# Patient Record
Sex: FEMALE | Race: WHITE | URBAN - METROPOLITAN AREA
[De-identification: names, ages, dates, MRNs, and addresses within clinical notes are randomized per-mention and may not be internally consistent; named-entity substitution may affect disease eponyms.]

---

## 2023-03-10 DIAGNOSIS — I10 PRIMARY HYPERTENSION: Primary | ICD-10-CM

## 2023-03-10 RX ORDER — HYDROCHLOROTHIAZIDE 25 MG/1
1 TABLET ORAL DAILY
COMMUNITY
Start: 2013-04-25 | End: 2023-09-07 | Stop reason: SDUPTHER

## 2023-03-10 RX ORDER — AMLODIPINE BESYLATE 10 MG/1
10 TABLET ORAL DAILY
Qty: 90 TABLET | Refills: 3 | Status: SHIPPED | OUTPATIENT
Start: 2023-03-10 | End: 2024-03-18 | Stop reason: SDUPTHER

## 2023-03-10 RX ORDER — DIAZEPAM 2 MG/1
2 TABLET ORAL EVERY 8 HOURS PRN
COMMUNITY
End: 2023-04-27 | Stop reason: SDUPTHER

## 2023-03-10 RX ORDER — ERGOCALCIFEROL 1.25 MG/1
1.25 CAPSULE ORAL
COMMUNITY
Start: 2023-01-27

## 2023-03-10 RX ORDER — AMLODIPINE BESYLATE 10 MG/1
10 TABLET ORAL DAILY
COMMUNITY
End: 2023-03-10 | Stop reason: SDUPTHER

## 2023-04-26 PROBLEM — J32.9 SINUSITIS: Status: RESOLVED | Noted: 2023-04-26 | Resolved: 2023-04-26

## 2023-04-26 PROBLEM — I10 HYPERTENSION: Status: ACTIVE | Noted: 2023-04-26

## 2023-04-26 PROBLEM — H65.90 SEROUS OTITIS MEDIA: Status: RESOLVED | Noted: 2023-04-26 | Resolved: 2023-04-26

## 2023-04-26 PROBLEM — E66.9 OBESITY: Status: ACTIVE | Noted: 2023-04-26

## 2023-04-26 PROBLEM — I51.7 MILD LEFT VENTRICULAR HYPERTROPHY: Status: ACTIVE | Noted: 2023-04-26

## 2023-04-26 PROBLEM — E55.9 VITAMIN D DEFICIENCY: Status: ACTIVE | Noted: 2023-04-26

## 2023-04-26 PROBLEM — F41.9 ANXIETY: Status: ACTIVE | Noted: 2023-04-26

## 2023-04-26 PROBLEM — M17.11 PRIMARY OSTEOARTHRITIS OF RIGHT KNEE: Status: ACTIVE | Noted: 2023-04-26

## 2023-04-26 PROBLEM — R82.90 ABNORMAL FINDING IN URINE: Status: ACTIVE | Noted: 2023-04-26

## 2023-04-26 PROBLEM — R79.9 ABNORMAL BLOOD CHEMISTRY: Status: ACTIVE | Noted: 2023-04-26

## 2023-04-26 PROBLEM — D17.21 LIPOMA OF RIGHT UPPER EXTREMITY: Status: ACTIVE | Noted: 2023-04-26

## 2023-04-26 PROBLEM — I87.2 VENOUS INSUFFICIENCY: Status: ACTIVE | Noted: 2023-04-26

## 2023-04-26 PROBLEM — L30.9 DERMATITIS: Status: RESOLVED | Noted: 2023-04-26 | Resolved: 2023-04-26

## 2023-04-26 PROBLEM — E78.89 ELEVATED HDL: Status: ACTIVE | Noted: 2023-04-26

## 2023-04-26 PROBLEM — R39.9 UTI SYMPTOMS: Status: RESOLVED | Noted: 2023-04-26 | Resolved: 2023-04-26

## 2023-04-26 PROBLEM — E78.5 HYPERLIPIDEMIA: Status: ACTIVE | Noted: 2023-04-26

## 2023-04-26 PROBLEM — J98.01 BRONCHOSPASM: Status: ACTIVE | Noted: 2023-04-26

## 2023-04-26 PROBLEM — J20.9 ACUTE BRONCHITIS: Status: RESOLVED | Noted: 2023-04-26 | Resolved: 2023-04-26

## 2023-04-27 ENCOUNTER — OFFICE VISIT (OUTPATIENT)
Dept: PRIMARY CARE | Facility: CLINIC | Age: 73
End: 2023-04-27
Payer: MEDICARE

## 2023-04-27 VITALS — SYSTOLIC BLOOD PRESSURE: 130 MMHG | DIASTOLIC BLOOD PRESSURE: 84 MMHG | HEART RATE: 78 BPM | RESPIRATION RATE: 16 BRPM

## 2023-04-27 DIAGNOSIS — R35.0 URINARY FREQUENCY: ICD-10-CM

## 2023-04-27 DIAGNOSIS — F41.9 ANXIETY: Primary | ICD-10-CM

## 2023-04-27 PROCEDURE — 99214 OFFICE O/P EST MOD 30 MIN: CPT | Performed by: INTERNAL MEDICINE

## 2023-04-27 PROCEDURE — 1160F RVW MEDS BY RX/DR IN RCRD: CPT | Performed by: INTERNAL MEDICINE

## 2023-04-27 PROCEDURE — 1036F TOBACCO NON-USER: CPT | Performed by: INTERNAL MEDICINE

## 2023-04-27 PROCEDURE — 87086 URINE CULTURE/COLONY COUNT: CPT

## 2023-04-27 PROCEDURE — 1159F MED LIST DOCD IN RCRD: CPT | Performed by: INTERNAL MEDICINE

## 2023-04-27 PROCEDURE — 3079F DIAST BP 80-89 MM HG: CPT | Performed by: INTERNAL MEDICINE

## 2023-04-27 PROCEDURE — 81001 URINALYSIS AUTO W/SCOPE: CPT

## 2023-04-27 PROCEDURE — 3075F SYST BP GE 130 - 139MM HG: CPT | Performed by: INTERNAL MEDICINE

## 2023-04-27 RX ORDER — DIAZEPAM 2 MG/1
2 TABLET ORAL EVERY 8 HOURS PRN
Qty: 90 TABLET | Refills: 1 | Status: SHIPPED | OUTPATIENT
Start: 2023-04-27 | End: 2023-10-10 | Stop reason: SDUPTHER

## 2023-04-27 NOTE — PATIENT INSTRUCTIONS
Vitamin D levels improved, may use over-the-counter 2000 international units daily to maintain  Cholesterol levels are improved with healthy level of HDL cholesterol  Blood pressure satisfactory  Anxiety therapy satisfactory, continue  Pelvic exercises and check urinalysis

## 2023-04-27 NOTE — PROGRESS NOTES
Subjective   Patient ID: Mitra Jeffries is a 73 y.o. female who presents for Blood Pressure Check.    HPI   Refill meds  No cardiovascular pulmonary or neurologic symptoms  Anxiety well controlled on current therapy  Knee replacement - grandson in Aug   Son divorce   Vitamin D   Frequency urination, requests urinalysis    Review of Systems   Constitutional: Negative.  Negative for fatigue and unexpected weight change.   Respiratory: Negative.     Cardiovascular: Negative.    Genitourinary:  Positive for frequency.   Musculoskeletal:  Positive for arthralgias.   Psychiatric/Behavioral:  Negative for agitation and dysphoric mood. The patient is nervous/anxious.        Objective   /84   Pulse 78   Resp 16     Physical Exam  Constitutional:       General: She is not in acute distress.  HENT:      Mouth/Throat:      Pharynx: Oropharynx is clear.   Eyes:      Conjunctiva/sclera: Conjunctivae normal.   Neck:      Vascular: No carotid bruit.   Cardiovascular:      Rate and Rhythm: Normal rate and regular rhythm.      Heart sounds: Normal heart sounds. No murmur heard.  Pulmonary:      Effort: Pulmonary effort is normal.      Breath sounds: Normal breath sounds. No wheezing, rhonchi or rales.   Musculoskeletal:      Right lower leg: No edema.      Left lower leg: No edema.   Neurological:      Mental Status: She is alert. Mental status is at baseline.   Psychiatric:         Mood and Affect: Mood normal.         Behavior: Behavior normal.         Thought Content: Thought content normal.     Data  Vitamin D equals 39 November 2022, increased from 14  CBC, renal panel, thyroid function satisfactory  Urine tox screen appropriate  Assessment/Plan     1 hypertension-doing well  2 chronic anxiety disorder-doing well on chronic therapy, benzodiazepine therapy has been safe effective and well-tolerated  3 degenerative joint disease knees-stable  4 vitamin D deficiency corrected  5 urinary frequency-reviewed differential  diagnosis, agree with urinalysis

## 2023-04-28 LAB
APPEARANCE, URINE: ABNORMAL
BACTERIA, URINE: ABNORMAL /HPF
BILIRUBIN, URINE: NEGATIVE
BLOOD, URINE: NEGATIVE
CALCIUM OXALATE CRYSTALS, URINE: ABNORMAL /HPF
COLOR, URINE: YELLOW
GLUCOSE, URINE: NEGATIVE MG/DL
KETONES, URINE: ABNORMAL MG/DL
LEUKOCYTE ESTERASE, URINE: ABNORMAL
MUCUS, URINE: ABNORMAL /LPF
NITRITE, URINE: NEGATIVE
PH, URINE: 5 (ref 5–8)
PROTEIN, URINE: NEGATIVE MG/DL
RBC, URINE: 2 /HPF (ref 0–5)
SPECIFIC GRAVITY, URINE: 1.02 (ref 1–1.03)
SQUAMOUS EPITHELIAL CELLS, URINE: 6 /HPF
URINE CULTURE: NORMAL
UROBILINOGEN, URINE: <2 MG/DL (ref 0–1.9)
WBC, URINE: 19 /HPF (ref 0–5)

## 2023-07-08 ASSESSMENT — ENCOUNTER SYMPTOMS
CARDIOVASCULAR NEGATIVE: 1
NERVOUS/ANXIOUS: 1
RESPIRATORY NEGATIVE: 1
FATIGUE: 0
FREQUENCY: 1
ARTHRALGIAS: 1
UNEXPECTED WEIGHT CHANGE: 0
DYSPHORIC MOOD: 0
AGITATION: 0
CONSTITUTIONAL NEGATIVE: 1

## 2023-09-07 DIAGNOSIS — I10 HYPERTENSION, UNSPECIFIED TYPE: ICD-10-CM

## 2023-09-07 RX ORDER — HYDROCHLOROTHIAZIDE 25 MG/1
25 TABLET ORAL DAILY
Qty: 90 TABLET | Refills: 3 | Status: SHIPPED | OUTPATIENT
Start: 2023-09-07

## 2023-10-05 ENCOUNTER — APPOINTMENT (OUTPATIENT)
Dept: PRIMARY CARE | Facility: CLINIC | Age: 73
End: 2023-10-05
Payer: MEDICARE

## 2023-10-10 ENCOUNTER — OFFICE VISIT (OUTPATIENT)
Dept: PRIMARY CARE | Facility: CLINIC | Age: 73
End: 2023-10-10
Payer: MEDICARE

## 2023-10-10 VITALS — HEART RATE: 76 BPM | DIASTOLIC BLOOD PRESSURE: 84 MMHG | SYSTOLIC BLOOD PRESSURE: 136 MMHG

## 2023-10-10 DIAGNOSIS — Z51.81 THERAPEUTIC DRUG MONITORING: ICD-10-CM

## 2023-10-10 DIAGNOSIS — F41.9 ANXIETY: Primary | ICD-10-CM

## 2023-10-10 DIAGNOSIS — M17.11 PRIMARY OSTEOARTHRITIS OF RIGHT KNEE: ICD-10-CM

## 2023-10-10 DIAGNOSIS — I10 PRIMARY HYPERTENSION: ICD-10-CM

## 2023-10-10 PROCEDURE — 1160F RVW MEDS BY RX/DR IN RCRD: CPT | Performed by: INTERNAL MEDICINE

## 2023-10-10 PROCEDURE — 1036F TOBACCO NON-USER: CPT | Performed by: INTERNAL MEDICINE

## 2023-10-10 PROCEDURE — 3079F DIAST BP 80-89 MM HG: CPT | Performed by: INTERNAL MEDICINE

## 2023-10-10 PROCEDURE — 1159F MED LIST DOCD IN RCRD: CPT | Performed by: INTERNAL MEDICINE

## 2023-10-10 PROCEDURE — 3075F SYST BP GE 130 - 139MM HG: CPT | Performed by: INTERNAL MEDICINE

## 2023-10-10 PROCEDURE — 99214 OFFICE O/P EST MOD 30 MIN: CPT | Performed by: INTERNAL MEDICINE

## 2023-10-10 PROCEDURE — 80346 BENZODIAZEPINES1-12: CPT

## 2023-10-10 PROCEDURE — 80307 DRUG TEST PRSMV CHEM ANLYZR: CPT

## 2023-10-10 RX ORDER — DIAZEPAM 2 MG/1
2 TABLET ORAL EVERY 8 HOURS PRN
Qty: 90 TABLET | Refills: 1 | Status: SHIPPED | OUTPATIENT
Start: 2023-10-10 | End: 2024-03-18 | Stop reason: SDUPTHER

## 2023-10-10 NOTE — PROGRESS NOTES
Subjective   Patient ID: Mitra Jeffries is a 73 y.o. female who presents for Follow-up.  HPI  Check blood pressure  No cardiovascular or pulmonary or neurologic symptoms  Medication renewal, anxiety therapy meeting her needs, well-tolerated  Stressful events Son divorce/ marriage;  dental emergency  knee gel inj plan    Review of Systems   Constitutional: Negative.    Respiratory:  Negative for cough, chest tightness and shortness of breath.    Cardiovascular: Negative.  Negative for chest pain, palpitations and leg swelling.   Musculoskeletal:  Positive for arthralgias.   Psychiatric/Behavioral:  Negative for dysphoric mood. The patient is nervous/anxious.        Objective   Physical Exam  Constitutional:       General: She is not in acute distress.     Appearance: She is obese.   Cardiovascular:      Rate and Rhythm: Normal rate and regular rhythm.      Heart sounds: Normal heart sounds. No murmur heard.  Pulmonary:      Effort: Pulmonary effort is normal.      Breath sounds: Normal breath sounds.   Musculoskeletal:      Comments: Right knee surgical scar, left knee crepitus   Neurological:      Mental Status: She is alert.      Gait: Gait normal.   Psychiatric:         Mood and Affect: Mood normal.       /84   Pulse 76       Assessment/Plan     Hypertension-satisfactory on current therapy  Chronic anxiety-satisfactory chronic use, safe and effective well-tolerated  Acknowledge stressful life events, attentive listening  Urine tox screen [facility] report and controlled substance patient agreement  Degenerative joint disease knees-managing  Declines influenza vaccine    Problem List Items Addressed This Visit       Anxiety - Primary    Relevant Medications    diazePAM (Valium) 2 mg tablet    Other Relevant Orders    Drug Screen, Urine With Reflex to Confirmation (Completed)    Benzodiazepine Confirmation, Urine (Completed)    OOB Internal Tracking (Completed)    Hypertension    Primary osteoarthritis of  right knee    Therapeutic drug monitoring    Relevant Orders    Drug Screen, Urine With Reflex to Confirmation (Completed)    Benzodiazepine Confirmation, Urine (Completed)    OOB Internal Tracking (Completed)

## 2023-10-11 LAB
AMPHETAMINES UR QL SCN: ABNORMAL
BARBITURATES UR QL SCN: ABNORMAL
BENZODIAZ UR QL SCN: ABNORMAL
BZE UR QL SCN: ABNORMAL
CANNABINOIDS UR QL SCN: ABNORMAL
FENTANYL+NORFENTANYL UR QL SCN: ABNORMAL
OPIATES UR QL SCN: ABNORMAL
OXYCODONE+OXYMORPHONE UR QL SCN: ABNORMAL
PCP UR QL SCN: ABNORMAL

## 2023-10-12 LAB
1OH-MIDAZOLAM UR CFM-MCNC: <25 NG/ML
7AMINOCLONAZEPAM UR CFM-MCNC: <25 NG/ML
A-OH ALPRAZ UR CFM-MCNC: <25 NG/ML
ALPRAZ UR CFM-MCNC: <25 NG/ML
CHLORDIAZEP UR CFM-MCNC: <25 NG/ML
CLONAZEPAM UR CFM-MCNC: <25 NG/ML
DIAZEPAM UR CFM-MCNC: <25 NG/ML
LORAZEPAM UR CFM-MCNC: <25 NG/ML
MIDAZOLAM UR CFM-MCNC: <25 NG/ML
NORDIAZEPAM UR CFM-MCNC: 311 NG/ML
OXAZEPAM UR CFM-MCNC: 356 NG/ML
TEMAZEPAM UR CFM-MCNC: 455 NG/ML

## 2023-10-29 ASSESSMENT — ENCOUNTER SYMPTOMS
PALPITATIONS: 0
COUGH: 0
DYSPHORIC MOOD: 0
CONSTITUTIONAL NEGATIVE: 1
SHORTNESS OF BREATH: 0
CARDIOVASCULAR NEGATIVE: 1
CHEST TIGHTNESS: 0
ARTHRALGIAS: 1
NERVOUS/ANXIOUS: 1

## 2024-03-15 ENCOUNTER — LAB (OUTPATIENT)
Dept: LAB | Facility: LAB | Age: 74
End: 2024-03-15
Payer: MEDICARE

## 2024-03-15 ENCOUNTER — OFFICE VISIT (OUTPATIENT)
Dept: PRIMARY CARE | Facility: CLINIC | Age: 74
End: 2024-03-15
Payer: MEDICARE

## 2024-03-15 VITALS
DIASTOLIC BLOOD PRESSURE: 84 MMHG | BODY MASS INDEX: 33.66 KG/M2 | SYSTOLIC BLOOD PRESSURE: 136 MMHG | RESPIRATION RATE: 16 BRPM | WEIGHT: 190 LBS | HEART RATE: 68 BPM

## 2024-03-15 DIAGNOSIS — I10 PRIMARY HYPERTENSION: Primary | ICD-10-CM

## 2024-03-15 DIAGNOSIS — Z00.00 ROUTINE GENERAL MEDICAL EXAMINATION AT A HEALTH CARE FACILITY: ICD-10-CM

## 2024-03-15 DIAGNOSIS — E78.5 HYPERLIPIDEMIA, UNSPECIFIED HYPERLIPIDEMIA TYPE: ICD-10-CM

## 2024-03-15 DIAGNOSIS — R73.9 HYPERGLYCEMIA, UNSPECIFIED: ICD-10-CM

## 2024-03-15 DIAGNOSIS — F41.9 ANXIETY: ICD-10-CM

## 2024-03-15 DIAGNOSIS — E78.89 ELEVATED HDL: ICD-10-CM

## 2024-03-15 DIAGNOSIS — I51.7 MILD LEFT VENTRICULAR HYPERTROPHY: ICD-10-CM

## 2024-03-15 DIAGNOSIS — Z28.21 IMMUNIZATION DECLINED: ICD-10-CM

## 2024-03-15 DIAGNOSIS — I10 PRIMARY HYPERTENSION: ICD-10-CM

## 2024-03-15 LAB
ALBUMIN SERPL BCP-MCNC: 4.4 G/DL (ref 3.4–5)
ALT SERPL W P-5'-P-CCNC: 17 U/L (ref 7–45)
ANION GAP SERPL CALC-SCNC: 14 MMOL/L (ref 10–20)
AST SERPL W P-5'-P-CCNC: 14 U/L (ref 9–39)
BUN SERPL-MCNC: 18 MG/DL (ref 6–23)
CALCIUM SERPL-MCNC: 9.8 MG/DL (ref 8.6–10.6)
CHLORIDE SERPL-SCNC: 102 MMOL/L (ref 98–107)
CHOLEST SERPL-MCNC: 278 MG/DL (ref 0–199)
CO2 SERPL-SCNC: 28 MMOL/L (ref 21–32)
CREAT SERPL-MCNC: 0.54 MG/DL (ref 0.5–1.05)
EGFRCR SERPLBLD CKD-EPI 2021: >90 ML/MIN/1.73M*2
EST. AVERAGE GLUCOSE BLD GHB EST-MCNC: 111 MG/DL
GLUCOSE SERPL-MCNC: 102 MG/DL (ref 74–99)
HBA1C MFR BLD: 5.5 %
HCV AB SER QL: NONREACTIVE
HDLC SERPL-MCNC: 48.8 MG/DL
PHOSPHATE SERPL-MCNC: 3.6 MG/DL (ref 2.5–4.9)
POTASSIUM SERPL-SCNC: 3.8 MMOL/L (ref 3.5–5.3)
SODIUM SERPL-SCNC: 140 MMOL/L (ref 136–145)
TSH SERPL-ACNC: 1.92 MIU/L (ref 0.44–3.98)

## 2024-03-15 PROCEDURE — 99214 OFFICE O/P EST MOD 30 MIN: CPT | Performed by: INTERNAL MEDICINE

## 2024-03-15 PROCEDURE — 3075F SYST BP GE 130 - 139MM HG: CPT | Performed by: INTERNAL MEDICINE

## 2024-03-15 PROCEDURE — 84460 ALANINE AMINO (ALT) (SGPT): CPT

## 2024-03-15 PROCEDURE — 1036F TOBACCO NON-USER: CPT | Performed by: INTERNAL MEDICINE

## 2024-03-15 PROCEDURE — 84450 TRANSFERASE (AST) (SGOT): CPT

## 2024-03-15 PROCEDURE — 80069 RENAL FUNCTION PANEL: CPT

## 2024-03-15 PROCEDURE — 83036 HEMOGLOBIN GLYCOSYLATED A1C: CPT

## 2024-03-15 PROCEDURE — 82465 ASSAY BLD/SERUM CHOLESTEROL: CPT

## 2024-03-15 PROCEDURE — 3079F DIAST BP 80-89 MM HG: CPT | Performed by: INTERNAL MEDICINE

## 2024-03-15 PROCEDURE — 83718 ASSAY OF LIPOPROTEIN: CPT

## 2024-03-15 PROCEDURE — 84443 ASSAY THYROID STIM HORMONE: CPT

## 2024-03-15 PROCEDURE — 86803 HEPATITIS C AB TEST: CPT

## 2024-03-15 PROCEDURE — 36415 COLL VENOUS BLD VENIPUNCTURE: CPT

## 2024-03-15 NOTE — PROGRESS NOTES
Subjective   Patient ID: Mitra Jeffries is a 73 y.o. female who presents for Follow-up.  HPI  Overall feels well with no acute complaints  Check blood pressure  No cardiovascular pulmonary or neurologic symptoms  Anxiety disorder well-controlled on current medical therapy, well-tolerated  Requests renewal disability parking  Requests lab evaluation, cholesterol  Declines immunizations    Review of Systems   Constitutional: Negative.    Respiratory:  Negative for cough, chest tightness and shortness of breath.    Cardiovascular: Negative.  Negative for chest pain, palpitations and leg swelling.   Musculoskeletal:  Positive for arthralgias.        Knees status post TKA x 1   Psychiatric/Behavioral:  Negative for dysphoric mood. The patient is not nervous/anxious.        Objective   Physical Exam  Constitutional:       General: She is not in acute distress.     Appearance: She is obese.   Cardiovascular:      Rate and Rhythm: Normal rate and regular rhythm.      Heart sounds: Normal heart sounds. No murmur heard.  Pulmonary:      Effort: Pulmonary effort is normal.      Breath sounds: Normal breath sounds.   Musculoskeletal:      Comments: Right knee surgical scar, left knee crepitus   Neurological:      Mental Status: She is alert.      Gait: Gait normal.   Psychiatric:         Attention and Perception: Attention normal.         Mood and Affect: Mood normal.         Speech: Speech normal.         Behavior: Behavior is not agitated or slowed.       /84   Pulse 68   Resp 16   Wt 86.2 kg (190 lb)   BMI 33.66 kg/m²     Data  Orthopedic consult 11/6/2023 reviewed-left knee therapeutic viscosity injection  X-ray left knee 11/6/2023          Lipid panel 11/1/2022  Total cholesterol 303, HDL cholesterol 67, , triglycerides 163  Renal function normal    Assessment/Plan     Problem List Items Addressed This Visit       Anxiety    Hyperlipidemia    Hypertension - Primary    Relevant Orders    Renal Function  Panel (Completed)    Hemoglobin A1C (Completed)    TSH with reflex to Free T4 if abnormal (Completed)    Alanine Aminotransferase (Completed)    Aspartate Aminotransferase (Completed)    Hepatitis C Antibody (Completed)    Cholesterol, total (Completed)    HDL cholesterol (Completed)    Mild left ventricular hypertrophy    Hyperglycemia, unspecified    Relevant Orders    Hemoglobin A1C (Completed)    Cholesterol, total (Completed)    HDL cholesterol (Completed)    Immunization declined    Routine general medical examination at a health care facility    Relevant Orders    Hepatitis C Antibody (Completed)

## 2024-03-18 DIAGNOSIS — I10 PRIMARY HYPERTENSION: ICD-10-CM

## 2024-03-18 DIAGNOSIS — F41.9 ANXIETY: ICD-10-CM

## 2024-03-19 RX ORDER — AMLODIPINE BESYLATE 10 MG/1
10 TABLET ORAL DAILY
Qty: 90 TABLET | Refills: 3 | Status: SHIPPED | OUTPATIENT
Start: 2024-03-19

## 2024-03-19 RX ORDER — DIAZEPAM 2 MG/1
2 TABLET ORAL EVERY 8 HOURS PRN
Qty: 90 TABLET | Refills: 1 | Status: SHIPPED | OUTPATIENT
Start: 2024-03-19 | End: 2024-06-17

## 2024-03-24 PROBLEM — R73.9 HYPERGLYCEMIA, UNSPECIFIED: Status: ACTIVE | Noted: 2024-03-24

## 2024-03-24 PROBLEM — Z28.21 IMMUNIZATION DECLINED: Status: ACTIVE | Noted: 2024-03-24

## 2024-03-24 PROBLEM — Z00.00 ROUTINE GENERAL MEDICAL EXAMINATION AT A HEALTH CARE FACILITY: Status: ACTIVE | Noted: 2024-03-24

## 2024-03-24 ASSESSMENT — ENCOUNTER SYMPTOMS
SHORTNESS OF BREATH: 0
COUGH: 0
CHEST TIGHTNESS: 0
ARTHRALGIAS: 1
PALPITATIONS: 0
NERVOUS/ANXIOUS: 0
CONSTITUTIONAL NEGATIVE: 1
DYSPHORIC MOOD: 0
CARDIOVASCULAR NEGATIVE: 1

## 2024-04-19 ENCOUNTER — TELEPHONE (OUTPATIENT)
Dept: PRIMARY CARE | Facility: CLINIC | Age: 74
End: 2024-04-19
Payer: MEDICARE

## 2024-04-22 DIAGNOSIS — M17.11 PRIMARY OSTEOARTHRITIS OF RIGHT KNEE: Primary | ICD-10-CM

## 2024-07-12 ENCOUNTER — APPOINTMENT (OUTPATIENT)
Dept: PRIMARY CARE | Facility: CLINIC | Age: 74
End: 2024-07-12
Payer: MEDICARE

## 2024-07-12 DIAGNOSIS — M17.11 PRIMARY OSTEOARTHRITIS OF RIGHT KNEE: ICD-10-CM

## 2024-07-12 DIAGNOSIS — I10 PRIMARY HYPERTENSION: Primary | ICD-10-CM

## 2024-07-12 DIAGNOSIS — E78.5 HYPERLIPIDEMIA, UNSPECIFIED HYPERLIPIDEMIA TYPE: ICD-10-CM

## 2024-07-12 DIAGNOSIS — F41.9 ANXIETY: ICD-10-CM

## 2024-07-12 DIAGNOSIS — M17.12 PRIMARY OSTEOARTHRITIS OF LEFT KNEE: ICD-10-CM

## 2024-07-12 PROCEDURE — 3079F DIAST BP 80-89 MM HG: CPT | Performed by: INTERNAL MEDICINE

## 2024-07-12 PROCEDURE — 3075F SYST BP GE 130 - 139MM HG: CPT | Performed by: INTERNAL MEDICINE

## 2024-07-12 PROCEDURE — 99213 OFFICE O/P EST LOW 20 MIN: CPT | Performed by: INTERNAL MEDICINE

## 2024-07-12 RX ORDER — DIAZEPAM 2 MG/1
2 TABLET ORAL EVERY 8 HOURS PRN
Qty: 90 TABLET | Refills: 2 | Status: SHIPPED | OUTPATIENT
Start: 2024-07-12 | End: 2024-10-10

## 2024-07-12 NOTE — PROGRESS NOTES
Subjective   Patient ID: Mitra Jeffries is a 74 y.o. female who presents for Follow-up.  HPI  Feels well   Knee injection   Weight   6/17 tooth infection treated ATB   Meds diazepam managing anxiety well  Lab reviewed     Review of Systems   Constitutional: Negative.    Respiratory:  Negative for cough, chest tightness and shortness of breath.    Cardiovascular: Negative.  Negative for chest pain, palpitations and leg swelling.   Musculoskeletal:  Positive for arthralgias.        Knees status post TKA    Psychiatric/Behavioral:  Negative for dysphoric mood. The patient is not nervous/anxious.        Objective   Physical Exam  Constitutional:       General: She is not in acute distress.     Appearance: She is obese.   HENT:      Mouth/Throat:      Pharynx: Oropharynx is clear.   Cardiovascular:      Rate and Rhythm: Normal rate and regular rhythm.      Heart sounds: Normal heart sounds. No murmur heard.  Pulmonary:      Effort: Pulmonary effort is normal.      Breath sounds: Normal breath sounds.   Musculoskeletal:      Comments: Right knee surgical scar, left knee crepitus   Neurological:      Mental Status: She is alert.      Gait: Gait normal.   Psychiatric:         Attention and Perception: Attention normal.         Mood and Affect: Mood normal.         Speech: Speech normal.         Behavior: Behavior is not agitated or slowed.       /82   Pulse 72   Resp 16     Data  Lab 3/15/2024 reviewed, satisfactory other than high total cholesterol 278  CSA and drug screen October 2023    Assessment/Plan     Chronic problems stable and doing well on current therapy  Declines intervention cholesterol  Problem List Items Addressed This Visit       Anxiety    Relevant Medications    diazePAM (Valium) 2 mg tablet    Hyperlipidemia    Hypertension - Primary    Primary osteoarthritis of right knee    Primary osteoarthritis of left knee

## 2024-07-27 VITALS — RESPIRATION RATE: 16 BRPM | HEART RATE: 72 BPM | DIASTOLIC BLOOD PRESSURE: 82 MMHG | SYSTOLIC BLOOD PRESSURE: 136 MMHG

## 2024-07-27 PROBLEM — M17.12 PRIMARY OSTEOARTHRITIS OF LEFT KNEE: Status: ACTIVE | Noted: 2024-07-27

## 2024-07-27 ASSESSMENT — ENCOUNTER SYMPTOMS
CHEST TIGHTNESS: 0
NERVOUS/ANXIOUS: 0
CARDIOVASCULAR NEGATIVE: 1
PALPITATIONS: 0
DYSPHORIC MOOD: 0
CONSTITUTIONAL NEGATIVE: 1
SHORTNESS OF BREATH: 0
ARTHRALGIAS: 1
COUGH: 0

## 2024-11-19 ENCOUNTER — HOSPITAL ENCOUNTER (EMERGENCY)
Facility: HOSPITAL | Age: 74
Discharge: HOME | End: 2024-11-19
Payer: MEDICARE

## 2024-11-19 VITALS
TEMPERATURE: 98 F | WEIGHT: 188 LBS | DIASTOLIC BLOOD PRESSURE: 94 MMHG | SYSTOLIC BLOOD PRESSURE: 177 MMHG | OXYGEN SATURATION: 95 % | HEART RATE: 92 BPM | HEIGHT: 62 IN | BODY MASS INDEX: 34.6 KG/M2 | RESPIRATION RATE: 18 BRPM

## 2024-11-19 DIAGNOSIS — H66.002 LEFT ACUTE SUPPURATIVE OTITIS MEDIA: ICD-10-CM

## 2024-11-19 DIAGNOSIS — R42 VERTIGO: Primary | ICD-10-CM

## 2024-11-19 PROCEDURE — 2500000001 HC RX 250 WO HCPCS SELF ADMINISTERED DRUGS (ALT 637 FOR MEDICARE OP): Performed by: PHYSICIAN ASSISTANT

## 2024-11-19 PROCEDURE — 2500000002 HC RX 250 W HCPCS SELF ADMINISTERED DRUGS (ALT 637 FOR MEDICARE OP, ALT 636 FOR OP/ED): Performed by: PHYSICIAN ASSISTANT

## 2024-11-19 PROCEDURE — 99283 EMERGENCY DEPT VISIT LOW MDM: CPT | Performed by: PHYSICIAN ASSISTANT

## 2024-11-19 RX ORDER — MECLIZINE HYDROCHLORIDE 25 MG/1
25 TABLET ORAL ONCE
Status: COMPLETED | OUTPATIENT
Start: 2024-11-19 | End: 2024-11-19

## 2024-11-19 RX ORDER — MECLIZINE HYDROCHLORIDE 25 MG/1
25 TABLET ORAL EVERY 8 HOURS PRN
Qty: 21 TABLET | Refills: 0 | Status: SHIPPED | OUTPATIENT
Start: 2024-11-19

## 2024-11-19 RX ORDER — DOXYCYCLINE HYCLATE 100 MG
100 TABLET ORAL ONCE
Status: COMPLETED | OUTPATIENT
Start: 2024-11-19 | End: 2024-11-19

## 2024-11-19 RX ORDER — DOXYCYCLINE 100 MG/1
100 CAPSULE ORAL 2 TIMES DAILY
Qty: 14 CAPSULE | Refills: 0 | Status: SHIPPED | OUTPATIENT
Start: 2024-11-19 | End: 2024-11-26

## 2024-11-19 ASSESSMENT — PAIN - FUNCTIONAL ASSESSMENT: PAIN_FUNCTIONAL_ASSESSMENT: 0-10

## 2024-11-19 ASSESSMENT — COLUMBIA-SUICIDE SEVERITY RATING SCALE - C-SSRS
6. HAVE YOU EVER DONE ANYTHING, STARTED TO DO ANYTHING, OR PREPARED TO DO ANYTHING TO END YOUR LIFE?: NO
2. HAVE YOU ACTUALLY HAD ANY THOUGHTS OF KILLING YOURSELF?: NO
1. IN THE PAST MONTH, HAVE YOU WISHED YOU WERE DEAD OR WISHED YOU COULD GO TO SLEEP AND NOT WAKE UP?: NO

## 2024-11-19 ASSESSMENT — PAIN DESCRIPTION - ONSET: ONSET: AWAKENED FROM SLEEP

## 2024-11-19 ASSESSMENT — PAIN SCALES - GENERAL
PAINLEVEL_OUTOF10: 0 - NO PAIN
PAINLEVEL_OUTOF10: 0 - NO PAIN

## 2024-11-19 ASSESSMENT — PAIN DESCRIPTION - FREQUENCY: FREQUENCY: CONSTANT/CONTINUOUS

## 2024-11-19 ASSESSMENT — PAIN DESCRIPTION - PROGRESSION: CLINICAL_PROGRESSION: NOT CHANGED

## 2024-11-19 NOTE — ED PROVIDER NOTES
HPI   Chief Complaint   Patient presents with    Dizziness       History of present illness: 74-year-old female complains of dizziness for over 3 days.  Patient states that she has a sensation that the room is spinning With positional change.  Patient states that she noticed that when she woke up and got out of the bed it was a little worse.  Patient was nauseous and gagging when it occurred.  Patient while laying in bed right now has no dizziness.  Patient reports congestion and a fullness in her left ear.  Patient also felt a bump in her postauricular area with some discomfort.  Denies diplopia, blurred vision, headache, neck pain, neck stiffness, fevers, chills, sweats, paresthesias, incontinence, weakness, inability to ambulate.  Patient has severe left knee osteoarthritis with plans of possible surgery and has baseline difficulty ambulating due to the pain.  Patient does not report difficulty ambulating secondary to the dizziness.  Patient has not attempted any interventions.    Review of systems: Constitutional, eye, ENT, cardiovascular, respiratory, gastrointestinal, genitourinary, neurologic, musculoskeletal, dermatologic, hematologic, endocrine systems were evaluated and were negative unless otherwise specified in history of present illness.    Medications: Reviewed and per nursing note.    Family history: Denies relevant medical conditions.    Social history: Denies tobacco, alcohol, drug use.      Physical exam:    Appearance: Well-developed, well-nourished, nontoxic-appearing, alert and oriented x3. Talking in complete sentences.    HEENT:  Head normocephalic atraumatic, extraocular movements intact, pupils equal round reactive to light, mucous membranes are moist and pink.  Normal fields of vision.  Normal test of skew.  Effusion in the left middle ear with bulging.  Left postauricular lymphadenopathy with no mastoid tenderness or edema.  Left beating nystagmus that is torsional and exhaustive.    NECK:   Nml Inspection, no meningismus, no thyromegaly, no lymphadenopathy, no JVD, trachea is midline.    Respiratory: Clear to auscultation bilaterally with normal bilateral excursion. No wheezes, rhonchi, crackles.    Cardiovascular: Regular rate and rhythm, no murmurs, rubs or gallops. Pulses 2+ symmetrically in the dorsalis pedis and radial pulses.    Abdomen/GI:  Soft, nontender, nondistended, normal bowel sounds x4. No masses or organomegaly.    :  No CVA tenderness    Neuro:  Oriented x 3, Speech Clear, cranial nerves grossly intact. Normal sensation to light touch in all 4 extremities.  Normal finger-nose, normal Romberg.  Antalgic gait secondary to pain without clear ataxia or imbalance.  Normal heel-to-shin.    Musculoskeletal: Patient spontaneously moves all 4 extremities.    Skin:  No cyanosis, clubbing, edema, open wounds, or rashes.            Patient History   Past Medical History:   Diagnosis Date    Dermatitis 2023    Diverticulosis of intestine, part unspecified, without perforation or abscess without bleeding 03/10/2014    Diverticulosis    Personal history of (healed) traumatic fracture 03/10/2014    History of fracture of toe    Personal history of other diseases of the nervous system and sense organs 03/10/2014    History of migraine    Personal history of other venous thrombosis and embolism 2019    History of deep vein thrombosis (DVT) of lower extremity    Personal history of pulmonary embolism 2020    History of pulmonary embolism    Personal history of urinary (tract) infections 03/10/2014    History of cystitis    Serous otitis media 2023    Sinusitis 2023     Past Surgical History:   Procedure Laterality Date    APPENDECTOMY  03/10/2014    Appendectomy     SECTION, CLASSIC  2013     Section    CHOLECYSTECTOMY  2013    Cholecystectomy    OTHER SURGICAL HISTORY  2016    Closed Treatment Of Fractured Toe    OTHER SURGICAL HISTORY   09/07/2016    Arthrocentesis Injection Of Knee Joint    TONSILLECTOMY  03/10/2014    Tonsillectomy With Adenoidectomy    TOTAL KNEE ARTHROPLASTY  01/13/2018    Total Knee Replacement Right     No family history on file.  Social History     Tobacco Use    Smoking status: Former     Types: Cigarettes     Passive exposure: Never    Smokeless tobacco: Never   Substance Use Topics    Alcohol use: Not on file    Drug use: Never       Physical Exam   ED Triage Vitals [11/19/24 0748]   Temperature Heart Rate Respirations BP   36.7 °C (98 °F) 92 18 (!) 177/94      Pulse Ox Temp Source Heart Rate Source Patient Position   95 % Temporal Monitor Sitting      BP Location FiO2 (%)     Left arm --       Physical Exam      ED Course & MDM   ED Course as of 11/19/24 0824 Tue Nov 19, 2024   0814 EKG: Sinus rhythm at rate of 89 beats per minutes with left bundle branch block and no convex ST elevation.  DE interval 174 with a QTc 530.  EKG has some changes since last EKG available in 1993.  This is interpreted by myself. [SK]      ED Course User Index  [SK] Moshe Finley PA-C         Diagnoses as of 11/19/24 0824   Vertigo   Left acute suppurative otitis media                 No data recorded     Talmo Coma Scale Score: 15 (11/19/24 0750 : Mdaalyn Saucedo RN)                           Medical Decision Making  Patient complains of dizziness.  Differential diagnosis of vertigo, lightheadedness, benign paroxysmal positional vertigo, labyrinthitis, Ménière disease, stroke, COVID-19, influenza, pneumonia, otitis media, tonsillitis, meningitis, sinusitis.  Examination shows lungs clear to auscultation, no meningeal signs, no sinus tenderness making pneumonia, meningitis, sinusitis unlikely.  Patient has congestion pain in the left ear and postauricular region with bulging of the tympanic membrane and left beating nystagmus that is slight and exhaustive.  Patient has positional vertigo with no lightheadedness sensation.  Patient's  presentation is most likely acute otitis media serous versus suppurative with local postauricular lymphadenopathy and a vertigo.  Vertigo seems to be more positional vertigo than a labyrinthitis.  Patient is afebrile nontoxic-appearing.  Patient has NIH score is 0 with normal test of skew.  Unlikely stroke.  Patient was offered CT scan for full evaluation, utilizing shared decision making she declined at this time.  Patient was given Antivert and first dose of antibiotic in the hospital.  This provides some relief.    Patient will be discharged to home with prescription for Antivert for vertigo and doxycycline for otitis media.  Patient is educated in signs and symptoms of worsening symptoms and reasons to come back to the emergency department.  Will need to follow up with primary care provider.  Patient does not report social determinants of health impacting ability to obtain care that is needed.  Patient agrees with plan.    This is a transcription.  Text was reviewed for errors, but some transcription errors may remain.  Please call for any questions.          Procedure  Procedures     Moshe Finley PA-C  11/19/24 0874

## 2024-11-19 NOTE — ED TRIAGE NOTES
Patient presents to ED from home for dizziness that started 5 days ago. Patient states that when she turns a certain way or bends over the dizziness worsens. She also states there is pressure behind her left. Patient denies any chest pain or SOB. She states it feels like the room is spinning.

## 2024-11-20 DIAGNOSIS — I10 HYPERTENSION, UNSPECIFIED TYPE: ICD-10-CM

## 2024-11-20 RX ORDER — HYDROCHLOROTHIAZIDE 25 MG/1
25 TABLET ORAL DAILY
Qty: 90 TABLET | Refills: 3 | Status: SHIPPED | OUTPATIENT
Start: 2024-11-20

## 2024-11-20 NOTE — TELEPHONE ENCOUNTER
Pt called and stated that pt need a refill on hydrochlorothiazide. Pt has an appt. On 12/19/2024.

## 2024-12-13 ENCOUNTER — APPOINTMENT (OUTPATIENT)
Dept: ORTHOPEDIC SURGERY | Facility: CLINIC | Age: 74
End: 2024-12-13
Payer: MEDICARE

## 2024-12-13 DIAGNOSIS — G89.29 CHRONIC PAIN OF LEFT KNEE: Primary | ICD-10-CM

## 2024-12-13 DIAGNOSIS — M25.562 CHRONIC PAIN OF LEFT KNEE: Primary | ICD-10-CM

## 2024-12-19 ENCOUNTER — APPOINTMENT (OUTPATIENT)
Dept: PRIMARY CARE | Facility: CLINIC | Age: 74
End: 2024-12-19
Payer: MEDICARE

## 2024-12-19 ENCOUNTER — LAB (OUTPATIENT)
Dept: LAB | Facility: LAB | Age: 74
End: 2024-12-19
Payer: MEDICARE

## 2024-12-19 VITALS
BODY MASS INDEX: 34.39 KG/M2 | SYSTOLIC BLOOD PRESSURE: 166 MMHG | RESPIRATION RATE: 20 BRPM | WEIGHT: 188 LBS | TEMPERATURE: 97.5 F | OXYGEN SATURATION: 96 % | HEART RATE: 77 BPM | DIASTOLIC BLOOD PRESSURE: 82 MMHG

## 2024-12-19 DIAGNOSIS — M17.12 PRIMARY OSTEOARTHRITIS OF LEFT KNEE: ICD-10-CM

## 2024-12-19 DIAGNOSIS — F41.9 ANXIETY: Primary | ICD-10-CM

## 2024-12-19 DIAGNOSIS — Z51.81 ENCOUNTER FOR THERAPEUTIC DRUG LEVEL MONITORING: ICD-10-CM

## 2024-12-19 DIAGNOSIS — F41.9 ANXIETY: ICD-10-CM

## 2024-12-19 LAB
AMPHETAMINES UR QL SCN: ABNORMAL
BARBITURATES UR QL SCN: ABNORMAL
BENZODIAZ UR QL SCN: ABNORMAL
BZE UR QL SCN: ABNORMAL
CANNABINOIDS UR QL SCN: ABNORMAL
FENTANYL+NORFENTANYL UR QL SCN: ABNORMAL
METHADONE UR QL SCN: ABNORMAL
OPIATES UR QL SCN: ABNORMAL
OXYCODONE+OXYMORPHONE UR QL SCN: ABNORMAL
PCP UR QL SCN: ABNORMAL

## 2024-12-19 PROCEDURE — 3077F SYST BP >= 140 MM HG: CPT | Performed by: INTERNAL MEDICINE

## 2024-12-19 PROCEDURE — 3079F DIAST BP 80-89 MM HG: CPT | Performed by: INTERNAL MEDICINE

## 2024-12-19 PROCEDURE — 80307 DRUG TEST PRSMV CHEM ANLYZR: CPT

## 2024-12-19 PROCEDURE — 80346 BENZODIAZEPINES1-12: CPT

## 2024-12-19 PROCEDURE — 99213 OFFICE O/P EST LOW 20 MIN: CPT | Performed by: INTERNAL MEDICINE

## 2024-12-19 PROCEDURE — 1159F MED LIST DOCD IN RCRD: CPT | Performed by: INTERNAL MEDICINE

## 2024-12-19 RX ORDER — DIAZEPAM 2 MG/1
2 TABLET ORAL EVERY 8 HOURS PRN
Qty: 90 TABLET | Refills: 2 | Status: SHIPPED | OUTPATIENT
Start: 2024-12-19 | End: 2025-03-19

## 2024-12-19 ASSESSMENT — PATIENT HEALTH QUESTIONNAIRE - PHQ9
1. LITTLE INTEREST OR PLEASURE IN DOING THINGS: NOT AT ALL
SUM OF ALL RESPONSES TO PHQ9 QUESTIONS 1 AND 2: 0
2. FEELING DOWN, DEPRESSED OR HOPELESS: NOT AT ALL

## 2024-12-20 ENCOUNTER — LAB (OUTPATIENT)
Dept: LAB | Facility: LAB | Age: 74
End: 2024-12-20
Payer: MEDICARE

## 2024-12-20 ENCOUNTER — OFFICE VISIT (OUTPATIENT)
Dept: ORTHOPEDIC SURGERY | Facility: CLINIC | Age: 74
End: 2024-12-20
Payer: MEDICARE

## 2024-12-20 ENCOUNTER — HOSPITAL ENCOUNTER (OUTPATIENT)
Dept: RADIOLOGY | Facility: CLINIC | Age: 74
Discharge: HOME | End: 2024-12-20
Payer: MEDICARE

## 2024-12-20 VITALS — WEIGHT: 191 LBS | BODY MASS INDEX: 35.15 KG/M2 | HEIGHT: 62 IN

## 2024-12-20 DIAGNOSIS — G89.29 CHRONIC PAIN OF LEFT KNEE: Primary | ICD-10-CM

## 2024-12-20 DIAGNOSIS — Z96.651 HISTORY OF TOTAL KNEE ARTHROPLASTY, RIGHT: ICD-10-CM

## 2024-12-20 DIAGNOSIS — M25.562 CHRONIC PAIN OF LEFT KNEE: ICD-10-CM

## 2024-12-20 DIAGNOSIS — R73.9 HYPERGLYCEMIA: ICD-10-CM

## 2024-12-20 DIAGNOSIS — G89.29 CHRONIC PAIN OF LEFT KNEE: ICD-10-CM

## 2024-12-20 DIAGNOSIS — M25.562 CHRONIC PAIN OF LEFT KNEE: Primary | ICD-10-CM

## 2024-12-20 LAB
ALBUMIN SERPL BCP-MCNC: 4.3 G/DL (ref 3.4–5)
EST. AVERAGE GLUCOSE BLD GHB EST-MCNC: 103 MG/DL
HBA1C MFR BLD: 5.2 %
HCT VFR BLD AUTO: 45 % (ref 36–46)
HGB BLD-MCNC: 15.1 G/DL (ref 12–16)

## 2024-12-20 PROCEDURE — G2211 COMPLEX E/M VISIT ADD ON: HCPCS | Performed by: STUDENT IN AN ORGANIZED HEALTH CARE EDUCATION/TRAINING PROGRAM

## 2024-12-20 PROCEDURE — 85014 HEMATOCRIT: CPT

## 2024-12-20 PROCEDURE — 73562 X-RAY EXAM OF KNEE 3: CPT | Mod: RT

## 2024-12-20 PROCEDURE — 85018 HEMOGLOBIN: CPT

## 2024-12-20 PROCEDURE — 82040 ASSAY OF SERUM ALBUMIN: CPT

## 2024-12-20 PROCEDURE — 36415 COLL VENOUS BLD VENIPUNCTURE: CPT

## 2024-12-20 PROCEDURE — 99215 OFFICE O/P EST HI 40 MIN: CPT | Performed by: STUDENT IN AN ORGANIZED HEALTH CARE EDUCATION/TRAINING PROGRAM

## 2024-12-20 PROCEDURE — 83036 HEMOGLOBIN GLYCOSYLATED A1C: CPT

## 2024-12-20 PROCEDURE — 73564 X-RAY EXAM KNEE 4 OR MORE: CPT | Mod: LT

## 2024-12-20 PROCEDURE — 3008F BODY MASS INDEX DOCD: CPT | Performed by: STUDENT IN AN ORGANIZED HEALTH CARE EDUCATION/TRAINING PROGRAM

## 2024-12-20 PROCEDURE — 1159F MED LIST DOCD IN RCRD: CPT | Performed by: STUDENT IN AN ORGANIZED HEALTH CARE EDUCATION/TRAINING PROGRAM

## 2024-12-20 PROCEDURE — 99205 OFFICE O/P NEW HI 60 MIN: CPT | Performed by: STUDENT IN AN ORGANIZED HEALTH CARE EDUCATION/TRAINING PROGRAM

## 2024-12-20 ASSESSMENT — PAIN - FUNCTIONAL ASSESSMENT: PAIN_FUNCTIONAL_ASSESSMENT: 0-10

## 2024-12-20 ASSESSMENT — PAIN SCALES - GENERAL: PAINLEVEL_OUTOF10: 5 - MODERATE PAIN

## 2024-12-20 ASSESSMENT — PAIN DESCRIPTION - DESCRIPTORS: DESCRIPTORS: ACHING

## 2024-12-20 NOTE — PROGRESS NOTES
DEVEN/TKA Related Summary           L hip: N  L knee: N  R hip: N  R knee  1/7/2017: Primary TKA (Dr. Dr. Rosales at [facility]).  Doing well  Lumbar surgery or significant pathology: N            CC/SUBJECTIVE/HPI            Referring provider: No ref. provider found  Mitra Jeffries is a 74 y.o. female presenting  for L knee pain.  She has had this pain for many years, and it has gotten significantly worse over time. At this point, the pain is limiting activities of daily living and hobbies.    L knee  Symptoms  Pain: 7/10  Onset: chronic/gradual  Duration: >2yrs  Location: front knee and all over  Quality: dull/ache and catch/lock  Limitations: morning stiffness, pain after activity, limp, feeling unstable, night pain, difficulty with stairs, difficulty in/out of chair/car, and difficulty with socks/shoes/clothes  Ambulation limit due to pain: <100ft  Other symptoms: none  Treatment  Tried: activity modification, bracing, PT, home exercises, OTCs, corticosteroid injection(s), and HA injection(s)  Most recent injection <3mo ago? N  Assistive device: none  Treatment attempted for >2yrs and is no longer effective            HISTORIES (System Generated and Pt Reported on Form Today)       Dental  Pt reported: No active issues     PMH  Pt reported: Denies heart/lung/kidney/liver issues, DM, stroke, seizure, bariatric surgery, anticoag, MRSA, cancer, personal/familial coagulopathies except: none in addition to below  System generated (PMH, problem list both included since EMR change caused discrepancies):   Past Medical History:   Diagnosis Date    Dermatitis 04/26/2023    Diverticulosis of intestine, part unspecified, without perforation or abscess without bleeding 03/10/2014    Diverticulosis    Personal history of (healed) traumatic fracture 03/10/2014    History of fracture of toe    Personal history of other diseases of the nervous system and sense organs 03/10/2014    History of migraine    Personal  history of other venous thrombosis and embolism 2019    History of deep vein thrombosis (DVT) of lower extremity    Personal history of pulmonary embolism 2020    History of pulmonary embolism    Personal history of urinary (tract) infections 03/10/2014    History of cystitis    Serous otitis media 2023    Sinusitis 2023      Patient Active Problem List   Diagnosis    Abnormal blood chemistry    Abnormal finding in urine    Anxiety    Bronchospasm    Elevated HDL    Hyperlipidemia    Hypertension    Lipoma of right upper extremity    Mild left ventricular hypertrophy    Obesity    Primary osteoarthritis of right knee    Venous insufficiency    Vitamin D deficiency    Therapeutic drug monitoring    Hyperglycemia, unspecified    Immunization declined    Routine general medical examination at a health care facility    Primary osteoarthritis of left knee     PSH  Pt reported: Per above.   System generated:   Past Surgical History:   Procedure Laterality Date    APPENDECTOMY  03/10/2014    Appendectomy     SECTION, CLASSIC  2013     Section    CHOLECYSTECTOMY  2013    Cholecystectomy    OTHER SURGICAL HISTORY  2016    Closed Treatment Of Fractured Toe    OTHER SURGICAL HISTORY  2016    Arthrocentesis Injection Of Knee Joint    TONSILLECTOMY  03/10/2014    Tonsillectomy With Adenoidectomy    TOTAL KNEE ARTHROPLASTY  2018    Total Knee Replacement Right     Family Hx  Pt reported clot/coagulopathies: none    Social Hx  Pt reported substance use: per below  System generated:   Social History     Tobacco Use    Smoking status: Former     Types: Cigarettes     Passive exposure: Never    Smokeless tobacco: Never   Substance Use Topics    Drug use: Never     Allergies  Pt reported (meds, metals): per below  System generated:   Allergies   Allergen Reactions    Codeine Unknown    Penicillins Unknown    Shellfish Containing Products Unknown    Sulfa  "(Sulfonamide Antibiotics) Unknown     Current Meds  System generated:   Current Outpatient Medications:     amLODIPine (Norvasc) 10 mg tablet, Take 1 tablet (10 mg) by mouth once daily., Disp: 90 tablet, Rfl: 3    diazePAM (Valium) 2 mg tablet, Take 1 tablet (2 mg) by mouth every 8 hours if needed for anxiety., Disp: 90 tablet, Rfl: 2    ergocalciferol (Vitamin D-2) 1.25 MG (23552 UT) capsule, Take 1 capsule (1,250 mcg) by mouth every 7 days., Disp: , Rfl:     hydroCHLOROthiazide (HYDRODiuril) 25 mg tablet, Take 1 tablet (25 mg) by mouth once daily., Disp: 90 tablet, Rfl: 3    meclizine (Antivert) 25 mg tablet, Take 1 tablet (25 mg) by mouth every 8 hours if needed for dizziness (may cause sleepiness) for up to 21 doses., Disp: 21 tablet, Rfl: 0    ROS: Neg except HPI            OBJECTIVE            Physical exam  Estimated body mass index is 34.39 kg/m² as calculated from the following:    Height as of 11/19/24: 1.575 m (5' 2\").    Weight as of 11/19/24: 85.3 kg (188 lb).  Gen/psych: NAD, conversational, appropriate    Ambulation  Gait: antalgic  Assistive device: none  Spine  Lumbar tenderness: neg  Limited ROM: neg, with no radiation or pain with flex/ex, lateral bending  SLR test: neg    Focused MSK exam: L  Knee  Thrust: neg  Skin/incision: normal in area of planned/possible incision (medial parapatellar approach)  Effusion: mild  Alignment: varus  Alignment correctable: partially  Knee tenderness: diffuse  Pes or Gerdy's tenderness: neg  Crepitation: moderate  Extension: passive flexion contracture 10° and active extension lag 0°  Flexion: 105°  Anterior drawer: stable  Posterior drawer: stable  Varus/Valgus in extension: stable  Varus/Valgus in flexion: stable  Referred pain to knee with hip 90° flexion and 45° ER/IR: neg  Neurovascular    Strength: 5/5 hip/knee/ankle flexion and extension  Sensory (L2-S1): SILT throughout lower extremity  Edema/stasis: no pitting edema  Pulse: DP 1+, PT 1+    Focused MSK " exam: R  TKA  Thrust: neg  Skin/Incision: well healed.   Effusion: trace  Alignment: neutral  Alignment correctable: na  Knee tenderness: none  Extension: passive flexion contracture 0-5° and active extension lag 0°  Flexion: 115º  Flexion stability: stable  Varus/Valgus stability: stable  Referred pain to knee with hip 90° flexion and 45° ER/IR: neg  Neurovascular    Strength: 5/5 hip/knee/ankle flexion and extension  Sensory (L2-S1): SILT throughout lower extremity  Edema/stasis: no pitting edema  Pulse: DP 1+, PT 1+    Imaging  12/20/2024 L knee radiographs ([employer], WB AP/lateral, WB AP flexion) on my read: Joint space narrowing (medial tibiofemoral severe, lateral tibiofemoral moderate, patellofemoral moderate) with osteophytes, sclerosis, and subchondral cysts. Limb alignment varus.  12/20/2024 R knee radiographs ([employer], WB AP/lateral) on my read: TKA components in acceptable position with no sign of gross implant/hardware failure.             ASSESSMENT & PLAN           Patient verbalized understanding of below A&P. All questions answered.  L knee DJD  Differential includes radicular pain from spine or referred pain from hip. H&P most consistent with knee origin.  General information  Evaluation, imaging, diagnosis, and treatment options (conservative and surgical as well as risks, benefits, recovery, and outcomes) discussed. Conservative options should be maximized and include activity modification, bracing, weight loss, PT, home exercise, local pain control (ice, heat, topicals), OTCs, and assistive devices Once exhausted, injections may provide relief. If these fail to improve pain, function, and quality of life, elective arthroplasty may be an option.  Shared decision making   Considering L primary TKA. Given extensive conservative treatments tried, continued pain, and effect on quality of life, I agree this would be a reasonable decision. Would be a good arthroplasty candidate..  Follow-up: As  "needed  R Primary TKA (Dr. Rosales, 2017)  Doing well  Follow-up: Interval XR in 5yrs (2029)  PMH, PSH, other considerations discussed  BMI<40 but on spectrum of increased risk of surgical complications.  Hx DVT/PE (\"very small\" clot in calf following previous surgery)  PCN allergy (hives)  Hoahaoism  SDD candidate  Daughter of my existing pt, Madalyn Underwood.  Friends of my existing pts Benita Gordon  Occupation: Homemaker  Hobbies: Gardening, bird watching  PCP: Tico Negro MD  "

## 2024-12-23 LAB
1OH-MIDAZOLAM UR CFM-MCNC: <25 NG/ML
7AMINOCLONAZEPAM UR CFM-MCNC: <25 NG/ML
A-OH ALPRAZ UR CFM-MCNC: <25 NG/ML
ALPRAZ UR CFM-MCNC: <25 NG/ML
CHLORDIAZEP UR CFM-MCNC: <25 NG/ML
CLONAZEPAM UR CFM-MCNC: <25 NG/ML
DIAZEPAM UR CFM-MCNC: <25 NG/ML
LORAZEPAM UR CFM-MCNC: <25 NG/ML
MIDAZOLAM UR CFM-MCNC: <25 NG/ML
NORDIAZEPAM UR CFM-MCNC: 136 NG/ML
OXAZEPAM UR CFM-MCNC: 328 NG/ML
TEMAZEPAM UR CFM-MCNC: 240 NG/ML

## 2024-12-30 ENCOUNTER — TELEPHONE (OUTPATIENT)
Dept: PRIMARY CARE | Facility: CLINIC | Age: 74
End: 2024-12-30

## 2025-01-07 PROBLEM — M17.12 ARTHRITIS OF LEFT KNEE: Status: ACTIVE | Noted: 2025-03-12

## 2025-01-26 PROBLEM — Z51.81 ENCOUNTER FOR THERAPEUTIC DRUG LEVEL MONITORING: Status: ACTIVE | Noted: 2025-01-26

## 2025-01-26 ASSESSMENT — ENCOUNTER SYMPTOMS
CONSTITUTIONAL NEGATIVE: 1
CARDIOVASCULAR NEGATIVE: 1
RESPIRATORY NEGATIVE: 1
PSYCHIATRIC NEGATIVE: 1
NEUROLOGICAL NEGATIVE: 1

## 2025-01-26 NOTE — PROGRESS NOTES
Subjective   Patient ID: Mitra Jeffries is a 74 y.o. female who presents for Follow-up and Med Refill.  Med Refill      Controlled substance renewal for diazepam for chronic anxiety disorder  Therapy has been safe effective well-tolerated  No acute complaints  EW 11/19 for ear infection  For orthopedic consultation to consider left knee TKA    Review of Systems   Constitutional: Negative.    Respiratory: Negative.     Cardiovascular: Negative.    Neurological: Negative.    Psychiatric/Behavioral: Negative.         Objective   Physical Exam  Constitutional:       General: She is not in acute distress.  Cardiovascular:      Rate and Rhythm: Normal rate and regular rhythm.      Pulses: Normal pulses.      Heart sounds: Normal heart sounds.   Pulmonary:      Effort: Pulmonary effort is normal.      Breath sounds: Normal breath sounds.   Neurological:      General: No focal deficit present.      Mental Status: She is alert.   Psychiatric:         Mood and Affect: Mood normal.         Behavior: Behavior normal.         Thought Content: Thought content normal.       /82 (BP Location: Right arm, Patient Position: Sitting, BP Cuff Size: Adult)   Pulse 77   Temp 36.4 °C (97.5 °F) (Oral)   Resp 20   Wt 85.3 kg (188 lb)   SpO2 96%   BMI 34.39 kg/m²     Data  Controlled substance patient agreement and urine tox screen 10/10/2023, [place] report  EW 11/19 ear infection and vertigo    Assessment/Plan     Anxiety disorder, chronic, well-controlled with diazepam therapy, safe and effective  Controlled substance patient agreement, urine tox screen, [place] report  DJD the knee for orthopedic consultation  Recent otitis media acknowledged  Problem List Items Addressed This Visit       Anxiety - Primary    Relevant Medications    diazePAM (Valium) 2 mg tablet    Other Relevant Orders    Drug Screen, Urine With Reflex to Confirmation (Completed)    Primary osteoarthritis of left knee    Encounter for therapeutic drug level  monitoring    Relevant Orders    Drug Screen, Urine With Reflex to Confirmation (Completed)

## 2025-02-03 ENCOUNTER — APPOINTMENT (OUTPATIENT)
Dept: RADIOLOGY | Facility: HOSPITAL | Age: 75
End: 2025-02-03
Payer: MEDICARE

## 2025-02-19 ENCOUNTER — LAB (OUTPATIENT)
Dept: LAB | Facility: HOSPITAL | Age: 75
End: 2025-02-19
Payer: MEDICARE

## 2025-02-19 ENCOUNTER — PRE-ADMISSION TESTING (OUTPATIENT)
Dept: PREADMISSION TESTING | Facility: HOSPITAL | Age: 75
End: 2025-02-19
Payer: MEDICARE

## 2025-02-19 ENCOUNTER — HOSPITAL ENCOUNTER (OUTPATIENT)
Dept: RADIOLOGY | Facility: HOSPITAL | Age: 75
Discharge: HOME | End: 2025-02-19
Payer: MEDICARE

## 2025-02-19 ENCOUNTER — EDUCATION (OUTPATIENT)
Dept: ORTHOPEDIC SURGERY | Facility: HOSPITAL | Age: 75
End: 2025-02-19
Payer: MEDICARE

## 2025-02-19 VITALS
RESPIRATION RATE: 16 BRPM | BODY MASS INDEX: 31.71 KG/M2 | TEMPERATURE: 97.9 F | DIASTOLIC BLOOD PRESSURE: 88 MMHG | WEIGHT: 179 LBS | HEIGHT: 63 IN | SYSTOLIC BLOOD PRESSURE: 169 MMHG | OXYGEN SATURATION: 96 % | HEART RATE: 77 BPM

## 2025-02-19 DIAGNOSIS — M25.562 CHRONIC PAIN OF LEFT KNEE: ICD-10-CM

## 2025-02-19 DIAGNOSIS — G89.29 CHRONIC PAIN OF LEFT KNEE: ICD-10-CM

## 2025-02-19 DIAGNOSIS — Z01.818 PREOP TESTING: Primary | ICD-10-CM

## 2025-02-19 DIAGNOSIS — M17.12 ARTHRITIS OF LEFT KNEE: ICD-10-CM

## 2025-02-19 DIAGNOSIS — Z01.818 ENCOUNTER FOR OTHER PREPROCEDURAL EXAMINATION: Primary | ICD-10-CM

## 2025-02-19 LAB
ALBUMIN SERPL BCP-MCNC: 4.6 G/DL (ref 3.4–5)
ALP SERPL-CCNC: 58 U/L (ref 33–136)
ALT SERPL W P-5'-P-CCNC: 13 U/L (ref 7–45)
ANION GAP SERPL CALC-SCNC: 17 MMOL/L (ref 10–20)
AST SERPL W P-5'-P-CCNC: 16 U/L (ref 9–39)
BASOPHILS # BLD AUTO: 0.04 X10*3/UL (ref 0–0.1)
BASOPHILS NFR BLD AUTO: 0.6 %
BILIRUB SERPL-MCNC: 0.5 MG/DL (ref 0–1.2)
BUN SERPL-MCNC: 17 MG/DL (ref 6–23)
CALCIUM SERPL-MCNC: 10.1 MG/DL (ref 8.6–10.3)
CHLORIDE SERPL-SCNC: 102 MMOL/L (ref 98–107)
CO2 SERPL-SCNC: 29 MMOL/L (ref 21–32)
CREAT SERPL-MCNC: 0.52 MG/DL (ref 0.5–1.05)
EGFRCR SERPLBLD CKD-EPI 2021: >90 ML/MIN/1.73M*2
EOSINOPHIL # BLD AUTO: 0.11 X10*3/UL (ref 0–0.4)
EOSINOPHIL NFR BLD AUTO: 1.6 %
ERYTHROCYTE [DISTWIDTH] IN BLOOD BY AUTOMATED COUNT: 12.7 % (ref 11.5–14.5)
GLUCOSE SERPL-MCNC: 93 MG/DL (ref 74–99)
HCT VFR BLD AUTO: 43.9 % (ref 36–46)
HGB BLD-MCNC: 14.8 G/DL (ref 12–16)
IMM GRANULOCYTES # BLD AUTO: 0.01 X10*3/UL (ref 0–0.5)
IMM GRANULOCYTES NFR BLD AUTO: 0.1 % (ref 0–0.9)
LYMPHOCYTES # BLD AUTO: 1.18 X10*3/UL (ref 0.8–3)
LYMPHOCYTES NFR BLD AUTO: 17.3 %
MCH RBC QN AUTO: 29.1 PG (ref 26–34)
MCHC RBC AUTO-ENTMCNC: 33.7 G/DL (ref 32–36)
MCV RBC AUTO: 86 FL (ref 80–100)
MONOCYTES # BLD AUTO: 0.64 X10*3/UL (ref 0.05–0.8)
MONOCYTES NFR BLD AUTO: 9.4 %
NEUTROPHILS # BLD AUTO: 4.83 X10*3/UL (ref 1.6–5.5)
NEUTROPHILS NFR BLD AUTO: 71 %
NRBC BLD-RTO: NORMAL /100{WBCS}
PLATELET # BLD AUTO: 335 X10*3/UL (ref 150–450)
POTASSIUM SERPL-SCNC: 4.4 MMOL/L (ref 3.5–5.3)
PROT SERPL-MCNC: 7.3 G/DL (ref 6.4–8.2)
RBC # BLD AUTO: 5.08 X10*6/UL (ref 4–5.2)
SODIUM SERPL-SCNC: 144 MMOL/L (ref 136–145)
WBC # BLD AUTO: 6.8 X10*3/UL (ref 4.4–11.3)

## 2025-02-19 PROCEDURE — 93005 ELECTROCARDIOGRAM TRACING: CPT

## 2025-02-19 PROCEDURE — 99204 OFFICE O/P NEW MOD 45 MIN: CPT

## 2025-02-19 PROCEDURE — 77073 BONE LENGTH STUDIES: CPT

## 2025-02-19 PROCEDURE — 93010 ELECTROCARDIOGRAM REPORT: CPT | Performed by: INTERNAL MEDICINE

## 2025-02-19 PROCEDURE — 80053 COMPREHEN METABOLIC PANEL: CPT

## 2025-02-19 PROCEDURE — 87081 CULTURE SCREEN ONLY: CPT | Mod: BEALAB

## 2025-02-19 PROCEDURE — 85025 COMPLETE CBC W/AUTO DIFF WBC: CPT

## 2025-02-19 PROCEDURE — 73700 CT LOWER EXTREMITY W/O DYE: CPT | Mod: LT

## 2025-02-19 RX ORDER — IBUPROFEN 200 MG
TABLET ORAL 2 TIMES DAILY PRN
COMMUNITY

## 2025-02-19 RX ORDER — CHLORHEXIDINE GLUCONATE 40 MG/ML
SOLUTION TOPICAL DAILY
Qty: 470 ML | Refills: 0 | Status: SHIPPED | OUTPATIENT
Start: 2025-02-19 | End: 2025-02-24

## 2025-02-19 RX ORDER — CHLORHEXIDINE GLUCONATE ORAL RINSE 1.2 MG/ML
15 SOLUTION DENTAL DAILY
Qty: 30 ML | Refills: 0 | Status: SHIPPED | OUTPATIENT
Start: 2025-02-19 | End: 2025-02-21

## 2025-02-19 ASSESSMENT — DUKE ACTIVITY SCORE INDEX (DASI)
CAN YOU HAVE SEXUAL RELATIONS: YES
CAN YOU PARTICIPATE IN MODERATE RECREATIONAL ACTIVITIES LIKE GOLF, BOWLING, DANCING, DOUBLES TENNIS OR THROWING A BASEBALL OR FOOTBALL: NO
CAN YOU WALK A BLOCK OR TWO ON LEVEL GROUND: YES
CAN YOU TAKE CARE OF YOURSELF (EAT, DRESS, BATHE, OR USE TOILET): YES
CAN YOU DO YARD WORK LIKE RAKING LEAVES, WEEDING OR PUSHING A MOWER: NO
TOTAL_SCORE: 18.7
DASI METS SCORE: 5
CAN YOU PARTICIPATE IN STRENOUS SPORTS LIKE SWIMMING, SINGLES TENNIS, FOOTBALL, BASKETBALL, OR SKIING: NO
CAN YOU RUN A SHORT DISTANCE: NO
CAN YOU DO LIGHT WORK AROUND THE HOUSE LIKE DUSTING OR WASHING DISHES: YES
CAN YOU DO HEAVY WORK AROUND THE HOUSE LIKE SCRUBBING FLOORS OR LIFTING AND MOVING HEAVY FURNITURE: NO
CAN YOU WALK INDOORS, SUCH AS AROUND YOUR HOUSE: YES
CAN YOU DO MODERATE WORK AROUND THE HOUSE LIKE VACUUMING, SWEEPING FLOORS OR CARRYING GROCERIES: YES
CAN YOU CLIMB A FLIGHT OF STAIRS OR WALK UP A HILL: NO

## 2025-02-19 ASSESSMENT — PAIN DESCRIPTION - DESCRIPTORS: DESCRIPTORS: OTHER (COMMENT)

## 2025-02-19 ASSESSMENT — PAIN - FUNCTIONAL ASSESSMENT: PAIN_FUNCTIONAL_ASSESSMENT: 0-10

## 2025-02-19 ASSESSMENT — PAIN SCALES - GENERAL: PAINLEVEL_OUTOF10: 5 - MODERATE PAIN

## 2025-02-19 NOTE — H&P (VIEW-ONLY)
CPM/PAT Evaluation       Name: Mitra Jeffries (Mitra Jeffries)  /Age: 1950/74 y.o.     In-Person       Chief Complaint: Arthritis of left knee     HPI  Patient is an alert and oriented 74 year old female scheduled for a left total knee arthroplasty on 3/12/2025 with Dr. Mills for arthritis of left knee. She endorses left knee pain that she currently rates at a 6/10 which worsens during ambulation and activity. She is ambulatory with a walker with a current METS score of 5. PMHX includes OA, obesity, DVT, HTN, and anxiety. Presents to [facility] PAT today for perioperative risk stratification and optimization.     Past Medical History:   Diagnosis Date    Dermatitis 2023    Diverticulosis of intestine, part unspecified, without perforation or abscess without bleeding 03/10/2014    Diverticulosis    Personal history of (healed) traumatic fracture 03/10/2014    History of fracture of toe    Personal history of other diseases of the nervous system and sense organs 03/10/2014    History of migraine    Personal history of other venous thrombosis and embolism 2019    History of deep vein thrombosis (DVT) of lower extremity    Personal history of pulmonary embolism 2020    History of pulmonary embolism    Personal history of urinary (tract) infections 03/10/2014    History of cystitis    Serous otitis media 2023    Sinusitis 2023     Past Surgical History:   Procedure Laterality Date    APPENDECTOMY  03/10/2014    Appendectomy     SECTION, CLASSIC  2013     Section    CHOLECYSTECTOMY  2013    Cholecystectomy    OTHER SURGICAL HISTORY  2016    Closed Treatment Of Fractured Toe    OTHER SURGICAL HISTORY  2016    Arthrocentesis Injection Of Knee Joint    TONSILLECTOMY  03/10/2014    Tonsillectomy With Adenoidectomy    TOTAL KNEE ARTHROPLASTY  2018    Total Knee Replacement Right     Patient  has no history on file for sexual activity.    No family  history on file.    Allergies   Allergen Reactions    Codeine Unknown    Penicillins Unknown    Shellfish Containing Products Unknown    Sulfa (Sulfonamide Antibiotics) Unknown     Prior to Admission medications    Medication Sig Start Date End Date Taking? Authorizing Provider   amLODIPine (Norvasc) 10 mg tablet Take 1 tablet (10 mg) by mouth once daily. 3/19/24  Yes Tico Negro MD   cyanocobalamin, vitamin B-12, (VITAMIN B-12 ORAL) Take by mouth once daily as needed.   Yes Historical Provider, MD   diazePAM (Valium) 2 mg tablet Take 1 tablet (2 mg) by mouth every 8 hours if needed for anxiety. 12/19/24 3/19/25 Yes Tico Negro MD   hydroCHLOROthiazide (HYDRODiuril) 25 mg tablet Take 1 tablet (25 mg) by mouth once daily. 11/20/24  Yes Tico Negro MD   ibuprofen 200 mg tablet Take by mouth 2 times a day as needed for mild pain (1 - 3).   Yes Historical Provider, MD   meclizine (Antivert) 25 mg tablet Take 1 tablet (25 mg) by mouth every 8 hours if needed for dizziness (may cause sleepiness) for up to 21 doses.  Patient not taking: Reported on 2/19/2025 11/19/24   Moshe Finley PA-C   ergocalciferol (Vitamin D-2) 1.25 MG (43307 UT) capsule Take 1 capsule (1,250 mcg) by mouth every 7 days. 1/27/23 2/19/25  Historical Provider, MD       Review of Systems   Constitutional: Negative for chills, decreased appetite, diaphoresis, fever and malaise/fatigue.   Eyes:  Negative for blurred vision and double vision.   Cardiovascular:  Negative for chest pain, claudication, cyanosis, dyspnea on exertion, irregular heartbeat, leg swelling, near-syncope and palpitations.   Respiratory:  Negative for cough, hemoptysis, shortness of breath and wheezing.    Endocrine: Negative for cold intolerance, heat intolerance, polydipsia, polyphagia and polyuria.   Gastrointestinal:  Negative for abdominal pain, constipation, diarrhea, dysphagia, nausea and vomiting.   Genitourinary:  Negative for bladder incontinence, dysuria,  "hematuria, incomplete emptying, nocturia, frequency, pelvic pain and urgency.   Neurological:  Negative for headaches, light-headedness, paresthesias, sensory change and weakness.   Psychiatric/Behavioral:  Negative for altered mental status.    Musculoskeletal: Negative for myalgias. Positive for arthralgias     Vitals and nursing note reviewed.     Physical exam  Constitutional:       Appearance: Normal appearance. She is Obese.   HENT:      Head: Normocephalic and atraumatic.      Mouth/Throat:      Mouth: Mucous membranes are moist.      Pharynx: Oropharynx is clear.   Eyes:      Extraocular Movements: Extraocular movements intact.      Conjunctiva/sclera: Conjunctivae normal.      Pupils: Pupils are equal, round, and reactive to light.   Cardiovascular:      PMI at left midclavicular line. Normal rate. Regular rhythm. Normal S1. Normal S2.       Murmurs: There is no murmur.      No gallop.  No click. No rub.       No audible carotid bruit     No lower extremity edema on exam  Pulmonary:      Effort: Pulmonary effort is normal.      Breath sounds: Normal breath sounds.   Abdominal:      General: Abdomen is flat. Bowel sounds are normal.      Palpations: Abdomen is soft and non-tender  Musculoskeletal:      Cervical back: Normal range of motion and neck supple.      Knee, left: Limited ROM  Skin:     General: Skin is warm and dry.      Capillary Refill: Capillary refill takes less than 2 seconds.   Neurological:      General: No focal deficit present.      Mental Status: She is alert and oriented to person, place, and time. Mental status is at baseline.   Psychiatric:         Mood and Affect: Mood normal.         Behavior: Behavior normal.         Thought Content: Thought content normal.         Judgment: Judgment normal.     Vitals and nursing note reviewed. Physical exam within normal limits.     Visit Vitals  /88   Pulse 77   Temp 36.6 °C (97.9 °F) (Temporal)   Resp 16   Ht 1.588 m (5' 2.5\")   Wt 81.2 kg " (179 lb)   SpO2 96%   BMI 32.22 kg/m²   Smoking Status Former   BSA 1.89 m²     DASI Risk Score      Flowsheet Row Pre-Admission Testing from 2/19/2025 in [facility]   Can you take care of yourself (eat, dress, bathe, or use toilet)?  2.75 filed at 02/19/2025 1457   Can you walk indoors, such as around your house? 1.75 filed at 02/19/2025 1457   Can you walk a block or two on level ground?  2.75 filed at 02/19/2025 1457   Can you climb a flight of stairs or walk up a hill? 0 filed at 02/19/2025 1457   Can you run a short distance? 0 filed at 02/19/2025 1457   Can you do light work around the house like dusting or washing dishes? 2.7 filed at 02/19/2025 1457   Can you do moderate work around the house like vacuuming, sweeping floors or carrying groceries? 3.5 filed at 02/19/2025 1457   Can you do heavy work around the house like scrubbing floors or lifting and moving heavy furniture?  0 filed at 02/19/2025 1457   Can you do yard work like raking leaves, weeding or pushing a mower? 0 filed at 02/19/2025 1457   Can you have sexual relations? 5.25 filed at 02/19/2025 1457   Can you participate in moderate recreational activities like golf, bowling, dancing, doubles tennis or throwing a baseball or football? 0 filed at 02/19/2025 1457   Can you participate in strenous sports like swimming, singles tennis, football, basketball, or skiing? 0 filed at 02/19/2025 1457   DASI SCORE 18.7 filed at 02/19/2025 1457   METS Score (Will be calculated only when all the questions are answered) 5 filed at 02/19/2025 1457          Caprini DVT Assessment      Flowsheet Row Pre-Admission Testing from 2/19/2025 in [facility]   DVT Score (IF A SCORE IS NOT CALCULATING, MUST SELECT A BMI TO COMPLETE) 13 filed at 02/19/2025 1456   Medical Factors History of DVT/PE filed at 02/19/2025 1456   Surgical Factors Elective major lower extremity arthroplasty filed at 02/19/2025 1456   BMI (BMI MUST BE CHOSEN) 31-40  (Obesity) filed at 02/19/2025 1456          Modified Frailty Index      Flowsheet Row Pre-Admission Testing from 2/19/2025 in [facility]   Non-independent functional status (problems with dressing, bathing, personal grooming, or cooking) 0 filed at 02/19/2025 1457   History of diabetes mellitus  0 filed at 02/19/2025 1457   History of COPD 0 filed at 02/19/2025 1457   History of CHF No filed at 02/19/2025 1457   History of MI 0 filed at 02/19/2025 1457   History of Percutaneous Coronary Intervention, Cardiac Surgery, or Angina No filed at 02/19/2025 1457   Hypertension requiring the use of medication  0.0909 filed at 02/19/2025 1457   Peripheral vascular disease 0 filed at 02/19/2025 1457   Impaired sensorium (cognitive impairement or loss, clouding, or delirium) 0 filed at 02/19/2025 1457   TIA or CVA withouy residual deficit 0 filed at 02/19/2025 1457   Cerebrovascular accident with deficit 0 filed at 02/19/2025 1457   Modified Frailty Index Calculator .0909 filed at 02/19/2025 1457          CHADS2 Stroke Risk  Current as of 22 minutes ago        N/A 3 to 100%: High Risk   2 to < 3%: Medium Risk   0 to < 2%: Low Risk     Last Change: N/A          This score determines the patient's risk of having a stroke if the patient has atrial fibrillation.        This score is not applicable to this patient. Components are not calculated.          Revised Cardiac Risk Index      Flowsheet Row Pre-Admission Testing from 2/19/2025 in [facility]   High-Risk Surgery (Intraperitoneal, Intrathoracic,Suprainguinal vascular) 0 filed at 02/19/2025 1457   History of ischemic heart disease (History of MI, History of positive exercuse test, Current chest paint considered due to myocardial ischemia, Use of nitrate therapy, ECG with pathological Q Waves) 0 filed at 02/19/2025 1457   History of congestive heart failure (pulmonary edemia, bilateral rales or S3 gallop, Paroxysmal nocturnal dyspnea, CXR  showing pulmonary vascular redistribution) 0 filed at 02/19/2025 1457   History of cerebrovascular disease (Prior TIA or stroke) 0 filed at 02/19/2025 1457   Pre-operative insulin treatment 0 filed at 02/19/2025 1457   Pre-operative creatinine>2 mg/dl 0 filed at 02/19/2025 1457   Revised Cardiac Risk Calculator 0 filed at 02/19/2025 1457          Apfel Simplified Score    No data to display       Risk Analysis Index Results This Encounter    No data found in the last 10 encounters.       Stop Bang Score      Flowsheet Row Pre-Admission Testing from 2/19/2025 in [facility]   Do you snore loudly? 0 filed at 02/19/2025 1350   Do you often feel tired or fatigued after your sleep? 0 filed at 02/19/2025 1350   Has anyone ever observed you stop breathing in your sleep? 0 filed at 02/19/2025 1350   Do you have or are you being treated for high blood pressure? 1 filed at 02/19/2025 1350   Recent BMI (Calculated) 32.2 filed at 02/19/2025 1350   Is BMI greater than 35 kg/m2? 0=No filed at 02/19/2025 1350   Age older than 50 years old? 1=Yes filed at 02/19/2025 1350   Is your neck circumference greater than 17 inches (Male) or 16 inches (Female)? 1 filed at 02/19/2025 1350   Gender - Male 0=No filed at 02/19/2025 1350   STOP-BANG Total Score 3 filed at 02/19/2025 1350          Prodigy: High Risk  Total Score: 12              Prodigy Age Score           ARISCAT Score for Postoperative Pulmonary Complications      Flowsheet Row Pre-Admission Testing from 2/19/2025 in [facility]   Age Calculated Score 3 filed at 02/19/2025 1457   Preoperative SpO2 0 filed at 02/19/2025 1457   Respiratory infection in the last month Either upper or lower (i.e., URI, bronchitis, pneumonia), with fever and antibiotic treatment 0 filed at 02/19/2025 1457   Preoperative anemia (Hgb less than 10 g/dl) 0 filed at 02/19/2025 1457   Surgical incision  0 filed at 02/19/2025 1457   Duration of surgery  16 filed at 02/19/2025  1457   Emergency Procedure  0 filed at 02/19/2025 1457   ARISCAT Total Score  19 filed at 02/19/2025 1457          Monika Perioperative Risk for Myocardial Infarction or Cardiac Arrest (JORDAN)      Flowsheet Row Pre-Admission Testing from 2/19/2025 in [facility]   Calculated Age Score 1.48 filed at 02/19/2025 1457   Functional Status  0 filed at 02/19/2025 1457   ASA Class  -3.29 filed at 02/19/2025 1457   Creatinine 0 filed at 02/19/2025 1457   Type of Procedure  0.80 filed at 02/19/2025 1457   JORDAN Total Score  -6.26 filed at 02/19/2025 1457   JORDAN % 0.19 filed at 02/19/2025 1457          Assessment & Plan:    Neuro:  No diagnosis or significant findings on chart review or clinical presentation and evaluation.     HEENT/Airway:  No diagnosis or significant findings on chart review or clinical presentation and evaluation.   STOP-BANG Score-3 points moderate risk for NAZ    Mallampati::  III    TM distance::  >3 FB    Neck ROM::  Full  Dentures-denies  Crowns-reports x 1  Implants-denies    Cardiovascular:  No significant findings on chart review or clinical presentation and evaluation.   History of Hypertension-Managed with Amlodipine and hydrochlorothiazide. BP in PAT was 169/88. Patient was encouraged to self monitor her BP at home and report results to their PCP.  History of DVT/PE-Patient reports DVT post op 7 years ago. States she was briefly treated with OAC which was DC. No reoccurrence.     METS: 5  RCRI: 0 points, 3.9%  risk for postoperative MACE   JORDAN: 0.19% risk for postoperative MACE  EKG -normal sinus rhythm and left bundle branch block Rate-83 No acute changes. Unchanged from previous EKG.       Pulmonary:  No diagnosis or significant findings on chart review or clinical presentation and evaluation.   ARISCAT: <26 points, 1.6% risk of in-hospital postoperative pulmonary complication  PRODIGY: Moderate risk for opioid induced respiratory depression  Smoking History-She has never  smoked.  Discussed smoking cessation and deep breathing handout given    Renal/Urinary:  No diagnosis or significant findings on chart review or clinical presentation and evaluation, however, the patient is at increased risk of perioperative renal complications secondary to age>/= 56 and HTN. Preventative measures include BP monitoring, medication compliance, and hydration management.   CMP-Reviewed, stable  Creatinine-0.52  GFR-    Endocrine:  No diagnosis or significant findings on chart review or clinical presentation and evaluation.   [id number]-8.2%    Hematologic/Immunology:  No diagnosis or significant findings on chart review or clinical presentation and evaluation.  The patient is not a Jehovah’s witness and will accept blood and blood products if medically indicated.   History of previous blood transfusions No  CBC-Reviewed, stable  HGB-14.8  Caprini Score 13, patient at High for postoperative DVT. Pt supplied education/VTE handout  Anticoagulation use: No     Gastrointestinal:   No diagnosis or significant findings on chart review or clinical presentation and evaluation.   Recreational drug use: none  Alcohol use none    Infectious disease:   No diagnosis or significant findings on chart review or clinical presentation and evaluation.   Prescription provided for CHG body wash and dental rinse. CHG use instructions reviewed and provided to patient.  Staph screen collected-Negative    Musculoskeletal:   No significant findings on chart review or clinical presentation and evaluation.   Positive for Obesity-BMI 32.22  JHFRAT score-11 points. moderate risk for falls    Anesthesia:  ASA 2 - Patient with mild systemic disease with no functional limitations  Anticipated anesthesia-spinal  History of General anesthesia- yes  Complications- No anesthesia complications  No family history of anesthesia complications    Abnormalities noted on PAT evaluation: No    Labs & Imaging ordered:  CBC, CMP, HBA1C, MRSA,  EKG  Nickel/metal allergy-negative  Shellfish allergy-positive    Overall, patient Low Risk for the scheduled Moderate Risk surgery. Discussed with patient medication instructions, NPO guidelines, and any questions or concerns.     Face to face time spent 45 minutes     Cardiac clearance for elective LTKA provided by Dr Le cardiology. See note from 3/5/2025

## 2025-02-19 NOTE — GROUP NOTE
In addition to the group class activities, Mitra Jeffries had the following lab work completed:  No orders of the defined types were placed in this encounter.      A new History and Physical was not completed.    This class lasted approximately 2 hour and had 6 participants. The nurse instructor covered the following topics:    MyChart Enrollment  Communication with Care Team  My Chart is the best form of communication to reach all of your caregivers  You can send messages to specific care givers, or a care team  Continued Education  You will be enrolled in a Joint Replacement care plan to receive additional education before and after surgery  You can review a short recording of the class content - [email or web address]  Access to Medical Records  You can access test results, office notes, appointments, etc.  You can connect to other healthcare systems who use [employer] ([facility], [facility], [facility], etc.)  [employer]  Program Information  Consent to Enroll    Background/Understanding of Joint Replacement Surgery  Potential for same day discharge  Any questions or concerns to be directed to the surgeon's office  Not all patients are appropriate for same day discharge  All patients will be required to meet discharge criteria prior to leaving our care    How to Prepare for Surgery  Use of Recreational Products (Nicotine, Alcohol, THC, CBD, Drugs, Etc.)  The ultimate goal is to quit using thee products!  Stop several weeks before surgery  Such products slow down the healing process and increase risk of post-op infection and complications  Clearance for Surgery  Preadmission Testing - Appropriateness for anesthesia  Medical Clearance by Specialists  Dental Clearance  Cracked/Broken/Loose teeth left untreated may postpone surgery  The importance of post-op antibiotics for dental visits per surgeon protocol  Preadmission Testing  **Potential for postponed surgery if appropriate clearance is not  obtained  Medication Instruction  Follow instructions provided by the doctor who prescribes your medication (typically, but not limited to cardiologist)  Preadmission testing will provide additional instructions during your appointment on what to stop and what to take as you get closer to surgery  For clarification of these instructions, please call preadmission testing directly - [phone]  Tips for Preparing the home for discharge from the hospital  Care Partner  Requirement for surgery, the patient must have a plan to have help at home  Potential for postponed surgery if plan for home support cannot be established  Your Care Partner does not need medical training  How the care partner can help after surgery  CHG Body Wash/Mouth Wash  Follow the instructions given at preadmission testing  Body wash is to be used on the body and hair for 5 washes  Mouthwash is to be used the night before and morning of surgery  **This is a system-wide protocol developed by infectious disease professionals, we will not alter our recommendations for those with sensitive skin or those who have special hair needs.  Please follow the instructions as they are written as this will provide the best infection prevention measures for surgery.  Should you have an allergy to one of the products, please discuss with your preadmission team**    What to Expect in the Hospital/At Home  Morning of Surgery NPO Guidelines  Nothing to eat after midnight  Water can be consumed up to 2 hours prior to arrival  Surgical and Post-Surgical Care Team  Surgical Team  Anesthesia Team  Nursing  Physical Therapy  Care Coordinating  Pharmacy  Hospital Arrival Instructions  Arrive at the time provided to you  Consider traffic patterns (rush-hour) based on arrival time  Have arrangements made for a ride home  If discharging same day, care partner should remain at the hospital  Recovering after Surgery  Recovery Room - Visitors are not brought back  Transition to  hospital room - 2nd Floor, Visitors will be directed to your room  The presence of and strategies for controlling surgical pain and swelling  The importance of early mobility  Side effects after surgery  What to expect if staying overnight    Discharge Planning  The intended plan for discharge will be for patients to discharge home  All patients require a care partner (family, friend, neighbor, etc.) to stay with the patient for the first few nights after surgery  The inability to secure help at home may postpone surgery  Home Care Services set up per surgeon order  Physical Therapy  Occupational Therapy  **If desired, private duty care can be arranged by the patient ahead of time**  Outpatient Physical Therapy per surgeon order    Recovering at Home  Wound Care  Follow wound care instructions found in your discharge paperwork  Bandage is water-resistant and you may shower with the bandage  Do not scrub directly over the bandage  Do not submerge in water until cleared (bathtub, hot tub, pool, etc.)    Post-Op Risk Prevention  Infection Prevention  Promptly seek treatment for any infections post-operatively  Routine dental visits must be postponed for 3 months after surgery  Your surgeon may require antibiotics prior to future dental visits  Any concerns for infection not related directly to the knee or the hip should be managed by your primary care provider  Blood Clots  Be sure to complete the course of blood thinning medication as prescribed by your surgeon  Movement every 1-2 hours during the day is encouraged to prevent blood clots  Monitor for signs of blood clots  Wear compression stockings as prescribed by your surgeon  Constipation  Constipation is common following surgery  Drink plenty of fluids  Take stool softener/laxative as prescribed by your surgeon  Move around frequently  Eat foods high in fiber  Fall Prevention  Prepare home ahead of time to clear space to move with walker  Remove throw rugs and  electrical cords from walkways  Install railings near any stairways with more than 2 steps  Use night lights for increased visibility at night  Continue to use your assistive device until cleared by surgeon or physical therapy  Dislocation Prevention - Not all procedures will have dislocation precautions  Follow dislocation precautions provided by your surgeon  It is OK to resume sexual activity about 6 weeks following surgery  Be sure to follow any dislocation precautions assigned    Durable Medical Equipment  Cold Therapy  Breg Cold Therapy Machines  Ice/Gel Packs  Assistive Devices  Folding Walker with Wheels (in the front only)  No Rollators  Crutches if approved by Physical Therapy and Surgeon after surgery  Hip Kits  Raised Toilet Seats  Additional Compression Stockings    Joint Preservation  Healthy Activities when Cleared  Walking  Swimming  Bike Riding  Activities to Avoid  Refrain from repetitive motions which have a high impact on the joint  Gradual Progression  Progress activity slowly, listen to your body  Common Findings - NORMAL after surgery  Clicking/Grinding  Numbness near incision    Physical Therapy  Prehabilitation exercises  START TODAY ON BOTH LEGS  Surgery Specific Precautions  Follow surgery specific precautions found in your discharge paperwork    Follow-Up Visit  All patients will see their surgeon for a follow up visit after surgery  The visit may range from 2-6 weeks after surgery and is surgeon specific      Please don't hesitate to reach out if you have any additional questions or concerns.    Jessie Dunaway MBA, BSN, RN-BC, ONC  MACRI Haas, RN  Diamond Bustillo, SNEHA  Orthopedic Program Navigators  [facility]   [phone]

## 2025-02-19 NOTE — CPM/PAT H&P
CPM/PAT Evaluation       Name: Mitra Jeffries (Mitra Jeffries)  /Age: 1950/74 y.o.     In-Person       Chief Complaint: Arthritis of left knee     HPI  Patient is an alert and oriented 74 year old female scheduled for a left total knee arthroplasty on 3/12/2025 with Dr. Mills for arthritis of left knee. She endorses left knee pain that she currently rates at a 6/10 which worsens during ambulation and activity. She is ambulatory with a walker with a current METS score of 5. PMHX includes OA, obesity, DVT, HTN, and anxiety. Presents to [facility] PAT today for perioperative risk stratification and optimization.     Past Medical History:   Diagnosis Date    Dermatitis 2023    Diverticulosis of intestine, part unspecified, without perforation or abscess without bleeding 03/10/2014    Diverticulosis    Personal history of (healed) traumatic fracture 03/10/2014    History of fracture of toe    Personal history of other diseases of the nervous system and sense organs 03/10/2014    History of migraine    Personal history of other venous thrombosis and embolism 2019    History of deep vein thrombosis (DVT) of lower extremity    Personal history of pulmonary embolism 2020    History of pulmonary embolism    Personal history of urinary (tract) infections 03/10/2014    History of cystitis    Serous otitis media 2023    Sinusitis 2023     Past Surgical History:   Procedure Laterality Date    APPENDECTOMY  03/10/2014    Appendectomy     SECTION, CLASSIC  2013     Section    CHOLECYSTECTOMY  2013    Cholecystectomy    OTHER SURGICAL HISTORY  2016    Closed Treatment Of Fractured Toe    OTHER SURGICAL HISTORY  2016    Arthrocentesis Injection Of Knee Joint    TONSILLECTOMY  03/10/2014    Tonsillectomy With Adenoidectomy    TOTAL KNEE ARTHROPLASTY  2018    Total Knee Replacement Right     Patient  has no history on file for sexual activity.    No family  history on file.    Allergies   Allergen Reactions    Codeine Unknown    Penicillins Unknown    Shellfish Containing Products Unknown    Sulfa (Sulfonamide Antibiotics) Unknown     Prior to Admission medications    Medication Sig Start Date End Date Taking? Authorizing Provider   amLODIPine (Norvasc) 10 mg tablet Take 1 tablet (10 mg) by mouth once daily. 3/19/24  Yes Tico Negro MD   cyanocobalamin, vitamin B-12, (VITAMIN B-12 ORAL) Take by mouth once daily as needed.   Yes Historical Provider, MD   diazePAM (Valium) 2 mg tablet Take 1 tablet (2 mg) by mouth every 8 hours if needed for anxiety. 12/19/24 3/19/25 Yes Tico Negro MD   hydroCHLOROthiazide (HYDRODiuril) 25 mg tablet Take 1 tablet (25 mg) by mouth once daily. 11/20/24  Yes Tico Negro MD   ibuprofen 200 mg tablet Take by mouth 2 times a day as needed for mild pain (1 - 3).   Yes Historical Provider, MD   meclizine (Antivert) 25 mg tablet Take 1 tablet (25 mg) by mouth every 8 hours if needed for dizziness (may cause sleepiness) for up to 21 doses.  Patient not taking: Reported on 2/19/2025 11/19/24   Moshe Finley PA-C   ergocalciferol (Vitamin D-2) 1.25 MG (46512 UT) capsule Take 1 capsule (1,250 mcg) by mouth every 7 days. 1/27/23 2/19/25  Historical Provider, MD       Review of Systems   Constitutional: Negative for chills, decreased appetite, diaphoresis, fever and malaise/fatigue.   Eyes:  Negative for blurred vision and double vision.   Cardiovascular:  Negative for chest pain, claudication, cyanosis, dyspnea on exertion, irregular heartbeat, leg swelling, near-syncope and palpitations.   Respiratory:  Negative for cough, hemoptysis, shortness of breath and wheezing.    Endocrine: Negative for cold intolerance, heat intolerance, polydipsia, polyphagia and polyuria.   Gastrointestinal:  Negative for abdominal pain, constipation, diarrhea, dysphagia, nausea and vomiting.   Genitourinary:  Negative for bladder incontinence, dysuria,  "hematuria, incomplete emptying, nocturia, frequency, pelvic pain and urgency.   Neurological:  Negative for headaches, light-headedness, paresthesias, sensory change and weakness.   Psychiatric/Behavioral:  Negative for altered mental status.    Musculoskeletal: Negative for myalgias. Positive for arthralgias     Vitals and nursing note reviewed.     Physical exam  Constitutional:       Appearance: Normal appearance. She is Obese.   HENT:      Head: Normocephalic and atraumatic.      Mouth/Throat:      Mouth: Mucous membranes are moist.      Pharynx: Oropharynx is clear.   Eyes:      Extraocular Movements: Extraocular movements intact.      Conjunctiva/sclera: Conjunctivae normal.      Pupils: Pupils are equal, round, and reactive to light.   Cardiovascular:      PMI at left midclavicular line. Normal rate. Regular rhythm. Normal S1. Normal S2.       Murmurs: There is no murmur.      No gallop.  No click. No rub.       No audible carotid bruit     No lower extremity edema on exam  Pulmonary:      Effort: Pulmonary effort is normal.      Breath sounds: Normal breath sounds.   Abdominal:      General: Abdomen is flat. Bowel sounds are normal.      Palpations: Abdomen is soft and non-tender  Musculoskeletal:      Cervical back: Normal range of motion and neck supple.      Knee, left: Limited ROM  Skin:     General: Skin is warm and dry.      Capillary Refill: Capillary refill takes less than 2 seconds.   Neurological:      General: No focal deficit present.      Mental Status: She is alert and oriented to person, place, and time. Mental status is at baseline.   Psychiatric:         Mood and Affect: Mood normal.         Behavior: Behavior normal.         Thought Content: Thought content normal.         Judgment: Judgment normal.     Vitals and nursing note reviewed. Physical exam within normal limits.     Visit Vitals  /88   Pulse 77   Temp 36.6 °C (97.9 °F) (Temporal)   Resp 16   Ht 1.588 m (5' 2.5\")   Wt 81.2 kg " (179 lb)   SpO2 96%   BMI 32.22 kg/m²   Smoking Status Former   BSA 1.89 m²     DASI Risk Score      Flowsheet Row Pre-Admission Testing from 2/19/2025 in [facility]   Can you take care of yourself (eat, dress, bathe, or use toilet)?  2.75 filed at 02/19/2025 1457   Can you walk indoors, such as around your house? 1.75 filed at 02/19/2025 1457   Can you walk a block or two on level ground?  2.75 filed at 02/19/2025 1457   Can you climb a flight of stairs or walk up a hill? 0 filed at 02/19/2025 1457   Can you run a short distance? 0 filed at 02/19/2025 1457   Can you do light work around the house like dusting or washing dishes? 2.7 filed at 02/19/2025 1457   Can you do moderate work around the house like vacuuming, sweeping floors or carrying groceries? 3.5 filed at 02/19/2025 1457   Can you do heavy work around the house like scrubbing floors or lifting and moving heavy furniture?  0 filed at 02/19/2025 1457   Can you do yard work like raking leaves, weeding or pushing a mower? 0 filed at 02/19/2025 1457   Can you have sexual relations? 5.25 filed at 02/19/2025 1457   Can you participate in moderate recreational activities like golf, bowling, dancing, doubles tennis or throwing a baseball or football? 0 filed at 02/19/2025 1457   Can you participate in strenous sports like swimming, singles tennis, football, basketball, or skiing? 0 filed at 02/19/2025 1457   DASI SCORE 18.7 filed at 02/19/2025 1457   METS Score (Will be calculated only when all the questions are answered) 5 filed at 02/19/2025 1457          Caprini DVT Assessment      Flowsheet Row Pre-Admission Testing from 2/19/2025 in [facility]   DVT Score (IF A SCORE IS NOT CALCULATING, MUST SELECT A BMI TO COMPLETE) 13 filed at 02/19/2025 1456   Medical Factors History of DVT/PE filed at 02/19/2025 1456   Surgical Factors Elective major lower extremity arthroplasty filed at 02/19/2025 1456   BMI (BMI MUST BE CHOSEN) 31-40  (Obesity) filed at 02/19/2025 1456          Modified Frailty Index      Flowsheet Row Pre-Admission Testing from 2/19/2025 in [facility]   Non-independent functional status (problems with dressing, bathing, personal grooming, or cooking) 0 filed at 02/19/2025 1457   History of diabetes mellitus  0 filed at 02/19/2025 1457   History of COPD 0 filed at 02/19/2025 1457   History of CHF No filed at 02/19/2025 1457   History of MI 0 filed at 02/19/2025 1457   History of Percutaneous Coronary Intervention, Cardiac Surgery, or Angina No filed at 02/19/2025 1457   Hypertension requiring the use of medication  0.0909 filed at 02/19/2025 1457   Peripheral vascular disease 0 filed at 02/19/2025 1457   Impaired sensorium (cognitive impairement or loss, clouding, or delirium) 0 filed at 02/19/2025 1457   TIA or CVA withouy residual deficit 0 filed at 02/19/2025 1457   Cerebrovascular accident with deficit 0 filed at 02/19/2025 1457   Modified Frailty Index Calculator .0909 filed at 02/19/2025 1457          CHADS2 Stroke Risk  Current as of 22 minutes ago        N/A 3 to 100%: High Risk   2 to < 3%: Medium Risk   0 to < 2%: Low Risk     Last Change: N/A          This score determines the patient's risk of having a stroke if the patient has atrial fibrillation.        This score is not applicable to this patient. Components are not calculated.          Revised Cardiac Risk Index      Flowsheet Row Pre-Admission Testing from 2/19/2025 in [facility]   High-Risk Surgery (Intraperitoneal, Intrathoracic,Suprainguinal vascular) 0 filed at 02/19/2025 1457   History of ischemic heart disease (History of MI, History of positive exercuse test, Current chest paint considered due to myocardial ischemia, Use of nitrate therapy, ECG with pathological Q Waves) 0 filed at 02/19/2025 1457   History of congestive heart failure (pulmonary edemia, bilateral rales or S3 gallop, Paroxysmal nocturnal dyspnea, CXR  showing pulmonary vascular redistribution) 0 filed at 02/19/2025 1457   History of cerebrovascular disease (Prior TIA or stroke) 0 filed at 02/19/2025 1457   Pre-operative insulin treatment 0 filed at 02/19/2025 1457   Pre-operative creatinine>2 mg/dl 0 filed at 02/19/2025 1457   Revised Cardiac Risk Calculator 0 filed at 02/19/2025 1457          Apfel Simplified Score    No data to display       Risk Analysis Index Results This Encounter    No data found in the last 10 encounters.       Stop Bang Score      Flowsheet Row Pre-Admission Testing from 2/19/2025 in [facility]   Do you snore loudly? 0 filed at 02/19/2025 1350   Do you often feel tired or fatigued after your sleep? 0 filed at 02/19/2025 1350   Has anyone ever observed you stop breathing in your sleep? 0 filed at 02/19/2025 1350   Do you have or are you being treated for high blood pressure? 1 filed at 02/19/2025 1350   Recent BMI (Calculated) 32.2 filed at 02/19/2025 1350   Is BMI greater than 35 kg/m2? 0=No filed at 02/19/2025 1350   Age older than 50 years old? 1=Yes filed at 02/19/2025 1350   Is your neck circumference greater than 17 inches (Male) or 16 inches (Female)? 1 filed at 02/19/2025 1350   Gender - Male 0=No filed at 02/19/2025 1350   STOP-BANG Total Score 3 filed at 02/19/2025 1350          Prodigy: High Risk  Total Score: 12              Prodigy Age Score           ARISCAT Score for Postoperative Pulmonary Complications      Flowsheet Row Pre-Admission Testing from 2/19/2025 in [facility]   Age Calculated Score 3 filed at 02/19/2025 1457   Preoperative SpO2 0 filed at 02/19/2025 1457   Respiratory infection in the last month Either upper or lower (i.e., URI, bronchitis, pneumonia), with fever and antibiotic treatment 0 filed at 02/19/2025 1457   Preoperative anemia (Hgb less than 10 g/dl) 0 filed at 02/19/2025 1457   Surgical incision  0 filed at 02/19/2025 1457   Duration of surgery  16 filed at 02/19/2025  1457   Emergency Procedure  0 filed at 02/19/2025 1457   ARISCAT Total Score  19 filed at 02/19/2025 1457          Monika Perioperative Risk for Myocardial Infarction or Cardiac Arrest (JORDAN)      Flowsheet Row [facility]-Admission Testing [facility]19/[facility]   Calculated Age Score 1.48 filed at 02/19/2025 1457   Functional Status  0 filed at 02/19/2025 1457   ASA Class  -3.29 filed at 02/19/2025 1457   Creatinine 0 filed at 02/19/2025 1457   Type of Procedure  0.80 filed at 02/19/2025 1457   JORDAN Total Score  -6.26 filed at 02/19/2025 1457   JORDAN % 0.19 filed at 02/19/2025 1457          Assessment & Plan:    Neuro:  No diagnosis or significant findings on chart review or clinical presentation and evaluation.     HEENT/Airway:  No diagnosis or significant findings on chart review or clinical presentation and evaluation.   STOP-BANG Score-3 points moderate risk for NAZ    Mallampati::  III    TM distance::  >3 FB    Neck ROM::  Full  Dentures-denies  Crowns-reports x 1  Implants-denies    Cardiovascular:  No significant findings on chart review or clinical presentation and evaluation.   History of Hypertension-Managed with Amlodipine and hydrochlorothiazide. BP in PAT was 169/88. Patient was encouraged to self monitor her BP at home and report results to their PCP.  History of DVT/PE-Patient reports DVT post op 7 years ago. States she was briefly treated with OAC which was DC. No reoccurrence.     METS: 5  RCRI: 0 points, 3.9%  risk for postoperative MACE   JORDAN: 0.19% risk for postoperative MACE  EKG -normal sinus rhythm and left bundle branch block Rate-83 No acute changes. Unchanged from previous EKG.     Pulmonary:  No diagnosis or significant findings on chart review or clinical presentation and evaluation.   ARISCAT: <26 points, 1.6% risk of in-hospital postoperative pulmonary complication  PRODIGY: Moderate risk for opioid induced respiratory depression  Smoking History-She has never  smoked.  Discussed smoking cessation and deep breathing handout given    Renal/Urinary:  No diagnosis or significant findings on chart review or clinical presentation and evaluation, however, the patient is at increased risk of perioperative renal complications secondary to age>/= 56 and HTN. Preventative measures include BP monitoring, medication compliance, and hydration management.   CMP-Reviewed, stable  Creatinine-0.52  GFR-    Endocrine:  No diagnosis or significant findings on chart review or clinical presentation and evaluation.   LBG8F-1.2%    Hematologic/Immunology:  No diagnosis or significant findings on chart review or clinical presentation and evaluation.  The patient is not a Jehovah’s witness and will accept blood and blood products if medically indicated.   History of previous blood transfusions No  CBC-Reviewed, stable  HGB-14.8  Caprini Score 13, patient at High for postoperative DVT. Pt supplied education/VTE handout  Anticoagulation use: No     Gastrointestinal:   No diagnosis or significant findings on chart review or clinical presentation and evaluation.   Recreational drug use: none  Alcohol use none    Infectious disease:   No diagnosis or significant findings on chart review or clinical presentation and evaluation.   Prescription provided for CHG body wash and dental rinse. CHG use instructions reviewed and provided to patient.  Staph screen collected-Negative    Musculoskeletal:   No significant findings on chart review or clinical presentation and evaluation.   Positive for Obesity-BMI 32.22  JHFRAT score-11 points. moderate risk for falls    Anesthesia:  ASA 2 - Patient with mild systemic disease with no functional limitations  Anticipated anesthesia-spinal  History of General anesthesia- yes  Complications- No anesthesia complications  No family history of anesthesia complications    Abnormalities noted on PAT evaluation: No    Labs & Imaging ordered:  CBC, CMP, HBA1C, MRSA,  EKG  Nickel/metal allergy-negative  Shellfish allergy-positive    Overall, patient Low Risk for the scheduled Moderate Risk surgery. Discussed with patient medication instructions, NPO guidelines, and any questions or concerns.     Face to face time spent 45 minutes

## 2025-02-19 NOTE — PREPROCEDURE INSTRUCTIONS
Medication List            Accurate as of February 19, 2025  2:13 PM. Always use your most recent med list.                amLODIPine 10 mg tablet  Commonly known as: Norvasc  Take 1 tablet (10 mg) by mouth once daily.  Medication Adjustments for Surgery: Take/Use as prescribed     * chlorhexidine 4 % external liquid  Commonly known as: Hibiclens  Apply topically once daily for 5 days.  Medication Adjustments for Surgery: Take/Use as prescribed     * chlorhexidine 0.12 % solution  Commonly known as: Peridex  Use 15 mL in the mouth or throat once daily for 2 doses. 15 ML night before surgery and 15 ML morning of surgery. Swish and spit  Medication Adjustments for Surgery: Take/Use as prescribed     diazePAM 2 mg tablet  Commonly known as: Valium  Take 1 tablet (2 mg) by mouth every 8 hours if needed for anxiety.  Medication Adjustments for Surgery: Take/Use as prescribed     hydroCHLOROthiazide 25 mg tablet  Commonly known as: HYDRODiuril  Take 1 tablet (25 mg) by mouth once daily.  Medication Adjustments for Surgery: Take/Use as prescribed     ibuprofen 200 mg tablet  Additional Medication Adjustments for Surgery: Take last dose 7 days before surgery  Notes to patient: Last dose preoperatively 3/4/2025     meclizine 25 mg tablet  Commonly known as: Antivert  Take 1 tablet (25 mg) by mouth every 8 hours if needed for dizziness (may cause sleepiness) for up to 21 doses.  Medication Adjustments for Surgery: Take/Use as prescribed     VITAMIN B-12 ORAL  Additional Medication Adjustments for Surgery: Take last dose 7 days before surgery  Notes to patient: Last dose preoperatively 3/4/2025           * This list has 2 medication(s) that are the same as other medications prescribed for you. Read the directions carefully, and ask your doctor or other care provider to review them with you.                NPO Instructions:     Do not eat any food after midnight the night before your surgery/procedure.  You may have clear  liquids until TWO hours before surgery/procedure. This includes water, black tea/coffee, (no milk or cream) apple juice and electrolyte drinks (Gatorade).  You may chew gum up to TWO hours before your surgery/procedure.     Additional Instructions:      Seven/Six Days before Surgery:  Review your medication instructions, stop indicated medications  Five Days before Surgery:  Review your medication instructions, stop indicated medications  Begin using your Hibiclens  Three Days before Surgery:  Review your medication instructions, stop indicated medications  The Day before Surgery:  Start using 0.12% CHG mouthwash  No smoking or alcohol use 24 hours before surgery  Review your medication instructions, stop indicated medications  You will be contacted regarding the time of your arrival to facility and surgery time  Do not eat any food after Midnight  Day of Surgery:  Review your medication instructions, take indicated medications  If you have diabetes, please check your fasting blood sugar upon awakening.  If fasting blood sugar is <80 mg/dl, drink 100 ml of apple juice, time limit of 2 hours before  You may have clear liquids until TWO hours before surgery/procedure.  This includes water, black tea/coffee, (no milk or cream) apple juice and electrolyte drinks (Gatorade)  You may chew gum up to TWO hours before your surgery/procedure  Wear  comfortable loose fitting clothing  Do not use moisturizers, creams, lotions or perfume  All jewelry and valuables should be left at home     CONTACT SURGEON'S OFFICE IF YOU DEVELOP:  * Fever = 100.4 F   * New respiratory symptoms (e.g. cough, shortness of breath, respiratory distress, sore throat)  * Recent loss of taste or smell  *Flu like symptoms such as headache, fatigue or gastrointestinal symptoms  * You develop any open sores, shingles, burning or painful urination   AND/OR:  * You no longer wish to have the surgery.  * Any other personal circumstances change that may lead  to the need to cancel or defer this surgery.  *You were admitted to any hospital within one week of your planned procedure.     SMOKING:  *Quitting smoking can make a huge difference to your health and recovery from surgery.    *If you need help with quitting, call 8-991-QUIT-NOW.     THE DAY BEFORE SURGERY:  *Do not eat any food after midnight the night before your surgery.   *You may have up to TEN OUNCES of clear liquids until TWO hours before your instructed ARRIVAL TIME to hospital. This includes water, black tea/coffee, (no milk or cream) apple juice, clear broth and electrolyte drinks (Gatorade). Please avoid clear liquids that are red in color.   *You may chew gum/mints up to TWO hours before your surgery/procedure.     SURGICAL TIME:  *You will be contacted between 2 p.m. and 3 p.m. the business day before your surgery with your arrival time.  *If you haven't received a call by 3pm, call [phone]  *Scheduled surgery times may change and you will be notified if this occurs-check your personal voicemail for any updates.     ON THE MORNING OF SURGERY:  *Wear comfortable, loose fitting clothing.   *Do not use moisturizers, creams, lotions or perfume.  *All jewelry and valuables should be left at home.  *Prosthetic devices such as contact lenses, hearing aids, dentures, eyelash extensions, hairpins and body piercing must be removed before surgery.     BRING WITH YOU:  *Photo ID and insurance card  *Current list of medications and allergies  *Pacemaker/Defibrillator/Heart stent cards  *CPAP machine and mask  *Slings/splints/crutches  *Copy of your complete Advanced Directive/DHPOA-if applicable  *Neurostimulator implant remote     PARKING AND ARRIVAL:  *Check in at the Main Entrance desk and let them know you are here for surgery.     IF YOU ARE HAVING OUTPATIENT/SAME DAY SURGERY:  *A responsible adult MUST accompany you at the time of discharge and stay with you for 24 hours after your surgery.  *You may  NOT drive yourself home after surgery.  *You may use a taxi or ride sharing service ([employer], Uber) to return home ONLY if you are accompanied by a friend or family member.  *Instructions for resuming your medications will be provided by your surgeon.     Thank you for coming to Pre Admission testing.      If I have prescribed medication please don't forget to  at your pharmacy.      Any questions about today's visit call [phone] and leave a message in the general mailbox.     Patient instructed to ambulate as soon as possible postoperatively to decrease thromboembolic risk.     Tima Barkley, APRN-CNP     Thank you for visiting the [facility].  If you have any changes to your health condition, please call the surgeons office to alert them and give them details of your symptoms.        Preoperative Fasting Guidelines     Why must I stop eating and drinking near surgery time?  With sedation, food or liquid in your stomach can enter your lungs causing serious complications  Increases nausea and vomiting     When do I need to stop eating and drinking before my surgery?  Do not eat any food after midnight the night before your surgery/procedure.  You may have up to TEN ounces of clear liquid until TWO hours before your instructed arrival time to the hospital.  This includes water, black tea/coffee, (no milk or cream) apple juice, and electrolyte drinks (Gatorade)  You may chew gum until TWO hours before your surgery/procedure        Additional Instructions:      The Day before Surgery:  -Review your medication instructions, stop indicated medications  -You will be contacted in the evening regarding the time of your arrival to facility and surgery time     Day of Surgery:  -Review your medication instructions, take indicated medications  -Wear comfortable loose fitting clothing  -Do not use moisturizers, creams, lotions or perfume  -All jewelry and valuables should be left at home                    Preoperative Brain Exercises     What are brain exercises?  A brain exercise is any activity that engages your thinking (cognitive) skills.     What types of activities are considered brain exercises?  Jigsaw puzzles, crossword puzzles, word jumble, memory games, word search, and many more.  Many can be found free online or on your phone via a mobile mery.     Why should I do brain exercises before my surgery?  More recent research has shown brain exercise before surgery can lower the risk of postoperative delirium (confusion) which can be especially important for older adults.  Patients who did brain exercises for 5 to 10 minutes/day in the days before surgery, cut their risk of postoperative delirium in half up to 1 week after surgery.                         [facility]     Preoperative Deep Breathing Exercises     Why it is important to do deep breathing exercises before my surgery?  Deep breathing exercises strengthen your breathing muscles.  This helps you to recover after your surgery and decreases the chance of breathing complications.        How are the deep breathing exercises done?  Sit straight with your back supported.  Breathe in deeply and slowly through your nose. Your lower rib cage should expand and your abdomen may move forward.  Hold that breath for 3 to 5 seconds.  Breathe out through pursed lips, slowly and completely.  Rest and repeat 10 times every hour while awake.  Rest longer if you become dizzy or lightheaded.                      [facility]     Preoperative Deep Breathing Exercises     Why it is important to do deep breathing exercises before my surgery?  Deep breathing exercises strengthen your breathing muscles.  This helps you to recover after your surgery and decreases the chance of breathing complications.        How are the deep breathing exercises done?  Sit straight with your back supported.  Breathe in deeply and  slowly through your nose. Your lower rib cage should expand and your abdomen may move forward.  Hold that breath for 3 to 5 seconds.  Breathe out through pursed lips, slowly and completely.  Rest and repeat 10 times every hour while awake.  Rest longer if you become dizzy or lightheaded.        Patient Information: Incentive Spirometer  What is an incentive spirometer?  An incentive spirometer is a device used before and after surgery to “exercise” your lungs.  It helps you to take deeper breaths to expand your lungs.  Below is an example of a basic incentive spirometer.  The device you receive may differ slightly but they all function the same.    Why do I need to use an incentive spirometer?  Using your incentive spirometer prepares your lungs for surgery and helps prevent lung problems after surgery.  How do I use my incentive spirometer?  When you're using your incentive spirometer, make sure to breathe through your mouth. If you breathe through your nose, the incentive spirometer won't work properly. You can hold your nose if you have trouble.  If you feel dizzy at any time, stop and rest. Try again at a later time.  Follow the steps below:  Set up your incentive spirometer, expand the flexible tubing and connect to the outlet.  Sit upright in a chair or bed. Hold the incentive spirometer at eye level.   Put the mouthpiece in your mouth and close your lips tightly around it. Slowly breathe out (exhale) completely.  Breathe in (inhale) slowly through your mouth as deeply as you can. As you take a breath, you will see the piston rise inside the large column. While the piston rises, the indicator should move upwards. It should stay in between the 2 arrows (see Figure).  Try to get the piston as high as you can, while keeping the indicator between the arrows.   If the indicator doesn't stay between the arrows, you're breathing either too fast or too slow.  When you get it as high as you can, hold your breath for 10  seconds, or as long as possible. While you're holding your breath, the piston will slowly fall to the base of the spirometer.  Once the piston reaches the bottom of the spirometer, breathe out slowly through your mouth. Rest for a few seconds.  Repeat 10 times. Try to get the piston to the same level with each breath.  Repeat every hour while awake  You can carefully clean the outside of the mouthpiece with an alcohol wipe or soap and water.       Patient and Family Education             Ways You Can Help Prevent Blood Clots                    This handout explains some simple things you can do to help prevent blood clots.      Blood clots are blockages that can form in the body's veins. When a blood clot forms in your deep veins, it may be called a deep vein thrombosis, or DVT for short. Blood clots can happen in any part of the body where blood flows, but they are most common in the arms and legs. If a piece of a blood clot breaks free and travels to the lungs, it is called a pulmonary embolus (PE). A PE can be a very serious problem.         Being in the hospital or having surgery can raise your chances of getting a blood clot because you may not be well enough to move around as much as you normally do.         Ways you can help prevent blood clots in the hospital           Wearing SCDs. SCDs stands for Sequential Compression Devices.   SCDs are special sleeves that wrap around your legs  They attach to a pump that fills them with air to gently squeeze your legs every few minutes.   This helps return the blood in your legs to your heart.   SCDs should only be taken off when walking or bathing.   SCDs may not be comfortable, but they can help save your life.                                            Wearing compression stockings - if your doctor orders them. These special snug fitting stockings gently squeeze your legs to help blood flow.       Walking. Walking helps move the blood in your legs.   If your doctor  says it is ok, try walking the halls at least   5 times a day. Ask us to help you get up, so you don't fall.      Taking any blood thinning medicines your doctor orders.        Page 1 of 2            [facility]; 3/23   Ways you can help prevent blood clots at home         Wearing compression stockings - if your doctor orders them. ? Walking - to help move the blood in your legs.       Taking any blood thinning medicines your doctor orders.      Signs of a blood clot or PE        Tell your doctor or nurse know right away if you have of the problems listed below.    If you are at home, seek medical care right away. Call 911 for chest pain or problems breathing.                Signs of a blood clot (DVT) - such as pain,  swelling, redness or warmth in your arm or leg      Signs of a pulmonary embolism (PE) - such as chest pain or feeling short of breath    Patient Information: Pre-Operative Infection Prevention Measures     Why did I have my nose, under my arms, and groin swabbed?  The purpose of the swab is to identify Staphylococcus aureus inside your nose or on your skin.  The swab was sent to the laboratory for culture.  A positive swab/culture for Staphylococcus aureus is called colonization or carriage.      What is Staphylococcus aureus?  Staphylococcus aureus, also known as “staph”, is a germ found on the skin or in the nose of healthy people.  Sometimes Staphylococcus aureus can get into the body and cause an infection.  This can be minor (such as pimples, boils, or other skin problems).  It might also be serious (such as a blood infection, pneumonia, or a surgical site infection).    What is Staphylococcus aureus colonization or carriage?  Colonization or carriage means that a person has the germ but is not sick from it.  These bacteria can be spread on the hands or when breathing or sneezing.    How is Staphylococcus aureus spread?  It is most often spread by close contact with a person or item  that carries it.    What happens if my culture is positive for Staphylococcus aureus?  Your doctor/medical team will use this information to guide any antibiotic treatment which may be necessary.  Regardless of the culture results, we will clean the inside of your nose with a betadine swab just before you have your surgery.      Will I get an infection if I have Staphylococcus aureus in my nose or on my skin?  Anyone can get an infection with Staphylococcus aureus.  However, the best way to reduce your risk of infection is to follow the instructions provided to you for the use of your CHG soap and dental rinse.        Patient Information: Oral/Dental Rinse    What is oral/dental rinse?   It is a mouthwash. It is a way of cleaning the mouth with a germ-killing solution before your surgery.  The solution contains chlorhexidine, commonly known as CHG.   It is used inside the mouth to kill a bacteria known as Staphylococcus aureus.  Let your doctor know if you are allergic to Chlorhexidine.    We have sent a prescription for CHG 0.12% mouthwash to your preferred pharmacy.  If you have not already, Please  your prescription and start using the day before before surgery.  Follow the instruction sheet provided to you at your CPM/PAT appointment. Please contact [facility] if you do not receive your CHG mouthwash prescription.     Why do I need to use CHG oral/dental rinse?  The CHG oral/dental rinse helps to kill a bacteria in your mouth known as Staphylococcus aureus.     This reduces the risk of infection at the surgical site.      Using your CHG oral/dental rinse  STEPS:  Use your CHG oral/dental rinse after you brush your teeth the night before (at bedtime) and the morning of your surgery.  Follow all directions on your prescription label.    Use the cap on the container to measure 15ml   Swish (gargle if you can) the mouthwash in your mouth for at least 30 seconds, (do not swallow) and spit out  After you use your  CHG rinse, do not rinse your mouth with water, drink or eat.  Please refer to the prescription label for the appropriate time to resume oral intake      What side effects might I have using the CHG oral/dental rinse?  CHG rinse will stick to plaque on the teeth.  Brush and floss just before use.  Teeth brushing will help avoid staining of plaque during use.      Patient Information: Home Preoperative Antibacterial Shower      What is a home preoperative antibacterial shower?  This shower is a way of cleaning the skin with a germ-killing solution before surgery.  The solution contains chlorhexidine, commonly known as CHG.  CHG is a skin cleanser with germ-killing ability.  Let your doctor know if you are allergic to chlorhexidine.    Why do I need to take a preoperative antibacterial shower?  Skin is not sterile.  It is best to try to make your skin as free of germs as possible before surgery.  Proper cleansing with a germ-killing soap before surgery can lower the number of germs on your skin.  This helps to reduce the risk of infection at the surgical site.  Following the instructions listed below will help you prepare your skin for surgery.      How do I use the solution?  Steps:  Begin using your CHG soap 5 days before your scheduled surgery on 3/7/2025.    First, wash and rinse your hair using the CHG soap. Keep CHG soap away from ear canals and eyes.  Rinse completely, do not condition.  Hair extensions should be removed.  Wash your face with your normal soap and rinse.    Apply the CHG solution to a clean wet washcloth.  Turn the water off or move away from the water spray to avoid premature rinsing of the CHG soap as you are applying.   Firmly lather your entire body from the neck down.  Do not use on your face.  Pay special attention to the area(s) where your incision(s) will be located unless they are on your face.  Avoid scrubbing your skin too hard.  The important point is to have the CHG soap sit on your  skin for 3 minutes.    When the 3 minutes are up, turn on the water and rinse the CHG solution off your body completely.   DO NOT wash with regular soap after you have used the CHG soap solution  Pat yourself dry with a clean, freshly-laundered towel.  DO NOT apply powders, deodorants, or lotions.  Dress in clean, freshly laundered nightclothes.    Be sure to sleep with clean, freshly laundered sheets.  Be aware that CHG will cause stains on fabrics; if you wash them with bleach after use.  Rinse your washcloth and other linens that have contact with CHG completely.  Use only non-chlorine detergents to launder the items used.   The morning of surgery is the fifth day.  Repeat the above steps and dress in clean comfortable clothing     Whom should I contact if I have any questions regarding the use of CHG soap?  Call the [facility] at [phone] if you have any questions.

## 2025-02-20 ENCOUNTER — APPOINTMENT (OUTPATIENT)
Dept: PRIMARY CARE | Facility: CLINIC | Age: 75
End: 2025-02-20
Payer: MEDICARE

## 2025-02-20 LAB
ATRIAL RATE: 83 BPM
P AXIS: 37 DEGREES
P OFFSET: 191 MS
P ONSET: 130 MS
PR INTERVAL: 166 MS
Q ONSET: 213 MS
QRS COUNT: 14 BEATS
QRS DURATION: 148 MS
QT INTERVAL: 442 MS
QTC CALCULATION(BAZETT): 519 MS
QTC FREDERICIA: 492 MS
R AXIS: 72 DEGREES
T AXIS: 18 DEGREES
T OFFSET: 434 MS
VENTRICULAR RATE: 83 BPM

## 2025-02-21 DIAGNOSIS — Z01.818 PREOP TESTING: Primary | ICD-10-CM

## 2025-02-21 LAB — STAPHYLOCOCCUS SPEC CULT: NORMAL

## 2025-02-24 ENCOUNTER — APPOINTMENT (OUTPATIENT)
Dept: PRIMARY CARE | Facility: CLINIC | Age: 75
End: 2025-02-24
Payer: MEDICARE

## 2025-02-28 ENCOUNTER — OFFICE VISIT (OUTPATIENT)
Dept: ORTHOPEDIC SURGERY | Facility: CLINIC | Age: 75
End: 2025-02-28
Payer: MEDICARE

## 2025-02-28 ENCOUNTER — APPOINTMENT (OUTPATIENT)
Dept: ORTHOPEDIC SURGERY | Facility: CLINIC | Age: 75
End: 2025-02-28
Payer: MEDICARE

## 2025-02-28 DIAGNOSIS — G89.29 CHRONIC PAIN OF LEFT KNEE: Primary | ICD-10-CM

## 2025-02-28 DIAGNOSIS — M25.562 CHRONIC PAIN OF LEFT KNEE: Primary | ICD-10-CM

## 2025-02-28 PROCEDURE — 99214 OFFICE O/P EST MOD 30 MIN: CPT | Performed by: STUDENT IN AN ORGANIZED HEALTH CARE EDUCATION/TRAINING PROGRAM

## 2025-02-28 NOTE — PROGRESS NOTES
"DEVEN/TKA Related Summary           L hip: N  L knee: N  R hip: N  R knee  1/7/2017: Primary TKA (Dr. Dr. Rosales at [facility]).  Doing well  Lumbar surgery or significant pathology: N            RADHA Jeffries is a 74 y.o. female presenting for preop evaluation for L primary TKA. Full details in previous note. Preop clearance has been or will be obtained prior to the surgical date.    Living/Discharge Arrangements  Preferred pharmacy: [facility]  Planned same day DC: N  Assistance level: independent  Cohabitants: spouse  Planned care partner: spouse  Anticipated DC location: home            HISTORIES            Since last visit, patient reports no major changes in health, substance use, or baseline medications.            OBJECTIVE            Physical Exam  No changes compared to previously documented orthopedic exam.  Skin at intended incision free of scrapes/bites/lesions/rashes .    Imaging  No new imaging today.            ASSESSMENT & PLAN           Patient verbalized understanding of below A&P. All questions answered.  L knee DJD  Logistics (day of surgery timing, etc) covered in Preop Joints Class.  Planned incision site discussed. Instructed to call if scrapes/bites/lesions/rashes develop on operative extremity.  Proceed with surgery  Surgical risks discussion: See consent.  R Primary TKA (Dr. Rosales, 2017)  Doing well  Follow-up: Interval XR in 5yrs (2029)  PMH, PSH, other considerations discussed  BMI<40 but on spectrum of increased risk of surgical complications.  Hx DVT/PE (\"very small\" clot in calf following previous surgery)  PCN allergy (hives)  [employer]  Daughter of my existing pt, Madalyn Mosheroli.  Friends of my existing pts Benita Gordon  Occupation: Homemaker  Hobbies: Gardening, bird watching  PCP: Tico Negro MD  "

## 2025-03-05 ENCOUNTER — APPOINTMENT (OUTPATIENT)
Dept: CARDIOLOGY | Facility: CLINIC | Age: 75
End: 2025-03-05
Payer: MEDICARE

## 2025-03-05 VITALS
SYSTOLIC BLOOD PRESSURE: 148 MMHG | HEART RATE: 78 BPM | OXYGEN SATURATION: 95 % | HEIGHT: 63 IN | DIASTOLIC BLOOD PRESSURE: 86 MMHG | WEIGHT: 179 LBS | BODY MASS INDEX: 31.71 KG/M2

## 2025-03-05 DIAGNOSIS — Z01.818 PREOP TESTING: ICD-10-CM

## 2025-03-05 DIAGNOSIS — I44.7 LBBB (LEFT BUNDLE BRANCH BLOCK): ICD-10-CM

## 2025-03-05 DIAGNOSIS — I51.7 MILD LEFT VENTRICULAR HYPERTROPHY: ICD-10-CM

## 2025-03-05 DIAGNOSIS — I10 PRIMARY HYPERTENSION: Primary | ICD-10-CM

## 2025-03-05 PROCEDURE — G2211 COMPLEX E/M VISIT ADD ON: HCPCS

## 2025-03-05 PROCEDURE — 3077F SYST BP >= 140 MM HG: CPT

## 2025-03-05 PROCEDURE — 1036F TOBACCO NON-USER: CPT

## 2025-03-05 PROCEDURE — 3008F BODY MASS INDEX DOCD: CPT

## 2025-03-05 PROCEDURE — 99204 OFFICE O/P NEW MOD 45 MIN: CPT

## 2025-03-05 PROCEDURE — 3079F DIAST BP 80-89 MM HG: CPT

## 2025-03-05 PROCEDURE — 1159F MED LIST DOCD IN RCRD: CPT

## 2025-03-05 RX ORDER — CLINDAMYCIN HYDROCHLORIDE 300 MG/1
300 CAPSULE ORAL 3 TIMES DAILY
COMMUNITY
Start: 2024-12-10

## 2025-03-05 RX ORDER — CHLORHEXIDINE GLUCONATE ORAL RINSE 1.2 MG/ML
15 SOLUTION DENTAL AS NEEDED
COMMUNITY
Start: 2025-02-19

## 2025-03-05 RX ORDER — CHLORHEXIDINE GLUCONATE 4 G/100ML
LIQUID TOPICAL DAILY PRN
COMMUNITY
Start: 2025-02-19

## 2025-03-05 NOTE — PATIENT INSTRUCTIONS
- No cardiovascular contraindications to proceed with surgery, and no more testing is needed prior to surgery.  - Low sodium diet  - Check BP at home  - Echocardiogram  - Patient will follow up with me in the Cardiology office once the results are available

## 2025-03-05 NOTE — PROGRESS NOTES
Location of visit: [address]   Type of Visit: New    Chief Complaint:  Patient was referred to Cardiology by Dr. Barkley for No chief complaint on file..    History Of Present Illness:    Mitra Jeffries is a 74 y.o. female, with history significant for hypertension, obesity (BMI 32.2), and prior DVT/PE, who visits Cardiology today as a new patient  for Preop evaluation.    74-year-old female with a history of hypertension, obesity (BMI 32.2), and prior DVT/PE presents for cardiology evaluation in preparation for elective left total knee arthroplasty. She denies cardiovascular symptoms, including chest pain, dyspnea on exertion, palpitations, or syncope. BP in preoperative assessment was elevated at 169/88, managed with amlodipine and hydrochlorothiazide, with instructions to monitor at home. No history of ischemic heart disease, heart failure, or arrhythmias. EKG shows normal sinus rhythm with a left bundle branch block, unchanged from prior studies. Functional capacity is estimated at 5 METs. Revised Cardiac Risk Index (RCRI) is 0, indicating a low risk for major adverse cardiac events (MACE). JORDAN score estimates a 0.19% risk for perioperative cardiac events. No significant renal impairment (Cr 0.52, GFR within normal limits). Given her history of DVT/PE, she is at high risk for postoperative venous thromboembolism (Caprini Score 13). Overall, she presents with a low perioperative cardiac risk, and no additional cardiovascular testing is warranted before surgery. Blood pressure control optimization is recommended, and close postoperative monitoring for VTE prevention is advised.  Echocardiogram April 2014: normal left ventricular systolic function with mild concentric left ventricular hypertrophy     Patient is sent for preop clearance in the context of a left bundle branch block.  She states she has normal functional capacity, she is very active, she uses medication for blood pressure, and states she is  very compliant.  She eats with salt.  She denies chest pain, chest pressure,  shortness of breath, orthopnea, nocturia, edema, palpitations, dizziness, lightheadedness, syncope.  She does not snore.  She has an echocardiogram from 2014 that showed mild concentric left ventricular hypertrophy.  She has an EKG from November last year with left bundle branch block.    Blood pressure today: 148/66 mmHg      Past Medical History:  She has a past medical history of Dermatitis (2023), Diverticulosis of intestine, part unspecified, without perforation or abscess without bleeding (03/10/2014), Personal history of (healed) traumatic fracture (03/10/2014), Personal history of other diseases of the nervous system and sense organs (03/10/2014), Personal history of other venous thrombosis and embolism (2019), Personal history of pulmonary embolism (2020), Personal history of urinary (tract) infections (03/10/2014), Serous otitis media (2023), and Sinusitis (2023).    Past Surgical History:  She has a past surgical history that includes  section, classic (2013); Cholecystectomy (2013); Appendectomy (03/10/2014); Tonsillectomy (03/10/2014); Total knee arthroplasty (2018); Other surgical history (2016); and Other surgical history (2016).    Social History:  She reports that she has quit smoking. Her smoking use included cigarettes. She has never been exposed to tobacco smoke. She has never used smokeless tobacco. She reports that she does not use drugs. No history on file for alcohol use.    Family History:  No family history on file.  Allergies:  Codeine, Penicillins, Shellfish containing products, and Sulfa (sulfonamide antibiotics)    Outpatient Medications:  Current Outpatient Medications   Medication Instructions    amLODIPine (NORVASC) 10 mg, oral, Daily    cyanocobalamin, vitamin B-12, (VITAMIN B-12 ORAL) Daily PRN    diazePAM (VALIUM) 2 mg, oral, Every  "8 hours PRN    hydroCHLOROthiazide (HYDRODIURIL) 25 mg, oral, Daily    ibuprofen 200 mg tablet 2 times daily PRN    meclizine (ANTIVERT) 25 mg, oral, Every 8 hours PRN     Last Recorded Vitals:  There were no vitals filed for this visit.    Physical Exam:      2/19/2025     1:46 PM 12/20/2024    11:16 AM 12/19/2024    10:23 AM 11/19/2024     7:48 AM 7/12/2024    11:40 AM 3/15/2024    10:58 AM   Vitals   Systolic 169  166 177 136 136   Diastolic 88  82 94 82 84   BP Location   Right arm Left arm     Heart Rate 77  77 92 72 68   Temp 36.6 °C (97.9 °F)  36.4 °C (97.5 °F) 36.7 °C (98 °F)     Resp 16  20 18 16 16   Height 1.588 m (5' 2.5\") 1.575 m (5' 2\")  1.575 m (5' 2\")     Weight (lb) 179 191 188 188  190   BMI 32.22 kg/m2 34.93 kg/m2 34.39 kg/m2 34.39 kg/m2  33.66 kg/m2   BSA (m2) 1.89 m2 1.95 m2 1.93 m2 1.93 m2  1.96 m2   Visit Report  Report Report  Report Report     Wt Readings from Last 5 Encounters:   02/19/25 81.2 kg (179 lb)   12/20/24 86.6 kg (191 lb)   12/19/24 85.3 kg (188 lb)   11/19/24 85.3 kg (188 lb)   03/15/24 86.2 kg (190 lb)       Physical Exam  Vitals reviewed.   HENT:      Head: Normocephalic.      Mouth/Throat:      Pharynx: Oropharynx is clear.   Eyes:      Pupils: Pupils are equal, round, and reactive to light.   Cardiovascular:      Rate and Rhythm: Normal rate.      Pulses: Normal pulses.      Heart sounds: Normal heart sounds.   Pulmonary:      Effort: Pulmonary effort is normal.      Breath sounds: Normal breath sounds.   Abdominal:      General: Abdomen is flat. Bowel sounds are normal.      Palpations: Abdomen is soft.   Musculoskeletal:         General: Normal range of motion.   Skin:     General: Skin is warm and dry.   Neurological:      General: No focal deficit present.      Mental Status: She is alert.   Psychiatric:         Mood and Affect: Mood normal.              Last Labs Reviewed:  CBC -  Recent Labs     02/19/25  1453 12/20/24  1216 11/01/22  1106 08/16/21  1300   WBC 6.8  --  " "6.8 6.1   HGB 14.8 15.1 16.1* 16.5*   HCT 43.9 45.0 48.5* 46.7*     --  336 312   MCV 86  --  93 91     CMP -  Recent Labs     02/19/25  1453 03/15/24  1126 11/01/22  1106 08/16/21  1551    140 141 139   K 4.4 3.8 4.2 3.5    102 102 98   CO2 29 28 29 26   ANIONGAP 17 14 14 19   BUN 17 18 16 14   CREATININE 0.52 0.54 0.61 0.56   EGFR >90 >90  --   --    CALCIUM 10.1 9.8 9.9 9.9     Recent Labs     02/19/25  1453 12/20/24  1216 03/15/24  1126 11/01/22  1106 08/16/21  1551   ALBUMIN 4.6 4.3 4.4 4.3 4.6   ALKPHOS 58  --   --   --   --    ALT 13  --  17 19 19   AST 16  --  14 17 16   BILITOT 0.5  --   --   --   --      LIPID PANEL -   Recent Labs     03/15/24  1126 11/01/22  1106 08/16/21  1551   CHOL 278* 303* 335*   LDLF  --  203* 236*   HDL 48.8 67.0 60.8   TRIG  --  163* 189*     COAGULATION PANEL -  No results for input(s): \"PTT\", \"INR\", \"HAUF\", \"DDIMERVTE\", \"HAPTOGLOBIN\", \"FIBRINOGEN\" in the last 98475 hours.  HEME/ENDO -  Recent Labs     12/20/24  1216 03/15/24  1126 11/01/22  1106 08/16/21  1551   TSH  --  1.92 1.47 1.53   HGBA1C 5.2 5.5  --   --      CARDIOVASCULAR  Recent Labs     08/16/21  1551   CRP 0.40     Last Cardiology/Imaging Tests Personally Reviewed (if images available) and Interpreted:  ECG:  Encounter Date: 02/19/25   ECG 12 Lead   Result Value    Ventricular Rate 83    Atrial Rate 83    MA Interval 166    QRS Duration 148    QT Interval 442    QTC Calculation(Bazett) 519    P Axis 37    R Axis 72    T Axis 18    QRS Count 14    Q Onset 213    P Onset 130    P Offset 191    T Offset 434    QTC Fredericia 492    Narrative    Normal sinus rhythm  Left bundle branch block  Abnormal ECG  When compared with ECG of 19-NOV-2024 07:50, (unconfirmed)  T wave inversion now evident in Inferior leads  Confirmed by Fausto Fong ([id number]) on 2/20/2025 11:29:17 AM   ECG 12 Lead 02/19/2025    Echocardiogram:  No results found for this or any previous visit from the past 1825 days.  No results " found for this or any previous visit from the past 1095 days.    Cath:  No results found for this or any previous visit from the past 1825 days.  No results found for this or any previous visit from the past 3650 days.  No results found for this or any previous visit from the past 1095 days.    Stress Test:  No results found for this or any previous visit from the past 1825 days.  No results found for this or any previous visit from the past 1095 days.    Cardiac CT/MRI:  No results found for this or any previous visit from the past 1825 days.  No results found for this or any previous visit from the past 1095 days.    Other CT:      CV RISK FACTORS:   # Hypertension: Last BP:  .  # Hyperlipidemia: Last Tchol 278 / LDL No results found for requested labs within last 365 days. / HDL 48.8 / TRIG No results found for requested labs within last 365 days. (3/15/2024: 11:26 AM).  # Type II Diabetes Mellitus: Last A1c 5.2 (12/20/2024: 12:16 PM).  # Obesity: Last BMI:  .  # CKD: Last BUN/Cr (GFR): 17/0.52 (>90), 2/19/2025:  2:53 PM.    ASCV RISK:  The 10-year ASCVD risk score (Indu DK, et al., 2019) is: 32.2%    Values used to calculate the score:      Age: 74 years      Sex: Female      Is Non- : No      Diabetic: No      Tobacco smoker: No      Systolic Blood Pressure: 169 mmHg      Is BP treated: Yes      HDL Cholesterol: 48.8 mg/dL      Total Cholesterol: 278 mg/dL    Assessment/Plan   Mtira Jeffries is a 74 y.o. female, with history significant for hypertension, obesity (BMI 32.2), and prior DVT/PE, who visits Cardiology today as a new patient  for Preop evaluation.    #Preoperative evaluation  #LBBB  #Hypertension  #History of DVT    Assessment and plan  The patient is a symptomatic, with normal functional capacity over 4 METS, and the left bundle branch block is a finding and it was present at the EKG 4 months ago.  In this context there are no cardiovascular contraindications to proceed  with surgery, and no more testing is needed prior to surgery.  I gave recommendations in relation to optimize her blood pressure, cut down her salt consumption, and also recommended to get a blood pressure monitor to assess her blood pressure at home.  To complete the study, and not an urgent issue, we will perform an echocardiogram to assess the LBBB.    Recommendations:  - No cardiovascular contraindications to proceed with surgery, and no more testing is needed prior to surgery.  - Low sodium diet  - Check BP at home  - Echocardiogram  - Patient will follow up with me in the Cardiology office once the results are available  - I spent 45 minutes assessing the case between pre-charting, face-to-face patient interaction, and documentation    Michael Le MD

## 2025-03-07 PROBLEM — Z96.652 HISTORY OF TOTAL KNEE ARTHROPLASTY, LEFT: Status: ACTIVE | Noted: 2025-03-07

## 2025-03-07 RX ORDER — TRAMADOL HYDROCHLORIDE 50 MG/1
50 TABLET ORAL EVERY 6 HOURS PRN
Qty: 28 TABLET | Refills: 0 | Status: SHIPPED | OUTPATIENT
Start: 2025-03-07 | End: 2025-03-19

## 2025-03-07 RX ORDER — ACETAMINOPHEN 500 MG/1
1000 CAPSULE, LIQUID FILLED ORAL EVERY 8 HOURS PRN
Qty: 90 CAPSULE | Refills: 0 | Status: SHIPPED | OUTPATIENT
Start: 2025-03-07 | End: 2025-04-06

## 2025-03-07 RX ORDER — POLYETHYLENE GLYCOL 3350 17 G/17G
17 POWDER, FOR SOLUTION ORAL DAILY
Qty: 510 G | Refills: 0 | Status: SHIPPED | OUTPATIENT
Start: 2025-03-07 | End: 2025-04-11

## 2025-03-07 RX ORDER — DOCUSATE SODIUM 100 MG/1
100 CAPSULE, LIQUID FILLED ORAL 2 TIMES DAILY
Qty: 60 CAPSULE | Refills: 0 | Status: SHIPPED | OUTPATIENT
Start: 2025-03-07 | End: 2025-04-11

## 2025-03-07 RX ORDER — OXYCODONE HYDROCHLORIDE 5 MG/1
5 TABLET ORAL EVERY 6 HOURS PRN
Qty: 28 TABLET | Refills: 0 | Status: SHIPPED | OUTPATIENT
Start: 2025-03-07 | End: 2025-03-19

## 2025-03-07 RX ORDER — PANTOPRAZOLE SODIUM 40 MG/1
40 TABLET, DELAYED RELEASE ORAL
Qty: 42 TABLET | Refills: 0 | Status: SHIPPED | OUTPATIENT
Start: 2025-03-07 | End: 2025-04-23

## 2025-03-07 RX ORDER — CEFADROXIL 500 MG/1
500 CAPSULE ORAL 2 TIMES DAILY
Qty: 14 CAPSULE | Refills: 0 | Status: SHIPPED | OUTPATIENT
Start: 2025-03-07 | End: 2025-03-19

## 2025-03-07 RX ORDER — DEXAMETHASONE SODIUM PHOSPHATE 10 MG/ML
10 INJECTION INTRAMUSCULAR; INTRAVENOUS ONCE
Status: CANCELLED | OUTPATIENT
Start: 2025-03-07 | End: 2025-03-07

## 2025-03-07 RX ORDER — PREGABALIN 75 MG/1
75 CAPSULE ORAL 2 TIMES DAILY
Qty: 28 CAPSULE | Refills: 0 | Status: SHIPPED | OUTPATIENT
Start: 2025-03-07 | End: 2025-03-26

## 2025-03-07 RX ORDER — CELECOXIB 200 MG/1
200 CAPSULE ORAL 2 TIMES DAILY
Qty: 56 CAPSULE | Refills: 0 | Status: SHIPPED | OUTPATIENT
Start: 2025-03-07 | End: 2025-04-06

## 2025-03-07 NOTE — DISCHARGE INSTRUCTIONS
Cristofer Mills MD  Adult Reconstruction and Joint Replacement  Office: [facility] (Phone [phone], Fax [phone], or [employer])      Thank you for choosing Dr. Mills for your joint replacement surgery. Below you will find instructions for after surgery in a variety of topics with answers to frequently asked questions. These instructions apply to most patients, but those with complex surgical or medical issues may vary. Please carefully read them and contact our office with questions.    Communication Methods  MyChart will continue to be the best way for us to communicate, but you may also call the office if you prefer.    Your Team and Whom to Contact  Dr. Mills's team is available to answer your questions. The best person to contact depends on your question and surgery location.  [facility] (contact information above): Scheduling office visits, prescription refills, medical concerns, leave of Absence, FMLA, or other paperwork.  Nursing Coordinators (If your surgery was at [facility]: Tima Gray ([phone]), Carol Gross BSN-BC ([phone])). If your surgery was at [facility]: Jessie Dunaway MBA, RN-BC ([phone])): Nursing or wound care questions within 6 weeks after surgery, prescription for outpatient physical therapy.  Sweetie Dasilva ([phone], [phone]) or Andree Lee ([phone]): Durable medical equipment (walker, braces, elevated toilet seat, etc.)    FMLA, Disability, and Return to Work Forms  Please submit all forms directly to [facility] (contact information above). Do not leave paperwork with the clinic staff.    Home Arrangements  For the first 3-5 days and nights after surgery, you will need a care partner (friend or family) with you at home. Alternatively, you may stay with friends or family. Research has shown that you will recover much better at home than at rehab.    Physical Therapy  All Patients  Physical therapy will begin in your home within  1-2 days after discharge. The care coordinator during your surgical stay should have arranged these services with a company of your choice before you were discharged from the hospital. Until starting home therapy, continue the exercises in your preoperative paperwork.  Depending on your condition, your team may have provided a knee immobilizer (stiff brace that keeps your leg straight). Wear this when ambulating until you feel your leg is strong enough to be safe without it.  Hip Replacements: Additional physical therapy outside your home is not required. In most cases, simply walking and gradually returning to activities is all that is needed. If you would like additional therapy, we are happy to prescribe it.  Knee Replacements: Additional physical therapy outside your home is required and should start 2-3 weeks after surgery.  Call the physical therapy location of your choice the day after you are discharged from the hospital to schedule the appointment well ahead of time and ensure there are no delays in starting outpatient therapy.    Clinic Appointments  2-3 weeks after surgery: If you have any skin stitches or staples, they will likely be removed at this time. This appointment should have been scheduled before your surgery.  6 weeks after surgery: X-ray  1 year after surgery: X-rays  Every 5 years after surgery (5, 10, 15…): X-rays    Restarting Your Home Diet and Medications  Resume your normal diet when you return home. Be sure to drink plenty of water and eat a substantial amount of protein (to help your body heal and rebuild) and fiber (to promote good bowel habits and prevent constipation).  Constipation is common from a combination of inactivity, anesthesia, and pain medication. In addition to the dietary recommendations above, physical activity such as walking also helps stimulate your bowels.   The PAT will advise you on restarting your medications. See your discharge paperwork for more  details.    Goals and Limitations  Being aware of goals and limitations will help you take great care of your new joint. The below guidelines are for the first 3 months after surgery. After that time, please discuss increasing activities with the team.  All Patients  Unless the team tells you otherwise, you may put all of your weight on your new joint.  Activities to avoid: Sharp pivots, deep squats, jolting impact on your joint (running, jumping), and crossing your legs (Both lying and sitting. Have a pillow between your legs when sleeping on your side or rolling over.)  Knee Replacements:   Range of Motion Goal: Minimum of 90 degrees with eventual progression to as much as 120 with continued exercise and time.  Activities to avoid: Kneeling. NEVER place a pillow/blanket under your knee when sitting or lying. Although this position is comfortable, it allows your knee to rest flexed. In the long term, this can lead to an inability to straighten your leg all the way, which is a very difficult problem to treat. Instead, keep your knee completely straight by placing a pillow or blanket under your heel.  Hip Replacements:  Anterior hip replacement activities to avoid: Large steps backwards, pointing your toes away from the middle of your body, lying on your stomach, and stretching/arching your back  Posterior hip replacement activities to avoid: Bending forward at the waist to  items or put on shoes/socks/underwear (instead have your arms between your legs and bend at your knees), bending your hip more than 90 degrees when getting on/off of low chairs, toilets, and sofas (make sure you knees are always below the level of your hip and use your arms to help you up instead of leaning forward), and pointing your toes inwards    Wound Care and Personal Hygiene  Your rubber bandage is waterproof. You may shower with it on. Your home therapst will remove it 1 week after surgery.   For knee replacements: The therapist  will leave it off. You can continue to shower, letting water run over it and lightly cleaning with regular soap (do not scrub).  Hip replacementss: The therapist will replace it once per week with a similar bandage until your first clinic appointment  Do not bath, swim, or soak your incision until your team tells you it is okay (typically at least 6 weeks after surgery).  If you have continued drainage or bleeding, send the team a picture through [employer] with a description of the drainage (color/odor.amount) and whether you are having any other symptoms (fever/nausea/vomit). If you had a knee replacement, wrap your leg with an ace wrap.  If you have Steri-Strips (small white pieces of tape) across your incision, leave the strips in place until they fall off their own.   If you have staples or stiches outside your skin, they will be removed at your 2 week follow-up appointment. You can let water run over these but should not soak them in water..  You may see small clear stitch tails sticking out of the incision. Do not cut or pick them. Cover them with a bandage until they dissolve on their own.  Do not apply lotions/creams/ointments to the incision until the team tells you it is okay (typically at least 8 weeks after surgery). Once approved, you may use vitamin E on the skin.    Pain, Swelling, Bruising, Numbness  Pain and swelling are normal for up to one year and can increase with excess activity or exercise. They can be reduced with rest, ice, compression stockings, leg elevation, and the prescribed pain medication regimen.  Wear the provided compression stockings during the day for one month after surgery, removing at night since your legs are elevated.  Bruising is normal for several weeks and will gradually subside.  Numbness or tingling in the skin surrounding the incision is common. Normal sensation may or may not return.  In general, it is common for the surgical area to not return completely to the way it  was before surgery.    Clicking  Depending on the type of implant used in your joint, clicking with motion can be normal and is a result of the mechanical parts replacing the function of your original joint.    Driving and Travel  To help prevent blood clots, you should wait at least 6weeks before traveling long distances after surgery. If traveling by car, stop and walk around every hour. If traveling by plane, walk the aisle every hour and perform ankle pumps while seated.  When it is safe to return to driving after surgery is at your discretion. The primary concern is the ability to quickly and firmly press the brake pedal. At a minimum, you must be off all narcotic pain medications and able to walk with a cane.    Ice and Cold Therapy  All patients should use ice for at least the first 6 weeks after surgery to help with pain and swelling.  Do not place ice directly on skin. Always have a layer of protection (clothing, towel) between them.   If using ice/gel packs, alternate 20 minutes on/off.  If using an ice machine or cold therapy device, follow the usage and cleaning instructions provided with it. Typically, you do not need to take breaks like you do with ice/gel packs. You should detach the pad from the cooler, check your skin under the pad, and ambulate at least once every hour. You may wear the cold therapy pad to sleep. Empty the cooler and pad when not using the device.     Emergencies  Reasons to call the office:  24 hours of fever above 101 F.  Excruciating pain not helped even when following the pain medication instructions.  Increased swelling not helped with rest, elevation, and ice.  New calf pain that is warm and tender to touch.  You have excessive bleeding from the incision that will not slow down. A small amount of drainage is normal and expected. When this occurs, apply pressure and cover the wound, only checking on it every 4-6 hours so that pressure stays applied.  Signs of an infection  around the incision (excessive drainage soaking through dressing (especially if it is colored/malodorous), redness spreading out from the edges of your incision, or significantly increased warmth around the area (some warmth is normal)).  Reason to go directly to the emergency room or call 911:  You experience chest pain or difficulty breathing.    Travel Program  If you are part of the travel program ([employer], [place], [email or web address] employees), follow the same above instructions. The one difference is that you need to see your surgeon in clinic 1 week before traveling home. For questions, contact Suly Garcia RN ( [phone]).    Discharge Medication Instructions  Carefully follow instructions  Many problems that patients develop after discharge are a result of not taking the proper medications at the proper time. Please read and carefully follow the below instructions and pharmacy labels to maximize your successful recovery.  Medication refills  Only Ultram and oxycodone are eligible for refills. The government strictly controls these medications. In special circumstances, refills may be provided once every 7 days for up to 6 weeks after surgery.  Refill requests are only processed Monday through Friday before 1pm. If you anticipate needing a refill, contact the office several days before to account for this schedule. If you leave a message, specify your preferred pharmacy location.  You will not receive a return call stating your prescription is refilled. Please contact your pharmacy to confirm it is available.  The other medications will only be prescribed once when you leave the hospital.  Side effects  Side effects are common, especially with tramadol and oxycodone. Please read the pharmacy packages carefully.    Discharge Medication List  Pain medication: Treat pain medications like building blocks. Always start by taking the medications at the bottom of the stack. If your pain is uncontrolled, continue taking those  bottom medications and add a higher block. When your pain is controlled, take down the stack by removing the higher blocks first.   Bottom blocks (for mild pain)  Acetaminophen (Tylenol): If you have liver problems, please consult with your primary care physician regarding the dose.  Pregabalin (Lyrica), meloxicam (Mobic), and celecoxib (Celebrex): Do not be alarmed if you do not receive a prescription for these medications. They cannot be taken with certain medical conditions or ages, so your team may not have provided them.  Middle block: Tramadol (Ultram) (for moderate pain)  Your goal is to be completely off of this medication 1-2 weeks after surgery.  Do not take this medication at the same time as oxycodone.  Do not operate a motor vehicle while taking this medication.  Top block: Oxycodone (for severe pain)  Same instructions as tramadol.  Do not take this medication at the same time as tramadol.    Blood thinner  This medication is to prevent blood clots from forming in your legs or lungs. Take it as prescribed for the entire duration.  The specific medication prescribed to you depends on a variety of factors. Possibilities include a medication you were on before surgery, aspirin, Eliquis, Xarelto, Lovenox, Warfarin, or some combination thereof.  Note: If you are prescribed aspirin, it is very important that you take it for the entire prescription duration. Do not stop taking it if your pain is under control. This prescription is to prevent blood clots, not to control pain.  Anti-nausea (Proton pump inhibitor (PPI))  This medication is to prevent nausea and protect your stomach from ulcers while taking the other prescribed medications. Take it as prescribed for the entire duration.  If you were already on a PPI before surgery, you will resume that medication. If not, you will be provided a prescription for pantoprazole (Protonix).   Anti-constipation  Constipation is a common side effect of tramadol and  oxycodone.  You have been provided prescriptions for stool softeners (docusate sodium (Colace) and polyethylene glycol (Miralax)). Take both of these medications while taking tramadol and oxycodone to help minimize constipation.  If you continue to have constipation despite taking these two medications, you may add over the counter bisacodyl (Dulcolax) suppositories.     Example Medication Schedules  Since you will be taking multiple medications, patients have found the below example schedules to be helpful when weaning off pain medication. They do not show your blood thinner since that will vary by patient.  Step 1: When you first return home and have the most pain  All the time: Ice/cold therapy  3am: Pain (Ultram)  6am: Pain (oxycodone), Constipation (Miralax)  8am: Pain (Tylenol)  9am: Pain (Ultram (and Mobic/Celebrex/Lyrica for some patients)), PPI (Protonix), Constipation (Colace)  Noon: Pain (oxycodone)  3pm: Pain (Ultram)  4pm: Pain (Tylenol)  6pm: Pain (oxycodone), Constipation (Miralax)  9pm: Pain (Ultram (and Celebrex/Lyrica for some patients)), constipation (Colace)  Midnight: Pain (Tylenol, oxycodone)  Step 2: As your pain begins to decrease, you can space out your pain medications  All the time: Ice/cold therapy  4am: Pain (oxycodone), Constipation (Colace)  8am: Pain (Tylenol, Ultram (and Mobic/Celebrex/Lyrica for some patients)), PPI (Protonix)  Noon: Pain (oxycodone), Constipation (Miralax)  4pm: Pain (Tylenol, Ultram), Constipation (Colace)  8pm: Pain (oxycodone (and Celebrex/Lyrica for some patients))  Midnight: Pain (Tylenol, Ultram)  Step 3: As your pain continues to decrease, you can remove the top block of your pain pyramid (oxycodone)  All the time: Ice/cold therapy  4am: Pain (Tylenol), Constipation (Colace)  8am: Pain (Ultram (and Mobic/Celebrex/Lyrica for some patients)), PPI (Protonix)  Noon: Pain (Tylenol), Constipation (Miralax)  4pm: Pain (Ultram), Constipation (Colace)  8pm: Pain  (Tylenol (and Celebrex/Lyrica for some patients))  Midnight: Pain (Ultram)  Step 4: As your pain continues to decrease, you can remove the middle block of your pain pyramid (Ultram) and the constipation medication  All the time: Ice/cold therapy  4am: Pain (Tylenol)  Noon: Pain (Tylenol (and Mobic/Celebrex/Lyrica for some patients)), PPI (Protonix)  8pm: Pain (Tylenol)  Midnight: Pain (Celebrex/Lyrica for some patients)

## 2025-03-11 ENCOUNTER — ANESTHESIA EVENT (OUTPATIENT)
Dept: OPERATING ROOM | Facility: HOSPITAL | Age: 75
End: 2025-03-11
Payer: MEDICARE

## 2025-03-12 ENCOUNTER — HOME HEALTH ADMISSION (OUTPATIENT)
Dept: HOME HEALTH SERVICES | Facility: HOME HEALTH | Age: 75
End: 2025-03-12
Payer: MEDICARE

## 2025-03-12 ENCOUNTER — ANESTHESIA (OUTPATIENT)
Dept: OPERATING ROOM | Facility: HOSPITAL | Age: 75
End: 2025-03-12
Payer: MEDICARE

## 2025-03-12 ENCOUNTER — PHARMACY VISIT (OUTPATIENT)
Dept: PHARMACY | Facility: CLINIC | Age: 75
End: 2025-03-12
Payer: COMMERCIAL

## 2025-03-12 ENCOUNTER — HOSPITAL ENCOUNTER (OUTPATIENT)
Facility: HOSPITAL | Age: 75
LOS: 1 days | Discharge: HOME HEALTH | End: 2025-03-13
Attending: STUDENT IN AN ORGANIZED HEALTH CARE EDUCATION/TRAINING PROGRAM | Admitting: STUDENT IN AN ORGANIZED HEALTH CARE EDUCATION/TRAINING PROGRAM
Payer: MEDICARE

## 2025-03-12 ENCOUNTER — APPOINTMENT (OUTPATIENT)
Dept: RADIOLOGY | Facility: HOSPITAL | Age: 75
End: 2025-03-12
Payer: MEDICARE

## 2025-03-12 DIAGNOSIS — M17.12 ARTHRITIS OF LEFT KNEE: ICD-10-CM

## 2025-03-12 DIAGNOSIS — Z96.652 HISTORY OF TOTAL KNEE ARTHROPLASTY, LEFT: Primary | ICD-10-CM

## 2025-03-12 PROCEDURE — C1713 ANCHOR/SCREW BN/BN,TIS/BN: HCPCS | Performed by: STUDENT IN AN ORGANIZED HEALTH CARE EDUCATION/TRAINING PROGRAM

## 2025-03-12 PROCEDURE — 64447 NJX AA&/STRD FEMORAL NRV IMG: CPT | Performed by: ANESTHESIOLOGY

## 2025-03-12 PROCEDURE — 2500000004 HC RX 250 GENERAL PHARMACY W/ HCPCS (ALT 636 FOR OP/ED): Performed by: STUDENT IN AN ORGANIZED HEALTH CARE EDUCATION/TRAINING PROGRAM

## 2025-03-12 PROCEDURE — 1100000001 HC PRIVATE ROOM DAILY

## 2025-03-12 PROCEDURE — A27447 PR TOTAL KNEE ARTHROPLASTY: Performed by: ANESTHESIOLOGY

## 2025-03-12 PROCEDURE — 27447 TOTAL KNEE ARTHROPLASTY: CPT | Performed by: STUDENT IN AN ORGANIZED HEALTH CARE EDUCATION/TRAINING PROGRAM

## 2025-03-12 PROCEDURE — A27447 PR TOTAL KNEE ARTHROPLASTY: Performed by: NURSE ANESTHETIST, CERTIFIED REGISTERED

## 2025-03-12 PROCEDURE — 2720000007 HC OR 272 NO HCPCS: Performed by: STUDENT IN AN ORGANIZED HEALTH CARE EDUCATION/TRAINING PROGRAM

## 2025-03-12 PROCEDURE — 3600000018 HC OR TIME - INITIAL BASE CHARGE - PROCEDURE LEVEL SIX: Performed by: STUDENT IN AN ORGANIZED HEALTH CARE EDUCATION/TRAINING PROGRAM

## 2025-03-12 PROCEDURE — 64473 LWR XTR FSCL PLN BLK UNI NJX: CPT

## 2025-03-12 PROCEDURE — 73560 X-RAY EXAM OF KNEE 1 OR 2: CPT | Mod: LEFT SIDE | Performed by: RADIOLOGY

## 2025-03-12 PROCEDURE — 0055T BONE SRGRY CMPTR CT/MRI IMAG: CPT | Performed by: STUDENT IN AN ORGANIZED HEALTH CARE EDUCATION/TRAINING PROGRAM

## 2025-03-12 PROCEDURE — A4649 SURGICAL SUPPLIES: HCPCS | Performed by: STUDENT IN AN ORGANIZED HEALTH CARE EDUCATION/TRAINING PROGRAM

## 2025-03-12 PROCEDURE — 3600000017 HC OR TIME - EACH INCREMENTAL 1 MINUTE - PROCEDURE LEVEL SIX: Performed by: STUDENT IN AN ORGANIZED HEALTH CARE EDUCATION/TRAINING PROGRAM

## 2025-03-12 PROCEDURE — 73560 X-RAY EXAM OF KNEE 1 OR 2: CPT | Mod: LT

## 2025-03-12 PROCEDURE — 7100000001 HC RECOVERY ROOM TIME - INITIAL BASE CHARGE: Performed by: STUDENT IN AN ORGANIZED HEALTH CARE EDUCATION/TRAINING PROGRAM

## 2025-03-12 PROCEDURE — 2500000005 HC RX 250 GENERAL PHARMACY W/O HCPCS: Performed by: NURSE ANESTHETIST, CERTIFIED REGISTERED

## 2025-03-12 PROCEDURE — 99221 1ST HOSP IP/OBS SF/LOW 40: CPT | Performed by: INTERNAL MEDICINE

## 2025-03-12 PROCEDURE — 99100 ANES PT EXTEME AGE<1 YR&>70: CPT | Performed by: ANESTHESIOLOGY

## 2025-03-12 PROCEDURE — 2500000001 HC RX 250 WO HCPCS SELF ADMINISTERED DRUGS (ALT 637 FOR MEDICARE OP): Performed by: STUDENT IN AN ORGANIZED HEALTH CARE EDUCATION/TRAINING PROGRAM

## 2025-03-12 PROCEDURE — 7100000002 HC RECOVERY ROOM TIME - EACH INCREMENTAL 1 MINUTE: Performed by: STUDENT IN AN ORGANIZED HEALTH CARE EDUCATION/TRAINING PROGRAM

## 2025-03-12 PROCEDURE — 2500000001 HC RX 250 WO HCPCS SELF ADMINISTERED DRUGS (ALT 637 FOR MEDICARE OP): Performed by: HOSPITALIST

## 2025-03-12 PROCEDURE — C1776 JOINT DEVICE (IMPLANTABLE): HCPCS | Performed by: STUDENT IN AN ORGANIZED HEALTH CARE EDUCATION/TRAINING PROGRAM

## 2025-03-12 PROCEDURE — 3700000001 HC GENERAL ANESTHESIA TIME - INITIAL BASE CHARGE: Performed by: STUDENT IN AN ORGANIZED HEALTH CARE EDUCATION/TRAINING PROGRAM

## 2025-03-12 PROCEDURE — 2500000004 HC RX 250 GENERAL PHARMACY W/ HCPCS (ALT 636 FOR OP/ED)

## 2025-03-12 PROCEDURE — 64999 UNLISTED PX NERVOUS SYSTEM: CPT | Performed by: ANESTHESIOLOGY

## 2025-03-12 PROCEDURE — 2780000003 HC OR 278 NO HCPCS: Performed by: STUDENT IN AN ORGANIZED HEALTH CARE EDUCATION/TRAINING PROGRAM

## 2025-03-12 PROCEDURE — 2500000005 HC RX 250 GENERAL PHARMACY W/O HCPCS: Performed by: STUDENT IN AN ORGANIZED HEALTH CARE EDUCATION/TRAINING PROGRAM

## 2025-03-12 PROCEDURE — 2500000002 HC RX 250 W HCPCS SELF ADMINISTERED DRUGS (ALT 637 FOR MEDICARE OP, ALT 636 FOR OP/ED): Performed by: STUDENT IN AN ORGANIZED HEALTH CARE EDUCATION/TRAINING PROGRAM

## 2025-03-12 PROCEDURE — 3700000002 HC GENERAL ANESTHESIA TIME - EACH INCREMENTAL 1 MINUTE: Performed by: STUDENT IN AN ORGANIZED HEALTH CARE EDUCATION/TRAINING PROGRAM

## 2025-03-12 PROCEDURE — 2500000004 HC RX 250 GENERAL PHARMACY W/ HCPCS (ALT 636 FOR OP/ED): Performed by: NURSE ANESTHETIST, CERTIFIED REGISTERED

## 2025-03-12 PROCEDURE — RXMED WILLOW AMBULATORY MEDICATION CHARGE

## 2025-03-12 DEVICE — FEM COMP, TRIATH CR SZ3 LEFT: Type: IMPLANTABLE DEVICE | Site: KNEE | Status: FUNCTIONAL

## 2025-03-12 DEVICE — IMPLANTABLE DEVICE: Type: IMPLANTABLE DEVICE | Site: KNEE | Status: FUNCTIONAL

## 2025-03-12 DEVICE — CEMENT, BONE, SIMPLEX HV FULL DOSE W/GENTAMYCIN 40 GM: Type: IMPLANTABLE DEVICE | Site: KNEE | Status: FUNCTIONAL

## 2025-03-12 DEVICE — PATELLA, TRIATHLON, ASYMMETRIC X3, SZ-A32 10MM: Type: IMPLANTABLE DEVICE | Site: KNEE | Status: FUNCTIONAL

## 2025-03-12 DEVICE — BASEPLATE, TIBIA 3 TS TRIATH: Type: IMPLANTABLE DEVICE | Site: KNEE | Status: FUNCTIONAL

## 2025-03-12 RX ORDER — BISACODYL 10 MG/1
10 SUPPOSITORY RECTAL DAILY PRN
Status: DISCONTINUED | OUTPATIENT
Start: 2025-03-12 | End: 2025-03-13 | Stop reason: HOSPADM

## 2025-03-12 RX ORDER — CYCLOBENZAPRINE HCL 10 MG
5 TABLET ORAL 3 TIMES DAILY PRN
Status: DISCONTINUED | OUTPATIENT
Start: 2025-03-12 | End: 2025-03-13 | Stop reason: HOSPADM

## 2025-03-12 RX ORDER — KETOROLAC TROMETHAMINE 30 MG/ML
INJECTION, SOLUTION INTRAMUSCULAR; INTRAVENOUS AS NEEDED
Status: DISCONTINUED | OUTPATIENT
Start: 2025-03-12 | End: 2025-03-12

## 2025-03-12 RX ORDER — CELECOXIB 200 MG/1
200 CAPSULE ORAL ONCE
Status: COMPLETED | OUTPATIENT
Start: 2025-03-12 | End: 2025-03-12

## 2025-03-12 RX ORDER — ONDANSETRON HYDROCHLORIDE 2 MG/ML
4 INJECTION, SOLUTION INTRAVENOUS EVERY 4 HOURS PRN
Status: DISCONTINUED | OUTPATIENT
Start: 2025-03-12 | End: 2025-03-13 | Stop reason: HOSPADM

## 2025-03-12 RX ORDER — ACETAMINOPHEN 325 MG/1
975 TABLET ORAL ONCE
Status: COMPLETED | OUTPATIENT
Start: 2025-03-12 | End: 2025-03-12

## 2025-03-12 RX ORDER — PANTOPRAZOLE SODIUM 40 MG/1
40 TABLET, DELAYED RELEASE ORAL
Status: DISCONTINUED | OUTPATIENT
Start: 2025-03-13 | End: 2025-03-13 | Stop reason: HOSPADM

## 2025-03-12 RX ORDER — SODIUM CHLORIDE, SODIUM LACTATE, POTASSIUM CHLORIDE, CALCIUM CHLORIDE 600; 310; 30; 20 MG/100ML; MG/100ML; MG/100ML; MG/100ML
INJECTION, SOLUTION INTRAVENOUS CONTINUOUS PRN
Status: DISCONTINUED | OUTPATIENT
Start: 2025-03-12 | End: 2025-03-12

## 2025-03-12 RX ORDER — DEXAMETHASONE SODIUM PHOSPHATE 10 MG/ML
5 INJECTION INTRAMUSCULAR; INTRAVENOUS ONCE
Status: COMPLETED | OUTPATIENT
Start: 2025-03-12 | End: 2025-03-12

## 2025-03-12 RX ORDER — ALBUTEROL SULFATE 0.83 MG/ML
2.5 SOLUTION RESPIRATORY (INHALATION) ONCE AS NEEDED
Status: DISCONTINUED | OUTPATIENT
Start: 2025-03-12 | End: 2025-03-12 | Stop reason: HOSPADM

## 2025-03-12 RX ORDER — ACETAMINOPHEN 325 MG/1
650 TABLET ORAL EVERY 4 HOURS PRN
Status: DISCONTINUED | OUTPATIENT
Start: 2025-03-12 | End: 2025-03-13 | Stop reason: HOSPADM

## 2025-03-12 RX ORDER — HYDROMORPHONE HYDROCHLORIDE 0.2 MG/ML
0.2 INJECTION INTRAMUSCULAR; INTRAVENOUS; SUBCUTANEOUS EVERY 5 MIN PRN
Status: DISCONTINUED | OUTPATIENT
Start: 2025-03-12 | End: 2025-03-12 | Stop reason: HOSPADM

## 2025-03-12 RX ORDER — ONDANSETRON HYDROCHLORIDE 2 MG/ML
INJECTION, SOLUTION INTRAVENOUS AS NEEDED
Status: DISCONTINUED | OUTPATIENT
Start: 2025-03-12 | End: 2025-03-12

## 2025-03-12 RX ORDER — DOCUSATE SODIUM 100 MG/1
100 CAPSULE, LIQUID FILLED ORAL 2 TIMES DAILY
Status: DISCONTINUED | OUTPATIENT
Start: 2025-03-12 | End: 2025-03-13 | Stop reason: HOSPADM

## 2025-03-12 RX ORDER — ACETAMINOPHEN 325 MG/1
650 TABLET ORAL EVERY 4 HOURS PRN
Status: DISCONTINUED | OUTPATIENT
Start: 2025-03-12 | End: 2025-03-12 | Stop reason: HOSPADM

## 2025-03-12 RX ORDER — POLYETHYLENE GLYCOL 3350 17 G/17G
17 POWDER, FOR SOLUTION ORAL DAILY
Status: DISCONTINUED | OUTPATIENT
Start: 2025-03-12 | End: 2025-03-13 | Stop reason: HOSPADM

## 2025-03-12 RX ORDER — AMLODIPINE BESYLATE 5 MG/1
10 TABLET ORAL DAILY
Status: DISCONTINUED | OUTPATIENT
Start: 2025-03-12 | End: 2025-03-13 | Stop reason: HOSPADM

## 2025-03-12 RX ORDER — OXYCODONE HYDROCHLORIDE 5 MG/1
10 TABLET ORAL EVERY 4 HOURS PRN
Status: DISCONTINUED | OUTPATIENT
Start: 2025-03-12 | End: 2025-03-12 | Stop reason: HOSPADM

## 2025-03-12 RX ORDER — OXYCODONE HYDROCHLORIDE 5 MG/1
5 TABLET ORAL EVERY 6 HOURS PRN
Status: DISCONTINUED | OUTPATIENT
Start: 2025-03-12 | End: 2025-03-12

## 2025-03-12 RX ORDER — CEFAZOLIN SODIUM 2 G/100ML
2 INJECTION, SOLUTION INTRAVENOUS ONCE
Status: COMPLETED | OUTPATIENT
Start: 2025-03-12 | End: 2025-03-12

## 2025-03-12 RX ORDER — MIDAZOLAM HYDROCHLORIDE 1 MG/ML
2 INJECTION, SOLUTION INTRAMUSCULAR; INTRAVENOUS ONCE
Status: COMPLETED | OUTPATIENT
Start: 2025-03-12 | End: 2025-03-12

## 2025-03-12 RX ORDER — PHENYLEPHRINE HCL IN 0.9% NACL 1 MG/10 ML
SYRINGE (ML) INTRAVENOUS AS NEEDED
Status: DISCONTINUED | OUTPATIENT
Start: 2025-03-12 | End: 2025-03-12

## 2025-03-12 RX ORDER — CEFADROXIL 500 MG/1
500 CAPSULE ORAL 2 TIMES DAILY
Status: DISCONTINUED | OUTPATIENT
Start: 2025-03-13 | End: 2025-03-13 | Stop reason: HOSPADM

## 2025-03-12 RX ORDER — BUPIVACAINE HYDROCHLORIDE 7.5 MG/ML
INJECTION INTRAVENOUS AS NEEDED
Status: DISCONTINUED | OUTPATIENT
Start: 2025-03-12 | End: 2025-03-12

## 2025-03-12 RX ORDER — TRANEXAMIC ACID 650 MG/1
1950 TABLET ORAL ONCE
Status: COMPLETED | OUTPATIENT
Start: 2025-03-13 | End: 2025-03-13

## 2025-03-12 RX ORDER — FENTANYL CITRATE 50 UG/ML
INJECTION, SOLUTION INTRAMUSCULAR; INTRAVENOUS AS NEEDED
Status: DISCONTINUED | OUTPATIENT
Start: 2025-03-12 | End: 2025-03-12

## 2025-03-12 RX ORDER — METOCLOPRAMIDE 10 MG/1
10 TABLET ORAL EVERY 6 HOURS PRN
Status: DISCONTINUED | OUTPATIENT
Start: 2025-03-12 | End: 2025-03-13 | Stop reason: HOSPADM

## 2025-03-12 RX ORDER — METOCLOPRAMIDE HYDROCHLORIDE 5 MG/ML
10 INJECTION INTRAMUSCULAR; INTRAVENOUS EVERY 6 HOURS PRN
Status: DISCONTINUED | OUTPATIENT
Start: 2025-03-12 | End: 2025-03-13 | Stop reason: HOSPADM

## 2025-03-12 RX ORDER — HYDROCHLOROTHIAZIDE 25 MG/1
25 TABLET ORAL DAILY
Status: DISCONTINUED | OUTPATIENT
Start: 2025-03-12 | End: 2025-03-13 | Stop reason: HOSPADM

## 2025-03-12 RX ORDER — OXYCODONE HYDROCHLORIDE 5 MG/1
5 TABLET ORAL EVERY 4 HOURS PRN
Status: DISCONTINUED | OUTPATIENT
Start: 2025-03-12 | End: 2025-03-13 | Stop reason: HOSPADM

## 2025-03-12 RX ORDER — DIAZEPAM 2 MG/1
2 TABLET ORAL EVERY 8 HOURS PRN
Status: DISCONTINUED | OUTPATIENT
Start: 2025-03-12 | End: 2025-03-13 | Stop reason: HOSPADM

## 2025-03-12 RX ORDER — SCOPOLAMINE 1 MG/3D
1 PATCH, EXTENDED RELEASE TRANSDERMAL
Status: DISCONTINUED | OUTPATIENT
Start: 2025-03-12 | End: 2025-03-13 | Stop reason: HOSPADM

## 2025-03-12 RX ORDER — TRANEXAMIC ACID 100 MG/ML
INJECTION, SOLUTION INTRAVENOUS AS NEEDED
Status: DISCONTINUED | OUTPATIENT
Start: 2025-03-12 | End: 2025-03-12

## 2025-03-12 RX ORDER — PROPOFOL 10 MG/ML
INJECTION, EMULSION INTRAVENOUS AS NEEDED
Status: DISCONTINUED | OUTPATIENT
Start: 2025-03-12 | End: 2025-03-12

## 2025-03-12 RX ORDER — KETOROLAC TROMETHAMINE 15 MG/ML
15 INJECTION, SOLUTION INTRAMUSCULAR; INTRAVENOUS EVERY 6 HOURS
Status: DISCONTINUED | OUTPATIENT
Start: 2025-03-12 | End: 2025-03-13 | Stop reason: HOSPADM

## 2025-03-12 RX ORDER — LIDOCAINE 560 MG/1
1 PATCH PERCUTANEOUS; TOPICAL; TRANSDERMAL DAILY
Status: DISCONTINUED | OUTPATIENT
Start: 2025-03-12 | End: 2025-03-13 | Stop reason: HOSPADM

## 2025-03-12 RX ORDER — LIDOCAINE HYDROCHLORIDE 10 MG/ML
INJECTION, SOLUTION EPIDURAL; INFILTRATION; INTRACAUDAL; PERINEURAL AS NEEDED
Status: DISCONTINUED | OUTPATIENT
Start: 2025-03-12 | End: 2025-03-12

## 2025-03-12 RX ORDER — PREGABALIN 75 MG/1
75 CAPSULE ORAL 2 TIMES DAILY
Status: DISCONTINUED | OUTPATIENT
Start: 2025-03-12 | End: 2025-03-13 | Stop reason: HOSPADM

## 2025-03-12 RX ORDER — MIDAZOLAM HYDROCHLORIDE 1 MG/ML
INJECTION, SOLUTION INTRAMUSCULAR; INTRAVENOUS AS NEEDED
Status: DISCONTINUED | OUTPATIENT
Start: 2025-03-12 | End: 2025-03-12

## 2025-03-12 RX ORDER — OXYCODONE HYDROCHLORIDE 5 MG/1
10 TABLET ORAL EVERY 4 HOURS PRN
Status: DISCONTINUED | OUTPATIENT
Start: 2025-03-12 | End: 2025-03-13 | Stop reason: HOSPADM

## 2025-03-12 RX ORDER — FENTANYL CITRATE 50 UG/ML
50 INJECTION, SOLUTION INTRAMUSCULAR; INTRAVENOUS ONCE
Status: COMPLETED | OUTPATIENT
Start: 2025-03-12 | End: 2025-03-12

## 2025-03-12 RX ORDER — PREGABALIN 75 MG/1
75 CAPSULE ORAL ONCE
Status: COMPLETED | OUTPATIENT
Start: 2025-03-12 | End: 2025-03-12

## 2025-03-12 RX ORDER — NALOXONE HYDROCHLORIDE 0.4 MG/ML
0.2 INJECTION, SOLUTION INTRAMUSCULAR; INTRAVENOUS; SUBCUTANEOUS EVERY 5 MIN PRN
Status: DISCONTINUED | OUTPATIENT
Start: 2025-03-12 | End: 2025-03-13 | Stop reason: HOSPADM

## 2025-03-12 RX ORDER — ONDANSETRON HYDROCHLORIDE 2 MG/ML
4 INJECTION, SOLUTION INTRAVENOUS ONCE AS NEEDED
Status: DISCONTINUED | OUTPATIENT
Start: 2025-03-12 | End: 2025-03-12 | Stop reason: HOSPADM

## 2025-03-12 RX ORDER — ROPIVACAINE/EPI/CLONIDINE/KET 2.46-0.005
SYRINGE (ML) INJECTION AS NEEDED
Status: DISCONTINUED | OUTPATIENT
Start: 2025-03-12 | End: 2025-03-12 | Stop reason: HOSPADM

## 2025-03-12 RX ORDER — ONDANSETRON 4 MG/1
4 TABLET, ORALLY DISINTEGRATING ORAL EVERY 8 HOURS PRN
Status: DISCONTINUED | OUTPATIENT
Start: 2025-03-12 | End: 2025-03-12

## 2025-03-12 RX ORDER — SODIUM CHLORIDE, SODIUM LACTATE, POTASSIUM CHLORIDE, CALCIUM CHLORIDE 600; 310; 30; 20 MG/100ML; MG/100ML; MG/100ML; MG/100ML
50 INJECTION, SOLUTION INTRAVENOUS CONTINUOUS
Status: DISCONTINUED | OUTPATIENT
Start: 2025-03-12 | End: 2025-03-12

## 2025-03-12 RX ORDER — OXYCODONE HYDROCHLORIDE 5 MG/1
5 TABLET ORAL EVERY 4 HOURS PRN
Status: DISCONTINUED | OUTPATIENT
Start: 2025-03-12 | End: 2025-03-12 | Stop reason: HOSPADM

## 2025-03-12 RX ORDER — ACETAMINOPHEN 500 MG
10 TABLET ORAL DAILY PRN
Status: DISCONTINUED | OUTPATIENT
Start: 2025-03-12 | End: 2025-03-13 | Stop reason: HOSPADM

## 2025-03-12 RX ORDER — ONDANSETRON HYDROCHLORIDE 2 MG/ML
4 INJECTION, SOLUTION INTRAVENOUS EVERY 8 HOURS PRN
Status: DISCONTINUED | OUTPATIENT
Start: 2025-03-12 | End: 2025-03-12

## 2025-03-12 RX ORDER — ONDANSETRON 4 MG/1
4 TABLET, ORALLY DISINTEGRATING ORAL EVERY 8 HOURS PRN
Status: DISCONTINUED | OUTPATIENT
Start: 2025-03-12 | End: 2025-03-13 | Stop reason: HOSPADM

## 2025-03-12 RX ORDER — TRANEXAMIC ACID 650 MG/1
1950 TABLET ORAL ONCE
Status: COMPLETED | OUTPATIENT
Start: 2025-03-12 | End: 2025-03-12

## 2025-03-12 RX ORDER — CEFAZOLIN SODIUM 2 G/100ML
2 INJECTION, SOLUTION INTRAVENOUS EVERY 8 HOURS
Status: COMPLETED | OUTPATIENT
Start: 2025-03-12 | End: 2025-03-13

## 2025-03-12 RX ORDER — SODIUM CHLORIDE, SODIUM LACTATE, POTASSIUM CHLORIDE, CALCIUM CHLORIDE 600; 310; 30; 20 MG/100ML; MG/100ML; MG/100ML; MG/100ML
75 INJECTION, SOLUTION INTRAVENOUS CONTINUOUS
Status: DISCONTINUED | OUTPATIENT
Start: 2025-03-12 | End: 2025-03-12 | Stop reason: HOSPADM

## 2025-03-12 RX ORDER — BISACODYL 5 MG
10 TABLET, DELAYED RELEASE (ENTERIC COATED) ORAL DAILY PRN
Status: DISCONTINUED | OUTPATIENT
Start: 2025-03-12 | End: 2025-03-13 | Stop reason: HOSPADM

## 2025-03-12 RX ADMIN — DEXAMETHASONE SODIUM PHOSPHATE 4 MG: 4 INJECTION, SOLUTION INTRAMUSCULAR; INTRAVENOUS at 12:18

## 2025-03-12 RX ADMIN — DOCUSATE SODIUM 100 MG: 100 CAPSULE, LIQUID FILLED ORAL at 20:17

## 2025-03-12 RX ADMIN — MIDAZOLAM 2 MG: 1 INJECTION INTRAMUSCULAR; INTRAVENOUS at 10:22

## 2025-03-12 RX ADMIN — Medication 200 MCG: at 13:29

## 2025-03-12 RX ADMIN — Medication 100 MCG: at 13:21

## 2025-03-12 RX ADMIN — Medication 100 MCG: at 12:58

## 2025-03-12 RX ADMIN — KETOROLAC TROMETHAMINE 15 MG: 15 INJECTION, SOLUTION INTRAMUSCULAR; INTRAVENOUS at 20:17

## 2025-03-12 RX ADMIN — POLYETHYLENE GLYCOL 3350 17 G: 17 POWDER, FOR SOLUTION ORAL at 17:25

## 2025-03-12 RX ADMIN — PREGABALIN 75 MG: 75 CAPSULE ORAL at 20:16

## 2025-03-12 RX ADMIN — Medication 100 MCG: at 13:36

## 2025-03-12 RX ADMIN — ONDANSETRON 4 MG: 2 INJECTION INTRAMUSCULAR; INTRAVENOUS at 14:25

## 2025-03-12 RX ADMIN — TRANEXAMIC ACID 1950 MG: 650 TABLET ORAL at 20:16

## 2025-03-12 RX ADMIN — Medication 100 MCG: at 12:51

## 2025-03-12 RX ADMIN — CELECOXIB 200 MG: 200 CAPSULE ORAL at 10:13

## 2025-03-12 RX ADMIN — TRANEXAMIC ACID 1000 MG: 100 INJECTION INTRAVENOUS at 14:31

## 2025-03-12 RX ADMIN — POVIDONE-IODINE 1 APPLICATION: 5 SOLUTION TOPICAL at 10:12

## 2025-03-12 RX ADMIN — FENTANYL CITRATE 50 MCG: 0.05 INJECTION, SOLUTION INTRAMUSCULAR; INTRAVENOUS at 12:13

## 2025-03-12 RX ADMIN — Medication 100 MCG: at 12:19

## 2025-03-12 RX ADMIN — SODIUM CHLORIDE, SODIUM LACTATE, POTASSIUM CHLORIDE, AND CALCIUM CHLORIDE 50 ML/HR: 600; 310; 30; 20 INJECTION, SOLUTION INTRAVENOUS at 17:27

## 2025-03-12 RX ADMIN — Medication 10 MG: at 20:37

## 2025-03-12 RX ADMIN — KETOROLAC TROMETHAMINE 15 MG: 30 INJECTION, SOLUTION INTRAMUSCULAR at 14:31

## 2025-03-12 RX ADMIN — APIXABAN 2.5 MG: 2.5 TABLET, FILM COATED ORAL at 20:17

## 2025-03-12 RX ADMIN — PROPOFOL 500 MG: 10 INJECTION, EMULSION INTRAVENOUS at 13:14

## 2025-03-12 RX ADMIN — Medication 2 L/MIN: at 17:04

## 2025-03-12 RX ADMIN — MIDAZOLAM 1 MG: 1 INJECTION INTRAMUSCULAR; INTRAVENOUS at 11:55

## 2025-03-12 RX ADMIN — SODIUM CHLORIDE, POTASSIUM CHLORIDE, SODIUM LACTATE AND CALCIUM CHLORIDE: 600; 310; 30; 20 INJECTION, SOLUTION INTRAVENOUS at 11:40

## 2025-03-12 RX ADMIN — Medication 100 MCG: at 13:54

## 2025-03-12 RX ADMIN — Medication 200 MCG: at 12:45

## 2025-03-12 RX ADMIN — PROPOFOL 200 MG: 10 INJECTION, EMULSION INTRAVENOUS at 14:29

## 2025-03-12 RX ADMIN — CEFAZOLIN SODIUM 2 G: 2 INJECTION, SOLUTION INTRAVENOUS at 11:41

## 2025-03-12 RX ADMIN — SODIUM CHLORIDE, POTASSIUM CHLORIDE, SODIUM LACTATE AND CALCIUM CHLORIDE: 600; 310; 30; 20 INJECTION, SOLUTION INTRAVENOUS at 14:11

## 2025-03-12 RX ADMIN — Medication 100 MCG: at 14:11

## 2025-03-12 RX ADMIN — Medication 100 MCG: at 13:03

## 2025-03-12 RX ADMIN — Medication 200 MCG: at 13:11

## 2025-03-12 RX ADMIN — SCOPOLAMINE 1 PATCH: 1.5 PATCH, EXTENDED RELEASE TRANSDERMAL at 10:13

## 2025-03-12 RX ADMIN — FENTANYL CITRATE 50 MCG: 0.05 INJECTION, SOLUTION INTRAMUSCULAR; INTRAVENOUS at 10:22

## 2025-03-12 RX ADMIN — HYDROMORPHONE HYDROCHLORIDE 0.2 MG: 0.2 INJECTION, SOLUTION INTRAMUSCULAR; INTRAVENOUS; SUBCUTANEOUS at 15:20

## 2025-03-12 RX ADMIN — PREGABALIN 75 MG: 75 CAPSULE ORAL at 10:13

## 2025-03-12 RX ADMIN — DEXAMETHASONE SODIUM PHOSPHATE 5 MG: 10 INJECTION INTRAMUSCULAR; INTRAVENOUS at 20:22

## 2025-03-12 RX ADMIN — TRANEXAMIC ACID 1000 MG: 100 INJECTION INTRAVENOUS at 12:14

## 2025-03-12 RX ADMIN — Medication 100 MCG: at 13:43

## 2025-03-12 RX ADMIN — MIDAZOLAM 1 MG: 1 INJECTION INTRAMUSCULAR; INTRAVENOUS at 11:47

## 2025-03-12 RX ADMIN — CEFAZOLIN SODIUM 2 G: 2 INJECTION, SOLUTION INTRAVENOUS at 18:37

## 2025-03-12 RX ADMIN — PROPOFOL 500 MG: 10 INJECTION, EMULSION INTRAVENOUS at 12:14

## 2025-03-12 RX ADMIN — Medication 100 MCG: at 12:33

## 2025-03-12 RX ADMIN — LIDOCAINE HYDROCHLORIDE 5 ML: 10 INJECTION, SOLUTION EPIDURAL; INFILTRATION; INTRACAUDAL; PERINEURAL at 12:14

## 2025-03-12 RX ADMIN — BUPIVACAINE HYDROCHLORIDE IN DEXTROSE 1.4 ML: 7.5 INJECTION, SOLUTION SUBARACHNOID at 12:10

## 2025-03-12 RX ADMIN — LIDOCAINE 4% 1 PATCH: 40 PATCH TOPICAL at 17:24

## 2025-03-12 RX ADMIN — ACETAMINOPHEN 975 MG: 325 TABLET ORAL at 10:12

## 2025-03-12 SDOH — ECONOMIC STABILITY: HOUSING INSECURITY: IN THE PAST 12 MONTHS, HOW MANY TIMES HAVE YOU MOVED WHERE YOU WERE LIVING?: 0

## 2025-03-12 SDOH — ECONOMIC STABILITY: HOUSING INSECURITY: IN THE LAST 12 MONTHS, WAS THERE A TIME WHEN YOU WERE NOT ABLE TO PAY THE MORTGAGE OR RENT ON TIME?: NO

## 2025-03-12 SDOH — SOCIAL STABILITY: SOCIAL INSECURITY: WITHIN THE LAST YEAR, HAVE YOU BEEN AFRAID OF YOUR PARTNER OR EX-PARTNER?: NO

## 2025-03-12 SDOH — SOCIAL STABILITY: SOCIAL INSECURITY: WITHIN THE LAST YEAR, HAVE YOU BEEN HUMILIATED OR EMOTIONALLY ABUSED IN OTHER WAYS BY YOUR PARTNER OR EX-PARTNER?: NO

## 2025-03-12 SDOH — SOCIAL STABILITY: SOCIAL INSECURITY: WERE YOU ABLE TO COMPLETE ALL THE BEHAVIORAL HEALTH SCREENINGS?: NO

## 2025-03-12 SDOH — SOCIAL STABILITY: SOCIAL INSECURITY: HAVE YOU HAD THOUGHTS OF HARMING ANYONE ELSE?: NO

## 2025-03-12 SDOH — ECONOMIC STABILITY: TRANSPORTATION INSECURITY: IN THE PAST 12 MONTHS, HAS LACK OF TRANSPORTATION KEPT YOU FROM MEDICAL APPOINTMENTS OR FROM GETTING MEDICATIONS?: NO

## 2025-03-12 SDOH — ECONOMIC STABILITY: FOOD INSECURITY: WITHIN THE PAST 12 MONTHS, YOU WORRIED THAT YOUR FOOD WOULD RUN OUT BEFORE YOU GOT THE MONEY TO BUY MORE.: NEVER TRUE

## 2025-03-12 SDOH — SOCIAL STABILITY: SOCIAL INSECURITY
WITHIN THE LAST YEAR, HAVE YOU BEEN RAPED OR FORCED TO HAVE ANY KIND OF SEXUAL ACTIVITY BY YOUR PARTNER OR EX-PARTNER?: NO

## 2025-03-12 SDOH — SOCIAL STABILITY: SOCIAL INSECURITY
WITHIN THE LAST YEAR, HAVE YOU BEEN KICKED, HIT, SLAPPED, OR OTHERWISE PHYSICALLY HURT BY YOUR PARTNER OR EX-PARTNER?: NO

## 2025-03-12 SDOH — ECONOMIC STABILITY: HOUSING INSECURITY: AT ANY TIME IN THE PAST 12 MONTHS, WERE YOU HOMELESS OR LIVING IN A SHELTER (INCLUDING NOW)?: NO

## 2025-03-12 SDOH — ECONOMIC STABILITY: FOOD INSECURITY: WITHIN THE PAST 12 MONTHS, THE FOOD YOU BOUGHT JUST DIDN'T LAST AND YOU DIDN'T HAVE MONEY TO GET MORE.: NEVER TRUE

## 2025-03-12 SDOH — SOCIAL STABILITY: SOCIAL INSECURITY: ARE THERE ANY APPARENT SIGNS OF INJURIES/BEHAVIORS THAT COULD BE RELATED TO ABUSE/NEGLECT?: NO

## 2025-03-12 SDOH — SOCIAL STABILITY: SOCIAL INSECURITY
ASK PARENT OR GUARDIAN: ARE THERE TIMES WHEN YOU, YOUR CHILD(REN), OR ANY MEMBER OF YOUR HOUSEHOLD FEEL UNSAFE, HARMED, OR THREATENED AROUND PERSONS WITH WHOM YOU KNOW OR LIVE?: NO

## 2025-03-12 SDOH — ECONOMIC STABILITY: INCOME INSECURITY: IN THE PAST 12 MONTHS HAS THE ELECTRIC, GAS, OIL, OR WATER COMPANY THREATENED TO SHUT OFF SERVICES IN YOUR HOME?: NO

## 2025-03-12 SDOH — SOCIAL STABILITY: SOCIAL INSECURITY: HAVE YOU HAD ANY THOUGHTS OF HARMING ANYONE ELSE?: NO

## 2025-03-12 SDOH — ECONOMIC STABILITY: FOOD INSECURITY: HOW HARD IS IT FOR YOU TO PAY FOR THE VERY BASICS LIKE FOOD, HOUSING, MEDICAL CARE, AND HEATING?: NOT HARD AT ALL

## 2025-03-12 SDOH — ECONOMIC STABILITY: HOUSING INSECURITY: DO YOU FEEL UNSAFE GOING BACK TO THE PLACE WHERE YOU LIVE?: NO

## 2025-03-12 ASSESSMENT — COGNITIVE AND FUNCTIONAL STATUS - GENERAL
TOILETING: A LITTLE
WALKING IN HOSPITAL ROOM: A LITTLE
PATIENT BASELINE BEDBOUND: NO
STANDING UP FROM CHAIR USING ARMS: A LITTLE
CLIMB 3 TO 5 STEPS WITH RAILING: A LITTLE
TURNING FROM BACK TO SIDE WHILE IN FLAT BAD: A LITTLE
DAILY ACTIVITIY SCORE: 20
STANDING UP FROM CHAIR USING ARMS: A LITTLE
MOVING TO AND FROM BED TO CHAIR: A LITTLE
MOVING TO AND FROM BED TO CHAIR: A LITTLE
DRESSING REGULAR UPPER BODY CLOTHING: A LITTLE
HELP NEEDED FOR BATHING: A LITTLE
DRESSING REGULAR LOWER BODY CLOTHING: A LITTLE
WALKING IN HOSPITAL ROOM: A LITTLE
HELP NEEDED FOR BATHING: A LITTLE
MOBILITY SCORE: 19
TURNING FROM BACK TO SIDE WHILE IN FLAT BAD: A LITTLE
TOILETING: A LITTLE
DAILY ACTIVITIY SCORE: 22
MOBILITY SCORE: 19
CLIMB 3 TO 5 STEPS WITH RAILING: A LITTLE

## 2025-03-12 ASSESSMENT — PATIENT HEALTH QUESTIONNAIRE - PHQ9
2. FEELING DOWN, DEPRESSED OR HOPELESS: NOT AT ALL
SUM OF ALL RESPONSES TO PHQ9 QUESTIONS 1 & 2: 0
1. LITTLE INTEREST OR PLEASURE IN DOING THINGS: NOT AT ALL

## 2025-03-12 ASSESSMENT — PAIN - FUNCTIONAL ASSESSMENT
PAIN_FUNCTIONAL_ASSESSMENT: 0-10

## 2025-03-12 ASSESSMENT — ACTIVITIES OF DAILY LIVING (ADL)
GROOMING: INDEPENDENT
FEEDING YOURSELF: INDEPENDENT
TOILETING: INDEPENDENT
ASSISTIVE_DEVICE: WALKER
WALKS IN HOME: INDEPENDENT
DRESSING YOURSELF: INDEPENDENT
JUDGMENT_ADEQUATE_SAFELY_COMPLETE_DAILY_ACTIVITIES: YES
HEARING - RIGHT EAR: FUNCTIONAL
LACK_OF_TRANSPORTATION: NO
HEARING - LEFT EAR: FUNCTIONAL
BATHING: INDEPENDENT
PATIENT'S MEMORY ADEQUATE TO SAFELY COMPLETE DAILY ACTIVITIES?: YES
ADEQUATE_TO_COMPLETE_ADL: YES
LACK_OF_TRANSPORTATION: NO

## 2025-03-12 ASSESSMENT — PAIN SCALES - GENERAL
PAINLEVEL_OUTOF10: 0 - NO PAIN
PAINLEVEL_OUTOF10: 4
PAINLEVEL_OUTOF10: 4
PAINLEVEL_OUTOF10: 0 - NO PAIN
PAINLEVEL_OUTOF10: 0 - NO PAIN
PAINLEVEL_OUTOF10: 3
PAINLEVEL_OUTOF10: 2
PAIN_LEVEL: 0
PAINLEVEL_OUTOF10: 2

## 2025-03-12 ASSESSMENT — PAIN DESCRIPTION - DESCRIPTORS
DESCRIPTORS: ACHING
DESCRIPTORS: SORE
DESCRIPTORS: ACHING;BURNING

## 2025-03-12 ASSESSMENT — ENCOUNTER SYMPTOMS
GASTROINTESTINAL NEGATIVE: 1
EYES NEGATIVE: 1
CONSTITUTIONAL NEGATIVE: 1
CARDIOVASCULAR NEGATIVE: 1
RESPIRATORY NEGATIVE: 1

## 2025-03-12 ASSESSMENT — LIFESTYLE VARIABLES
HOW MANY STANDARD DRINKS CONTAINING ALCOHOL DO YOU HAVE ON A TYPICAL DAY: PATIENT DOES NOT DRINK
AUDIT-C TOTAL SCORE: 0
AUDIT-C TOTAL SCORE: 0
HOW OFTEN DO YOU HAVE A DRINK CONTAINING ALCOHOL: NEVER
SKIP TO QUESTIONS 9-10: 1
HOW OFTEN DO YOU HAVE 6 OR MORE DRINKS ON ONE OCCASION: NEVER

## 2025-03-12 NOTE — CONSULTS
Consults    Reason For Consult  Benign essential hypertension, osteoarthritis    History Of Present Illness  Mitra Jeffries is a 74 y.o. female presenting with status post left total knee replacement the patient is admitted to the orthopedics floor postoperatively.  Consulted by Dr. Mills for benign essential hypertension and osteoarthritis.  The patient reports her medical problems are controlled with her present medications.  She reports her blood pressures have been normal.  She denies history of diabetes, heart disease or lung disease.  She is compliant with her diet and medications.     Past Medical History  She has a past medical history of Dermatitis (2023), Diverticulosis of intestine, part unspecified, without perforation or abscess without bleeding (03/10/2014), Dizziness, Hypertension, Personal history of (healed) traumatic fracture (03/10/2014), Personal history of other diseases of the nervous system and sense organs (03/10/2014), Personal history of other venous thrombosis and embolism (2019), Personal history of pulmonary embolism (2020), Personal history of urinary (tract) infections (03/10/2014), Serous otitis media (2023), Sinusitis (2023), and Vertigo.    Surgical History  She has a past surgical history that includes  section, classic (2013); Cholecystectomy (2013); Appendectomy (03/10/2014); Tonsillectomy (03/10/2014); Total knee arthroplasty (2018); Other surgical history (2016); and Other surgical history (2016).     Social History  She reports that she has quit smoking. Her smoking use included cigarettes. She has never been exposed to tobacco smoke. She has never used smokeless tobacco. She reports that she does not use drugs. No history on file for alcohol use.    Family History  No family history on file.     Allergies  Codeine, Penicillins, Shellfish containing products, and Sulfa (sulfonamide antibiotics)    Review of  Systems   Constitutional: Negative.    HENT: Negative.     Eyes: Negative.    Respiratory: Negative.     Cardiovascular: Negative.    Gastrointestinal: Negative.    All other systems reviewed and are negative.       Physical Exam  Constitutional:       Appearance: Normal appearance.   HENT:      Head: Normocephalic and atraumatic.      Nose: Nose normal.      Mouth/Throat:      Mouth: Mucous membranes are moist.      Pharynx: Oropharynx is clear.   Eyes:      Extraocular Movements: Extraocular movements intact.      Conjunctiva/sclera: Conjunctivae normal.      Pupils: Pupils are equal, round, and reactive to light.   Cardiovascular:      Rate and Rhythm: Normal rate and regular rhythm.      Pulses: Normal pulses.      Heart sounds: Normal heart sounds.   Pulmonary:      Effort: Pulmonary effort is normal.      Breath sounds: Normal breath sounds.   Abdominal:      General: Abdomen is flat. Bowel sounds are normal.      Palpations: Abdomen is soft.      Tenderness: There is no abdominal tenderness.   Musculoskeletal:         General: Normal range of motion.      Cervical back: Normal range of motion and neck supple.   Skin:     General: Skin is warm and dry.   Neurological:      General: No focal deficit present.      Mental Status: She is alert and oriented to person, place, and time.   Psychiatric:         Mood and Affect: Mood normal.         Behavior: Behavior normal.         Thought Content: Thought content normal.          Last Recorded Vitals  /69 (BP Location: Left arm, Patient Position: Lying)   Pulse 92   Temp 36.6 °C (97.9 °F) (Temporal)   Resp 16   Wt 85.9 kg (189 lb 6 oz)   SpO2 94%     Relevant Results  No results found for this or any previous visit (from the past 96 hours).      Assessment/Plan   Osteoarthritis left knee  The patient is status post left knee arthroplasty.  Postoperative plan including DVT prophylaxis, pain medications, physical therapy and Occupational Therapy per  orthopedic surgery.  Benign essential hypertension  Blood pressures adequately controlled postoperatively.  The present medications are continued.  Vital signs will be monitored and medications adjusted as indicated.      Rodrigo Pacheco MD

## 2025-03-12 NOTE — NURSING NOTE
Ambulating to bedside commode with walker.Gait slightly unsteady.Voided small amount clear yellow urine. Fluids encouraged.

## 2025-03-12 NOTE — NURSING NOTE
Resting in bed with call light within reach and bed alarm on.   at bedside.  Voices no c/o pain at this time.  Ice pack to left knee for comfort.  Mepilex AG dressing intact with small amount of old bloody drainage noted.  Ace wrap is clean, dry, and intact.  Respirations even and unlabored on 2L nasal canula.

## 2025-03-12 NOTE — ANESTHESIA PROCEDURE NOTES
Spinal Block    Patient location during procedure: OR  Start time: 3/12/2025 11:46 AM  End time: 3/12/2025 12:10 PM  Reason for block: primary anesthetic  Staffing  Performed: attending   Authorized by: Magda Healy MD    Performed by: LILIAM Wright    Preanesthetic Checklist  Completed: patient identified, IV checked, risks and benefits discussed, surgical consent, pre-op evaluation, timeout performed and sterile techniques followed  Block Timeout  RN/Licensed healthcare professional reads aloud to the Anesthesia provider and entire team: Patient identity, procedure with side and site, patient position, and as applicable the availability of implants/special equipment/special requirements.    Timeout performed at: 3/12/2025 11:45 AM  Spinal Block  Patient position: sitting  Prep: Betadine  Sterility prep: cap, drape, gloves, hand hygiene and mask  Sedation level: no sedation  Patient monitoring: continuous pulse oximetry and blood pressure  Approach: midline  Vertebral space: L3-4  Injection technique: single-shot  Needle  Needle type: pencil-point   Needle gauge: 24 G  Needle length: 4 in  Free flowing CSF: yes    Assessment bilateral  Block outcome: patient comfortable  Procedure assessment: patient tolerated procedure well with no immediate complications  Additional Notes  Multiple attempts by SG, HR.  Free flowing CSF obtained.    Spinal kit expiration 06-30-26

## 2025-03-12 NOTE — PROGRESS NOTES
74 yr old female admitted extended recovery following left knee replacement with Dr. Mills.  Plan is home tomorrow with [facility] PT. SOC pending confirmation for 3/14/25.  Post op follow up confirmed on 3/31/25 at 1pm-Anitra  Spouse to transport home and assist with care as needed.   03/12/25 0697   Discharge Planning   Living Arrangements Spouse/significant other   Support Systems Spouse/significant other   Assistance Needed transportation   Type of Residence Private residence   Number of Stairs to Enter Residence 0   Number of Stairs Within Residence 0   Do you have animals or pets at home? No   Who is requesting discharge planning? Provider   Home or Post Acute Services In home services   Type of Home Care Services Home PT   Expected Discharge Disposition Home H  ( Home Care)   Does the patient need discharge transport arranged? No   Financial Resource Strain   How hard is it for you to pay for the very basics like food, housing, medical care, and heating? Not hard   Housing Stability   In the last 12 months, was there a time when you were not able to pay the mortgage or rent on time? N   In the past 12 months, how many times have you moved where you were living? 0   At any time in the past 12 months, were you homeless or living in a shelter (including now)? N   Transportation Needs   In the past 12 months, has lack of transportation kept you from medical appointments or from getting medications? no   In the past 12 months, has lack of transportation kept you from meetings, work, or from getting things needed for daily living? No   Patient Choice   Provider Choice list and CMS website (https://medicare.gov/care-compare#search) for post-acute Quality and Resource Measure Data were provided and reviewed with: Patient   Patient / Family choosing to utilize agency / facility established prior to hospitalization No   Stroke Family Assessment   Stroke Family Assessment Needed No

## 2025-03-12 NOTE — ANESTHESIA POSTPROCEDURE EVALUATION
Patient: Mitra Jeffries    Procedure Summary       Date: 03/12/25 Room / Location: [facility] 04 / Virtual [facility]    Anesthesia Start: 1140 Anesthesia Stop: 1514    Procedure: ARTHROPLASTY, KNEE, TOTAL, USING ROBOT-ASSISTED NAVIGATION *Overnight ** (Left) Diagnosis:       Arthritis of left knee      (Arthritis of left knee [M17.12])    Surgeons: Cristofer Mills MD Responsible Provider: Eladia Gibbons MD MPH    Anesthesia Type: regional, spinal ASA Status: 3            Anesthesia Type: regional, spinal    Vitals Value Taken Time   /67 03/12/25 1520   Temp 36.3 °C (97.3 °F) 03/12/25 1506   Pulse 100 03/12/25 1535   Resp 14 03/12/25 1520   SpO2 94 % 03/12/25 1520       Anesthesia Post Evaluation    Patient location during evaluation: PACU  Patient participation: complete - patient participated  Level of consciousness: awake and alert  Pain score: 0  Pain management: adequate  Multimodal analgesia pain management approach  Airway patency: patent  Two or more strategies used to mitigate risk of obstructive sleep apnea  Cardiovascular status: acceptable  Respiratory status: acceptable  Hydration status: acceptable  Postoperative Nausea and Vomiting: none    No notable events documented.

## 2025-03-12 NOTE — PROGRESS NOTES
S: HIEU since OR. Still somnolent in PACU. Pain controlled. Family updated by me personally, pleased with care.          O  VS:  Temp:  [36.3 °C (97.3 °F)-36.6 °C (97.9 °F)] 36.3 °C (97.3 °F)  Heart Rate:  [] 88  Resp:  [16-20] 18  BP: (121-150)/(62-82) 132/62    Gen: NAD  Focused exam of operative extremity:  -Dressing with minor stains  -SILT in S/S/SP/DP/T/F distributions  -Able to flex Q, TA, GS  -Palpable DP pulse, symmetric to contralateral  -Thigh high TEDs, SCDs in place    Drain  Na  Micro  Na  PT/OT  Pending  Labs (pertinent)  In AM  Imaging  L Knee radiographs (AP/lat): TKA components in acceptable position with no sign of gross implant/hardware failure.          A&P: Mitra Jeffries is a 74 y.o. female s/p L primary TKA on 3/12/2025.  Abx prophylaxis (does not need to stay for postop dose if otherwise ready to DC)  Activity: WBAT.  Analgesia: Multimodal with breakthrough   Consults: Appreciate therapy, CORNELIO, case mgmt, IM  Diet: Routine. DC IVF when adequate PO intake  Dressing: Aquacel/Mepilex for 7d. Patient will remove at home then apply daily dry dressing PRN  DVT ppx: Early mobilization, SCDs, pharmacologic  BLE swelling: Compression hose  DC: Pending PT/OT

## 2025-03-12 NOTE — ANESTHESIA PREPROCEDURE EVALUATION
Patient: Mitra Jeffries    Procedure Information       Date/Time: 25 1110    Procedure: ARTHROPLASTY, KNEE, TOTAL, USING ROBOT-ASSISTED NAVIGATION *Overnight ** (Left) - Adductor canal block, IPACK    Location: [facility] / [facility]    Surgeons: Cristofer Mills MD            Relevant Problems   Cardiac   (+) Hyperlipidemia   (+) Hypertension      Neuro   (+) Anxiety      Endocrine   (+) Obesity      Musculoskeletal   (+) Primary osteoarthritis of left knee   (+) Primary osteoarthritis of right knee     Past Medical History:   Diagnosis Date    Dermatitis 2023    Diverticulosis of intestine, part unspecified, without perforation or abscess without bleeding 03/10/2014    Diverticulosis    Personal history of (healed) traumatic fracture 03/10/2014    History of fracture of toe    Personal history of other diseases of the nervous system and sense organs 03/10/2014    History of migraine    Personal history of other venous thrombosis and embolism 2019    History of deep vein thrombosis (DVT) of lower extremity    Personal history of pulmonary embolism 2020    History of pulmonary embolism    Personal history of urinary (tract) infections 03/10/2014    History of cystitis    Serous otitis media 2023    Sinusitis 2023     Per cardiology note: Echocardiogram 2014: normal left ventricular systolic function with mild concentric left ventricular hypertrophy   Cleared by Dr. Le cardiology, will follow up after procedure    Past Surgical History:   Procedure Laterality Date    APPENDECTOMY  03/10/2014    Appendectomy     SECTION, CLASSIC  2013     Section    CHOLECYSTECTOMY  2013    Cholecystectomy    OTHER SURGICAL HISTORY  2016    Closed Treatment Of Fractured Toe    OTHER SURGICAL HISTORY  2016    Arthrocentesis Injection Of Knee Joint    TONSILLECTOMY  03/10/2014    Tonsillectomy With Adenoidectomy    TOTAL KNEE ARTHROPLASTY   "01/13/2018    Total Knee Replacement Right     Allergies   Allergen Reactions    Codeine Unknown    Penicillins Unknown    Shellfish Containing Products Unknown    Sulfa (Sulfonamide Antibiotics) Unknown     Lab Results   Component Value Date    WBC 6.8 02/19/2025    HGB 14.8 02/19/2025    HCT 43.9 02/19/2025    MCV 86 02/19/2025     02/19/2025     Lab Results   Component Value Date    GLUCOSE 93 02/19/2025    CALCIUM 10.1 02/19/2025     02/19/2025    K 4.4 02/19/2025    CO2 29 02/19/2025     02/19/2025    BUN 17 02/19/2025    CREATININE 0.52 02/19/2025     No results found for: \"INR\", \"PROTIME\"    Clinical information reviewed:                   NPO Detail:  No data recorded     Physical Exam    Airway  Mallampati: II  TM distance: >3 FB  Neck ROM: full     Cardiovascular    Dental    Pulmonary    Abdominal            Anesthesia Plan    History of general anesthesia?: yes  History of complications of general anesthesia?: no    ASA 3     regional and spinal     intravenous induction   Postoperative administration of opioids is intended.  Anesthetic plan and risks discussed with patient.  Use of blood products discussed with patient who.      "

## 2025-03-12 NOTE — PERIOPERATIVE NURSING NOTE
Monitor alarming some PVC's, two and a run.  New monitor lead applied with clearer reading.  Appears NSR with BBB.  Dr. Barker at bedside to view as was alarming run V-tach.

## 2025-03-12 NOTE — NURSING NOTE
Admitted to room 202 from Pacu following left knee surgery.Dressing intact with no noted drainage.Ice packs maintained. 2+ pedal pulse.Denies pain at present. Family at bedside.

## 2025-03-12 NOTE — PROGRESS NOTES
S: HIEU since OR. Still somnolent in PACU. Pain controlled. Sister updated by me personally, pleased with care.          O  VS:  Temp:  [36.6 °C (97.9 °F)] 36.6 °C (97.9 °F)  Heart Rate:  [] 81  Resp:  [16-20] 19  BP: (121-150)/(62-82) 132/62    Gen: NAD  Focused exam of operative extremity:  -Dressing with minor stains  -SILT in S/S/SP/DP/T/F distributions  -Able to flex Q, TA, GS  -Palpable DP pulse, symmetric to contralateral  -Thigh high TEDs, SCDs in place    Drain  Na  Micro  Na  PT/OT  Pending  Labs (pertinent)  In AM  Imaging  R Knee radiographs (AP/lat): TKA components in acceptable position with no sign of gross implant/hardware failure.          A&P: Mitra Jeffries is a 74 y.o. female s/p R Primary TKA on 3/12/2025.  Abx prophylaxis (does not need to stay for postop dose if otherwise ready to DC)  Activity: WBAT.  Analgesia: Multimodal with breakthrough   Consults: Appreciate therapy, CORNELIO, case mgmt, IM  Diet: Routine. DC IVF when adequate PO intake  Dressing: Aquacel/Mepilex for 7d. Patient will remove at home then apply daily dry dressing PRN  DVT ppx: Early mobilization, SCDs, pharmacologic  BLE swelling: Compression hose  DC: Pending PT/O

## 2025-03-12 NOTE — OP NOTE
ARTHROPLASTY, KNEE, TOTAL, (L) Operative Note     Date: 3/12/2025  OR Location: [facility]    Name: Mitra Jeffries, : 1950, Age: 74 y.o., MRN: [id number], Sex: female    Diagnosis  Pre-op Diagnosis      * Arthritis of left knee [M17.12] Post-op Diagnosis     * Arthritis of left knee [M17.12]     Procedures  ARTHROPLASTY, KNEE, TOTAL, USING ROBOT-ASSISTED NAVIGATION *Overnight **  46531 - FL ARTHRP KNE CONDYLE&PLATU MEDIAL&LAT COMPARTMENTS      Surgeons      * Cristofer Mills - Primary    Resident/Fellow/Other Assistant:  Surgeons and Role:  * No surgeons found with a matching role *    Staff:   Circulator: Abimbola  Scrub Person: Dorinda  Scrub Person: Preet Talavera Circulator: Tammy  Relief Scrub: Tammy  Relief Scrub: Carol    Anesthesia Staff: Anesthesiologist: Eladia Gibbons MD MPH; Magda Healy MD  CRNA: LILIAM Mcrae, DNP; LILIAM Wright    Procedure Summary  Anesthesia: Regional, Spinal  ASA: III  Estimated Blood Loss: 100mL  Intra-op Medications:   Administrations occurring from 1110 to 1540 on 25:   Medication Name Total Dose   ropivacaine-epinephrine-clonidine-ketorolac 2.46-0.005- 0.0008-0.3mg/mL periarticular syringe 50 mL   ceFAZolin (Ancef) 2 g in dextrose (iso)  mL 2 g   bupivacaine PF 0.75 %-dextrose 8.25 % (Sensorcaine) intrathecal 1.4 mL   dexAMETHasone (Decadron) injection 4 mg/mL 4 mg   fentaNYL (Sublimaze) injection 50 mcg/mL 50 mcg   ketorolac (Toradol) injection 30 mg 15 mg   LR infusion Cannot be calculated   lidocaine PF (Xylocaine-MPF) local injection 1 % 5 mL   midazolam (Versed) injection 1 mg/mL 2 mg   ondansetron (Zofran) 2 mg/mL injection 4 mg   phenylephrine 100 mcg/mL syringe 10 mL (prefilled) 1,600 mcg   propofol (Diprivan) injection 10 mg/mL 1,200 mg   tranexamic acid (Cyklokapron) injection 2,000 mg              Anesthesia Record               Intraprocedure I/O Totals          Intake    Tranexamic Acid 0.00 mL    The total shown is the total  volume documented since Anesthesia Start was filed.    LR infusion 1300.00 mL    Total Intake 1300 mL       Output    Est. Blood Loss 100 mL    Total Output 100 mL       Net    Net Volume 1200 mL          Specimen: No specimens collected              Drains and/or Catheters: * None in log *    Tourniquet Times:     Total Tourniquet Time Documented:  Thigh (Left) - 90 minutes  Total: Thigh (Left) - 90 minutes      Implants:  Implant Name Type Inv. Item Serial No.  Lot No. LRB No. Used Action   CEMENT, BONE, SIMPLEX HV FULL DOSE W/GENTAMYCIN 40 GM - [id number] Joint CEMENT, BONE, SIMPLEX HV FULL DOSE W/GENTAMYCIN 40 GM  [employer] [id number] Left 2 Implanted   BASEPLATE, TIBIA 3 TS TRIATH - [id number] Joint BASEPLATE, TIBIA 3 TS TRIATH  [employer] [id number] Left 1 Implanted   FEM COMP, TRIATH CR SZ3 LEFT - [id number] Joint FEM COMP, TRIATH CR SZ3 LEFT  [employer] [id number] Left 1 Implanted   PATELLA, TRIATHLON, ASYMMETRIC X3, SZ-A32 10MM - [id number] Joint PATELLA, TRIATHLON, ASYMMETRIC X3, SZ-A32 10MM  [employer] [id number] Left 1 Implanted   INSERT, TIBIAL, X3 TRIATHLON CS, SZ-3 11MM - [id number] Joint INSERT, TIBIAL, X3 TRIATHLON CS, SZ-3 11MM  [employer] [id number] Left 1 Implanted       Physician Narrative  Preoperative Diagnosis: Left Knee Degenerative Joint Disease  Postoperative Diagnosis: Left Knee Degenerative Joint Disease  Procedure: Left Total Knee Arthroplasty   Surgical Approach: Medial Parapatellar    Task performed by RFNA or Surgical Assistant  Positioning, retraction, closure.    Special Considerations  None    Findings  End stage left knee degenerative joint disease as seen on preoperative imaging.    Preoperative Course  Please see clinic/consult notes for full discussion of history, indications, and treatment alternatives. Briefly, the patient had end stage left knee degenerative joint disease causing significant symptoms. Unfortunately, conservative  options had been exhausted and did not provide satisfactory pain and function levels. Ultimately, the patient and I made a shared decision to proceed with surgery.    Procedure  A first assistant actively participated and was necessary for one or more of the following: opening, exposure and visualization during the case, maintaining hemostasis, wound closure resulting in its safe and expeditious completion. I was present for the entire procedure. The patient was identified in the preoperative area. The operative site was marked, informed consent reviewed, and operative procedure confirmed. The patient was taken to the operating room and anesthesia achieved. The patient was positioned on the surgical table with bony prominences well-padded. The operative extremity was sterilely prepped and draped. A surgical time-out was performed to confirm the patient, site, procedure, and administration of TXA and prophylactic antibiotics. New gloves were donned after draping and prior to closure, at which time a fresh closing tray with clean instruments was used. An additional dose of TXA was given when closing the wound.     The leg was exsanguinated and tourniquet inflated. A midline longitudinal incision was made and, with a new scalpel, carried down to the extensor mechanism. Full thickness skin flaps were raised, and a medial parapatellar arthrotomy performed. A full thickness medial release was performed, supracondylar synovium removed, lateral plica released, infrapatellar fat pad excised, and femoral origin of the ACL and PCL released. With exposure established, end stage arthritic changes were confirmed.    Attention was directed to the patella. Thickness was measured, resection performed, thickness verified, and protector placed.    Femoral and tibial arrays were attached intra-incisional and extra-incisional, respectively. Hip center was obtained, checkpoints were registered, and the anatomy was registered to the  preoperative CT scan. Native alignment was measured. Osteophytes were removed and releases performed to enable alignment correction. Medial and lateral extension and flexion gaps were obtained. Bone cuts were planned.     The robotic arm was brought into the field. The distal femur cut was made and checked. The remainder of the femoral cuts were made. The tibial cut was made and checked. A spacer block was used to confirm preliminary alignment and gaps, adjusting cuts as needed.  The medial meniscus, lateral meniscus, and posterior osteophytes were removed. The periarticular injection was injected in the posteromedial knee.    The patella was sized and lug holes drilled, with a caliper confirming a final thickness equal to the native patella.    Trial components were inserted. Patellar tracking and full extension and stability in both extension and 90° flexion were confirmed. The tibia was floated during this and resting position marked. Femoral lug holes were drilled. Trial components were removed.    The tibia was sized and prepared, including interdigitation of sclerotic areas. New gloves were donned. The knee was extensively irrigated with saline, and cancellous bone surfaces were dried. Antibiotic HV cement was mixed. The tibial and femoral components were cemented in place and compressed with a trial polyethylene. The leg was held in extension, and the patella was cemented in and held with a clamp.    The wound was soaked with 1 liter of dilute povidone iodine. The tourniquet was deflated and hemostasis obtained. The betadine soak was irrigated with saline. The final polyethylene size was trialed and inserted. Arrays, checkpoints, and pins were removed after final alignment was checked. The tibial insertion sites were closed with 2-0 suture and 3-0 nylon. The knee was partially flexed and closed in layers (capsule, additional superficial irrigation, deep dermal tissues, subcuticular running suture, and mesh  with skin glue).    A sterile dressing and thigh high SURYA hose were applied. Anesthesia was concluded. The patient was transferred to the stretcher and recovery room in stable condition.     Information and Plan  Nerve block: Spinal, adductor canal, IPACK  Complications: None apparent  Weightbearing: As tolerated  Disposition: PACU

## 2025-03-12 NOTE — CARE PLAN
The patient's goals for the shift include pain management    The clinical goals for the shift include improved mobility

## 2025-03-12 NOTE — PERIOPERATIVE NURSING NOTE
Pt doing well in recovery. Pt toletates po well. No c/o  ponv. Pt with mild  pain at surgical site and lower back but improved with positioning. Good movement and sensation down to ankles bilateral sides. Report called to the floor. Pt ready to transfer.    Patient has no objection to blood transfusions.

## 2025-03-12 NOTE — ANESTHESIA PROCEDURE NOTES
Peripheral Block    Patient location during procedure: pre-op  Start time: 3/12/2025 10:29 AM  End time: 3/12/2025 10:33 AM  Reason for block: at surgeon's request and post-op pain management  Staffing  Performed: attending   Authorized by: Magda Healy MD    Performed by: Osvaldo Barker MD  Preanesthetic Checklist  Completed: patient identified, IV checked, site marked, risks and benefits discussed, surgical consent, monitors and equipment checked, pre-op evaluation and timeout performed   Timeout performed at: 3/12/2025 10:21 AM  Peripheral Block  Patient position: laying flat  Prep: ChloraPrep  Patient monitoring: heart rate, cardiac monitor and continuous pulse ox  Block type: adductor canal  Laterality: left  Injection technique: single-shot  Guidance: ultrasound guided  Needle  Needle gauge: 21 G  Needle length: 10 cm  Needle localization: ultrasound guidance  Test dose: negative  Assessment  Injection assessment: negative aspiration for heme, no paresthesia on injection, incremental injection and local visualized surrounding nerve on ultrasound  Paresthesia pain: none  Heart rate change: no  Slow fractionated injection: yes  Additional Notes  Ropivicaine 20 ml 0.5 % with 5 mg Decadron

## 2025-03-12 NOTE — ANESTHESIA PROCEDURE NOTES
Peripheral Block    Patient location during procedure: pre-op  Start time: 3/12/2025 10:27 AM  End time: 3/12/2025 10:28 AM  Reason for block: at surgeon's request and post-op pain management  Staffing  Performed: attending   Authorized by: Magda Healy MD    Performed by: Osvaldo Barker MD  Preanesthetic Checklist  Completed: patient identified, IV checked, site marked, risks and benefits discussed, surgical consent, monitors and equipment checked, pre-op evaluation and timeout performed   Timeout performed at: 3/12/2025 10:21 AM  Peripheral Block  Patient position: laying flat  Prep: ChloraPrep  Patient monitoring: heart rate, cardiac monitor and continuous pulse ox  Block type: IPACK  Laterality: left  Injection technique: single-shot  Guidance: ultrasound guided  Needle  Needle gauge: 21 G  Needle length: 10 cm  Needle localization: ultrasound guidance  Test dose: negative  Assessment  Injection assessment: negative aspiration for heme, no paresthesia on injection, incremental injection and local visualized surrounding nerve on ultrasound  Paresthesia pain: none  Heart rate change: no  Slow fractionated injection: yes  Additional Notes  Ropivicaine 15 ml 0.5 % with 5 mg Decadron

## 2025-03-13 ENCOUNTER — DOCUMENTATION (OUTPATIENT)
Dept: HOME HEALTH SERVICES | Facility: HOME HEALTH | Age: 75
End: 2025-03-13
Payer: MEDICARE

## 2025-03-13 VITALS
WEIGHT: 189.38 LBS | OXYGEN SATURATION: 93 % | HEART RATE: 77 BPM | DIASTOLIC BLOOD PRESSURE: 65 MMHG | HEIGHT: 62 IN | TEMPERATURE: 98.2 F | RESPIRATION RATE: 16 BRPM | BODY MASS INDEX: 34.85 KG/M2 | SYSTOLIC BLOOD PRESSURE: 122 MMHG

## 2025-03-13 PROBLEM — E87.6 HYPOKALEMIA: Status: ACTIVE | Noted: 2025-03-13

## 2025-03-13 PROBLEM — D62 ACUTE POSTOPERATIVE ANEMIA DUE TO EXPECTED BLOOD LOSS: Status: ACTIVE | Noted: 2025-03-13

## 2025-03-13 LAB
ANION GAP SERPL CALC-SCNC: 15 MMOL/L (ref 10–20)
BUN SERPL-MCNC: 18 MG/DL (ref 6–23)
CALCIUM SERPL-MCNC: 9.7 MG/DL (ref 8.6–10.3)
CHLORIDE SERPL-SCNC: 100 MMOL/L (ref 98–107)
CO2 SERPL-SCNC: 25 MMOL/L (ref 21–32)
CREAT SERPL-MCNC: 0.54 MG/DL (ref 0.5–1.05)
EGFRCR SERPLBLD CKD-EPI 2021: >90 ML/MIN/1.73M*2
ERYTHROCYTE [DISTWIDTH] IN BLOOD BY AUTOMATED COUNT: 12.7 % (ref 11.5–14.5)
GLUCOSE SERPL-MCNC: 167 MG/DL (ref 74–99)
HCT VFR BLD AUTO: 40.9 % (ref 36–46)
HGB BLD-MCNC: 13.8 G/DL (ref 12–16)
MCH RBC QN AUTO: 28.9 PG (ref 26–34)
MCHC RBC AUTO-ENTMCNC: 33.7 G/DL (ref 32–36)
MCV RBC AUTO: 86 FL (ref 80–100)
NRBC BLD-RTO: ABNORMAL /100{WBCS}
PLATELET # BLD AUTO: 352 X10*3/UL (ref 150–450)
POTASSIUM SERPL-SCNC: 3.3 MMOL/L (ref 3.5–5.3)
RBC # BLD AUTO: 4.77 X10*6/UL (ref 4–5.2)
SODIUM SERPL-SCNC: 137 MMOL/L (ref 136–145)
WBC # BLD AUTO: 13.8 X10*3/UL (ref 4.4–11.3)

## 2025-03-13 PROCEDURE — 97110 THERAPEUTIC EXERCISES: CPT | Mod: GP

## 2025-03-13 PROCEDURE — 94760 N-INVAS EAR/PLS OXIMETRY 1: CPT

## 2025-03-13 PROCEDURE — 2500000002 HC RX 250 W HCPCS SELF ADMINISTERED DRUGS (ALT 637 FOR MEDICARE OP, ALT 636 FOR OP/ED): Mod: MUE | Performed by: STUDENT IN AN ORGANIZED HEALTH CARE EDUCATION/TRAINING PROGRAM

## 2025-03-13 PROCEDURE — 2500000005 HC RX 250 GENERAL PHARMACY W/O HCPCS: Performed by: STUDENT IN AN ORGANIZED HEALTH CARE EDUCATION/TRAINING PROGRAM

## 2025-03-13 PROCEDURE — 7100000011 HC EXTENDED STAY RECOVERY HOURLY - NURSING UNIT

## 2025-03-13 PROCEDURE — 2500000001 HC RX 250 WO HCPCS SELF ADMINISTERED DRUGS (ALT 637 FOR MEDICARE OP): Performed by: STUDENT IN AN ORGANIZED HEALTH CARE EDUCATION/TRAINING PROGRAM

## 2025-03-13 PROCEDURE — 2500000002 HC RX 250 W HCPCS SELF ADMINISTERED DRUGS (ALT 637 FOR MEDICARE OP, ALT 636 FOR OP/ED): Performed by: INTERNAL MEDICINE

## 2025-03-13 PROCEDURE — 97116 GAIT TRAINING THERAPY: CPT | Mod: GP

## 2025-03-13 PROCEDURE — 80048 BASIC METABOLIC PNL TOTAL CA: CPT | Performed by: STUDENT IN AN ORGANIZED HEALTH CARE EDUCATION/TRAINING PROGRAM

## 2025-03-13 PROCEDURE — 2500000001 HC RX 250 WO HCPCS SELF ADMINISTERED DRUGS (ALT 637 FOR MEDICARE OP): Performed by: HOSPITALIST

## 2025-03-13 PROCEDURE — 36415 COLL VENOUS BLD VENIPUNCTURE: CPT | Performed by: STUDENT IN AN ORGANIZED HEALTH CARE EDUCATION/TRAINING PROGRAM

## 2025-03-13 PROCEDURE — 85027 COMPLETE CBC AUTOMATED: CPT | Performed by: STUDENT IN AN ORGANIZED HEALTH CARE EDUCATION/TRAINING PROGRAM

## 2025-03-13 PROCEDURE — 9420000001 HC RT PATIENT EDUCATION 5 MIN

## 2025-03-13 PROCEDURE — 97161 PT EVAL LOW COMPLEX 20 MIN: CPT | Mod: GP

## 2025-03-13 PROCEDURE — 2500000004 HC RX 250 GENERAL PHARMACY W/ HCPCS (ALT 636 FOR OP/ED): Performed by: STUDENT IN AN ORGANIZED HEALTH CARE EDUCATION/TRAINING PROGRAM

## 2025-03-13 PROCEDURE — 99233 SBSQ HOSP IP/OBS HIGH 50: CPT | Performed by: INTERNAL MEDICINE

## 2025-03-13 RX ORDER — POTASSIUM CHLORIDE 20 MEQ/1
40 TABLET, EXTENDED RELEASE ORAL ONCE
Status: COMPLETED | OUTPATIENT
Start: 2025-03-13 | End: 2025-03-13

## 2025-03-13 RX ADMIN — PANTOPRAZOLE SODIUM 40 MG: 40 TABLET, DELAYED RELEASE ORAL at 06:00

## 2025-03-13 RX ADMIN — APIXABAN 2.5 MG: 2.5 TABLET, FILM COATED ORAL at 08:29

## 2025-03-13 RX ADMIN — CEFADROXIL 500 MG: 500 CAPSULE ORAL at 08:29

## 2025-03-13 RX ADMIN — KETOROLAC TROMETHAMINE 15 MG: 15 INJECTION, SOLUTION INTRAMUSCULAR; INTRAVENOUS at 08:29

## 2025-03-13 RX ADMIN — Medication 2 L/MIN: at 09:25

## 2025-03-13 RX ADMIN — POLYETHYLENE GLYCOL 3350 17 G: 17 POWDER, FOR SOLUTION ORAL at 08:30

## 2025-03-13 RX ADMIN — Medication 21 PERCENT: at 09:20

## 2025-03-13 RX ADMIN — AMLODIPINE BESYLATE 10 MG: 5 TABLET ORAL at 08:29

## 2025-03-13 RX ADMIN — KETOROLAC TROMETHAMINE 15 MG: 15 INJECTION, SOLUTION INTRAMUSCULAR; INTRAVENOUS at 02:51

## 2025-03-13 RX ADMIN — DOCUSATE SODIUM 100 MG: 100 CAPSULE, LIQUID FILLED ORAL at 08:29

## 2025-03-13 RX ADMIN — TRANEXAMIC ACID 1950 MG: 650 TABLET ORAL at 05:59

## 2025-03-13 RX ADMIN — HYDROCHLOROTHIAZIDE 25 MG: 25 TABLET ORAL at 08:29

## 2025-03-13 RX ADMIN — CEFAZOLIN SODIUM 2 G: 2 INJECTION, SOLUTION INTRAVENOUS at 03:01

## 2025-03-13 RX ADMIN — OXYCODONE 5 MG: 5 TABLET ORAL at 09:49

## 2025-03-13 RX ADMIN — PREGABALIN 75 MG: 75 CAPSULE ORAL at 08:30

## 2025-03-13 RX ADMIN — Medication 21 PERCENT: at 12:55

## 2025-03-13 RX ADMIN — POTASSIUM CHLORIDE 40 MEQ: 1500 TABLET, EXTENDED RELEASE ORAL at 09:50

## 2025-03-13 ASSESSMENT — COGNITIVE AND FUNCTIONAL STATUS - GENERAL
MOBILITY SCORE: 21
MOVING TO AND FROM BED TO CHAIR: A LITTLE
CLIMB 3 TO 5 STEPS WITH RAILING: A LITTLE
STANDING UP FROM CHAIR USING ARMS: A LITTLE

## 2025-03-13 ASSESSMENT — PAIN DESCRIPTION - LOCATION: LOCATION: KNEE

## 2025-03-13 ASSESSMENT — PAIN - FUNCTIONAL ASSESSMENT
PAIN_FUNCTIONAL_ASSESSMENT: 0-10
PAIN_FUNCTIONAL_ASSESSMENT: UNABLE TO SELF-REPORT
PAIN_FUNCTIONAL_ASSESSMENT: UNABLE TO SELF-REPORT
PAIN_FUNCTIONAL_ASSESSMENT: 0-10
PAIN_FUNCTIONAL_ASSESSMENT: 0-10

## 2025-03-13 ASSESSMENT — PAIN SCALES - GENERAL
PAINLEVEL_OUTOF10: 5 - MODERATE PAIN
PAINLEVEL_OUTOF10: 0 - NO PAIN
PAINLEVEL_OUTOF10: 5 - MODERATE PAIN
PAINLEVEL_OUTOF10: 5 - MODERATE PAIN
PAINLEVEL_OUTOF10: 0 - NO PAIN
PAINLEVEL_OUTOF10: 0 - NO PAIN
PAINLEVEL_OUTOF10: 2
PAINLEVEL_OUTOF10: 0 - NO PAIN

## 2025-03-13 ASSESSMENT — PAIN DESCRIPTION - DESCRIPTORS
DESCRIPTORS: DISCOMFORT
DESCRIPTORS: DISCOMFORT
DESCRIPTORS: ACHING;TENDER;DISCOMFORT
DESCRIPTORS: DISCOMFORT

## 2025-03-13 ASSESSMENT — ACTIVITIES OF DAILY LIVING (ADL): ADL_ASSISTANCE: INDEPENDENT

## 2025-03-13 ASSESSMENT — PAIN SCALES - PAIN ASSESSMENT IN ADVANCED DEMENTIA (PAINAD): TOTALSCORE: MEDICATION (SEE MAR);COLD APPLIED

## 2025-03-13 NOTE — PROGRESS NOTES
Mitra Jeffries is a 74 y.o. female on day 1 of admission presenting with Arthritis of left knee.      Subjective   The patient reports her postoperative pain is adequately controlled.  She is tolerating physical therapy.  She denies dizziness, chest pain or shortness of breath.       Objective     Last Recorded Vitals  /65 (BP Location: Left arm, Patient Position: Lying)   Pulse 77   Temp 36.8 °C (98.2 °F) (Temporal)   Resp 16   Wt 85.9 kg (189 lb 6 oz)   SpO2 94%   Intake/Output last 3 Shifts:    Intake/Output Summary (Last 24 hours) at 3/13/2025 1015  Last data filed at 3/13/2025 0817  Gross per 24 hour   Intake 1980 ml   Output 1150 ml   Net 830 ml       Admission Weight  Weight: 81 kg (178 lb 8 oz) (03/12/25 0957)    Daily Weight  03/12/25 : 85.9 kg (189 lb 6 oz)    Image Results  XR knee left 1-2 views  Narrative: Interpreted By:  Andres Coe,   STUDY:  XR KNEE LEFT 1-2 VIEWS; ;  3/12/2025 3:20 pm      INDICATION:  Signs/Symptoms:Post-op knee.          COMPARISON:  12/20/2024      ACCESSION NUMBER(S):  [id number]      ORDERING CLINICIAN:  SPENCER DAVILA      FINDINGS:  Left knee, two views      Total knee arthroplasty in place. There is no periprosthetic fracture  or lucency. There is no dislocation. Moderate-sized effusion present.  Postsurgical changes in the soft tissues      Impression: No hardware failure of the left total knee arthroplasty          MACRO:  None      Signed by: Andres Coe 3/12/2025 3:22 PM  Dictation workstation:   [id number]      Physical Exam  Constitutional:       Appearance: Normal appearance.   HENT:      Head: Normocephalic and atraumatic.      Nose: Nose normal.      Mouth/Throat:      Mouth: Mucous membranes are moist.      Pharynx: Oropharynx is clear.   Eyes:      Extraocular Movements: Extraocular movements intact.      Conjunctiva/sclera: Conjunctivae normal.      Pupils: Pupils are equal, round, and reactive to light.   Cardiovascular:      Rate and  Rhythm: Normal rate and regular rhythm.      Pulses: Normal pulses.      Heart sounds: Normal heart sounds.   Pulmonary:      Effort: Pulmonary effort is normal.      Breath sounds: Normal breath sounds.   Abdominal:      General: Abdomen is flat. Bowel sounds are normal.      Palpations: Abdomen is soft.      Tenderness: There is no abdominal tenderness.   Musculoskeletal:         General: Normal range of motion.      Cervical back: Normal range of motion and neck supple.   Skin:     General: Skin is warm and dry.   Neurological:      General: No focal deficit present.      Mental Status: She is alert and oriented to person, place, and time.   Psychiatric:         Mood and Affect: Mood normal.         Behavior: Behavior normal.         Thought Content: Thought content normal.         Relevant Results               Assessment/Plan                  Assessment & Plan  Arthritis of left knee  The patient is doing well postoperatively.  Postoperative plan including DVT prophylaxis, pain medications, physical therapy and Occupational Therapy per orthopedic surgery.  Hypertension  This is adequately controlled postoperatively.  Her present medications are continued on discharge.  History of total knee arthroplasty, left  Plan as above  Acute postoperative anemia due to expected blood loss  She is asymptomatic with normal hemodynamic status.  There is no indication for blood transfusion  Hypokalemia  This is supplemented and corrected.                Rodrigo Pacheco MD

## 2025-03-13 NOTE — NURSING NOTE
Resting in bed with eyes closed and call light within reach.  Bed alarm is on.  Respirations even and unlabored on 2 liters nasal canula.

## 2025-03-13 NOTE — ASSESSMENT & PLAN NOTE
This is adequately controlled postoperatively.  Her present medications are continued on discharge.

## 2025-03-13 NOTE — NURSING NOTE
Patient will be discharging home transported by her . Reviewed discharge medication list and instructions with patient, she wrote notes and understood all instructions. Patient was escorted to car via  by nursing staff.

## 2025-03-13 NOTE — HH CARE COORDINATION
Home Care received a Referral for Physical Therapy and Occupational Therapy. We have processed the referral for a Start of Care on 3/14/25.     If you have any questions or concerns, please feel free to contact us at [phone]. Follow the prompts, enter your five digit zip code, and you will be directed to your care team on [facility] 1.

## 2025-03-13 NOTE — CARE PLAN
Walking with patient on Room Air; monitoring pulse oximetry; fluctuating SpO2 91-95%; consistently 93%; no SOB; patient using IS hourly as prescribed while sitting in chair; returned to chair after walk; no home O2 needed.

## 2025-03-13 NOTE — CARE PLAN
Pt POD#1 left knee replacement  Plan is home with discharge today with   [facility] PT. SOC on 3/14/25  M2B Received.  Spouse to transport home and assist with care as needed.

## 2025-03-13 NOTE — DISCHARGE SUMMARY
Discharge dx: S/p L Primary TKA    Hospital Course: Mitra Jeffries underwent the above procedure on 3/12/25 followed by transfer to the PACU then floor, where pain was controlled and diet was advanced as tolerated. Mitra progressed with PT/OT and was discharged home in stable condition on 3/13/2025. There were no known hospital issues or complications at time of discharge.    Rx: Based on clinical judgment, I provided an opioid prescription for acute pain following major surgery. Safe use of opioids was discussed.    Instructions: Discharge details, including weight bearing status, dressing instructions, VTE prophylaxis, new/continued/discontinued medications, reasons to call the office or report to the ED, and follow-up plans were provided in the After Visit Summary.    Follow-up: Mitra will call the office with any questions/concerns prior to the first follow-up appt.

## 2025-03-13 NOTE — PROGRESS NOTES
Physical Therapy Evaluation & Treatment    Patient Name: Mitra Jeffries  MRN: [id number]  Department:   Room: 202/202A  Today's Date: 3/13/2025   Time Calculation  Start Time: 0852  Stop Time: 0940  Time Calculation (min): 48 min    Assessment/Plan   PT Assessment  PT Assessment Results: Decreased strength, Decreased range of motion, Decreased mobility, Orthopedic restrictions, Pain  Rehab Prognosis: Good  Barriers to Discharge Home: No anticipated barriers  Evaluation/Treatment Tolerance: Patient tolerated treatment well  Medical Staff Made Aware: Yes  End of Session Communication: Bedside nurse  Assessment Comment: PT eval low . Pt presenting to eval with deficits in functional mobility, impaired ROM, impaired strength, and increased pain level. Pt requiring monitoring of oxygenation due to saturations on room air being mid 70s and then requiring O2 for safe mobility navigation. Pt able to complete everything with contact guard to supervision with appropriate device. No buckle or lOB Pt understanding of knee precautions and maintained them during session. Pt understanding of HEP and completed ther ex during session.. PT recommends [facility] PT with assistance as needed with supervision for continued improvement in mobility, strength, and pain management..    End of Session Patient Position: Up in chair, Alarm on (ice on L knee, call bell in reach, LLE elevated, NC intact and flowing 2L O2)   IP OR SWING BED PT PLAN  Inpatient or Swing Bed: Inpatient  PT Plan  Treatment/Interventions: Transfer training, Gait training, Stair training, Endurance training, Positioning, Therapeutic activity, Home exercise program, Range of motion, Strengthening  PT Plan: Ongoing PT  PT Frequency: BID  PT Discharge Recommendations: Low intensity level of continued care  Equipment Recommended upon Discharge: Wheeled walker, Straight cane  PT Recommended Transfer Status: Assistive device, Stand by assist  PT - OK to Discharge:  Yes      Subjective     General Visit Information:  General  Reason for Referral: L TKA (3/12/25)  Referred By: Dr. Mills  Past Medical History Relevant to Rehab: HTN, HLD, OA, diverticulosis, fractures, DVT, PE, anxiety, obesity, TKA, appendectomy, vertigo  Family/Caregiver Present: Yes  Prior to Session Communication: Bedside nurse  Patient Position Received: Up in bathroom  General Comment: cleared by RN and agreeable to session. on 2L O2  Home Living:  Home Living  Type of Home: Apartment  Lives With: Spouse  Home Adaptive Equipment: Walker rolling or standard, Cane  Home Layout: One level  Home Access: No concerns, Level entry  Home Living Comments:  to assist  Prior Level of Function:  Prior Function Per Pt/Caregiver Report  Level of [place]: Independent with ADLs and functional transfers  Receives Help From: Family  ADL Assistance: Independent  Homemaking Assistance: Needs assistance  Ambulatory Assistance: Needs assistance  Transfers: Independent, Assistive device  Gait: Independent, Assistive device  Prior Function Comments: no falls within last 6 months  Precautions:  Precautions  LE Weight Bearing Status: Weight Bearing as Tolerated  Medical Precautions: Fall precautions  Post-Surgical Precautions: Left total knee precautions     Date/Time Vitals Session Patient Position Pulse Resp SpO2 BP MAP (mmHg)    03/13/25 1255 --  --  --  --  93 %  --  --                Objective   Pain:  Pain Assessment  Pain Assessment: 0-10  0-10 (Numeric) Pain Score: 5 - Moderate pain  Pain Type: Surgical pain  Pain Location: Knee  Pain Orientation: Left  Pain Radiating Towards: anterior knee along incision  Pain Descriptors: Aching, Tender, Discomfort  Pain Frequency: Constant/continuous  Pain Onset: Gradual  Clinical Progression: Not changed  Pain Interventions: Cold applied, Rest, Repositioned, Ambulation/increased activity, Emotional support  Response to Interventions: Increase in pain (RN notified for  possible medication needs)  Cognition:  Cognition  Overall Cognitive Status: Within Functional Limits  Attention: Within Functional Limits  Memory: Within Funtional Limits  Problem Solving: Within Functional Limits  Numeric Reasoning: Within Functional Limits  Abstract Reasoning: Within Functional Limits  Safety/Judgement: Within Functional Limits  Insight: Within function limits  Impulsive: Within functional limits  Processing Speed: Within funtional limits    General Assessments:  General Observation  General Observation: dressing intact on L knee, however more than 3/4ths of dressing is saturated with sanguinous drainage. RN aware and coming in to replace dressing.               Activity Tolerance  Endurance: Endurance does not limit participation in activity    Sensation  Light Touch: No apparent deficits            Perception  Inattention/Neglect: Appears intact  Initiation: Appears intact  Motor Planning: Appears intact  Perseveration: Not present      Coordination  Movements are Fluid and Coordinated: Yes    Postural Control  Postural Control: Within Functional Limits    Static Sitting Balance  Static Sitting-Balance Support: Feet supported  Static Sitting-Level of Assistance: Independent  Dynamic Sitting Balance  Dynamic Sitting-Balance Support: Feet supported  Dynamic Sitting-Level of Assistance: Independent    Static Standing Balance  Static Standing-Balance Support: Bilateral upper extremity supported  Static Standing-Level of Assistance: Close supervision  Static Standing-Comment/Number of Minutes: with RW  Dynamic Standing Balance  Dynamic Standing-Balance Support: Bilateral upper extremity supported  Dynamic Standing-Level of Assistance: Close supervision  Dynamic Standing-Comments: with RW  Functional Assessments:  Bed Mobility  Bed Mobility: No    Transfers  Transfer: Yes  Transfer 1  Technique 1: Sit to stand, Stand to sit  Transfer Device 1: Walker  Transfer Level of Assistance 1: Contact  guard  Trials/Comments 1: STS from bed x2, STS from commode x1 without buckle or LOB, slow upright posture, tolerated well.    Ambulation/Gait Training  Ambulation/Gait Training Performed: Yes  Ambulation/Gait Training 1  Surface 1: Level tile  Device 1: Rolling walker  Assistance 1: Close supervision, Distant supervision  Quality of Gait 1: Decreased step length, Forward flexed posture, Antalgic (reciprocal stepping, no buckle or lOB, cued for upright posture.)  Comments/Distance (ft) 1: ambulated 12 feetx2, 120 feetx1 (PT oxygenation monitored.  on room air patient saturated in mid 70s and required immediate donning of O2 for safety. 2L O2 she satured at 91% and on 3L O2 saturated at 93%. Once resting, on 2 L O2 she rested at 93-94%. RN and RT aware)    Stairs  Stairs: No  Extremity/Trunk Assessments:  RUE   RUE : Within Functional Limits  LUE   LUE: Within Functional Limits  RLE   RLE : Within Functional Limits  LLE   LLE : Exceptions to WFL  AROM LLE (degrees)  LLE AROM Comment: limited knee ROM due to surgical limitations. KNee flexion <45 degrees and knee extension -5 degrees  Strength LLE  LLE Overall Strength: Greater than or equal to 3/5 as evidenced by functional mobility  Treatments:  Therapeutic Exercise  Therapeutic Exercise Performed: Yes (LLE only, x7)  Therapeutic Exercise Activity 1: ankle pump  Therapeutic Exercise Activity 2: quad set  Therapeutic Exercise Activity 3: glute set  Therapeutic Exercise Activity 4: heel slide  Therapeutic Exercise Activity 5: SLR  Therapeutic Exercise Activity 6: hip abduction  Therapeutic Exercise Activity 7: SAQ    Therapeutic Activity  Therapeutic Activity Performed: Yes  Therapeutic Activity 1: pt toileted without assist for hand hygiene or cleansing hygiene. no buckle or lOB with activity  Therapeutic Activity 2: pt dressed BUE without assist and BLE without assist for undergarments, however PT did need to assist for donning new reyes hose after dressing had  drainage and leaked through hose.  Outcome Measures:  [facility] Basic Mobility  Turning from your back to your side while in a flat bed without using bedrails: None  Moving from lying on your back to sitting on the side of a flat bed without using bedrails: None  Moving to and from bed to chair (including a wheelchair): A little  Standing up from a chair using your arms (e.g. wheelchair or bedside chair): A little  To walk in hospital room: None  Climbing 3-5 steps with railing: A little  Basic Mobility - Total Score: 21    Encounter Problems       Encounter Problems (Active)       Pain - Adult              Education Documentation  Handouts, taught by Maggy Meadows PT at 3/13/2025  2:32 PM.  Learner: Significant Other, Patient  Readiness: Acceptance  Method: Explanation, Demonstration, Handout  Response: Verbalizes Understanding, Demonstrated Understanding    Precautions, taught by Maggy Meadows PT at 3/13/2025  2:32 PM.  Learner: Significant Other, Patient  Readiness: Acceptance  Method: Explanation, Demonstration, Handout  Response: Verbalizes Understanding, Demonstrated Understanding    Body Mechanics, taught by Maggy Meadows PT at 3/13/2025  2:32 PM.  Learner: Significant Other, Patient  Readiness: Acceptance  Method: Explanation, Demonstration, Handout  Response: Verbalizes Understanding, Demonstrated Understanding    Home Exercise Program, taught by Maggy Meadows PT at 3/13/2025  2:32 PM.  Learner: Significant Other, Patient  Readiness: Acceptance  Method: Explanation, Demonstration, Handout  Response: Verbalizes Understanding, Demonstrated Understanding    Mobility Training, taught by Maggy Meadows PT at 3/13/2025  2:32 PM.  Learner: Significant Other, Patient  Readiness: Acceptance  Method: Explanation, Demonstration, Handout  Response: Verbalizes Understanding, Demonstrated Understanding    Education Comments  educated on knee precautions: no kneeling, no twisting/ pivoting, not resting with knee  flexed but in extension and elevated, ambulate each hour for reduction in stiffness and DVT risk, use of walker for all activity, allow knee flexion with activity, and complete exercises twice a day 3 sets of 10.     Maggy Meadows, PT, DPT

## 2025-03-13 NOTE — PROGRESS NOTES
HOSPITALIST    Patient seen, chart reviewed.  Vital signs stable.  Patient awake sitting in bed.  She has been up out of bed and voided since surgery.  Pain is currently controlled.  Patient noted to be on 2 L nasal cannula.  She is using incentive spirometry.

## 2025-03-13 NOTE — NURSING NOTE
Patients surgical dressing is saturated, per MD order the dressing was removed and a new silver mepilex ag was applied. Patient stated that MD does not want homecare to remove dressing in one week, surgeon will remove at follow up appointment.  Patient tolerated dressing change well.

## 2025-03-13 NOTE — NURSING NOTE
Patient continues on 2LNC, did not do well with her walking oxygen study. Respiratory will continue to work with her to give her a chance to improve before sending her home with oxygen.  and daughter at bedside.

## 2025-03-13 NOTE — PROGRESS NOTES
S: NAEON. Feeling well, pleased with care.          O  VS:  Temp:  [36.2 °C (97.2 °F)-36.8 °C (98.2 °F)] 36.8 °C (98.2 °F)  Heart Rate:  [] 77  Resp:  [14-20] 16  BP: (119-150)/(62-84) 122/65    Gen: NAD  Focused exam of operative extremity:  -Dressing with minor stains  -SILT in S/S/SP/DP/T/F distributions  -Able to flex Q, TA, GS  -Palpable DP pulse, symmetric to contralateral  -Thigh high TEDs, SCDs in place    Drain  Na  Micro  Na  PT/OT  Pending  Labs (pertinent)  Hgb 13.8  Imaging  No new imaging          A&P: Mitra Jeffries is a 74 y.o. female s/p L primary TKA on 3/12/2025.  Abx prophylaxis (does not need to stay for postop dose if otherwise ready to DC)  Activity: WBAT.  Analgesia: Multimodal with breakthrough   Consults: Appreciate therapy, CORNELIO, case mgmt, IM  Diet: Routine. DC IVF when adequate PO intake  Dressing: Aquacel/Mepilex for 7d. Patient will remove at home then apply daily dry dressing PRN  DVT ppx: Early mobilization, SCDs, pharmacologic  BLE swelling: Compression hose  DC: Pending PT/OT

## 2025-03-13 NOTE — NURSING NOTE
Assumed care of patient.  Patient is awaked in bed during bedside report. No c/o of pain voiced. Patient was updated on the plan of care for today. Breakfast ordered call light within reach.

## 2025-03-13 NOTE — CARE PLAN
Walking with PT and patient on Room Air; quickly desaturated from SpO2 94% to 77%; placed on 2L  just outside of room for 89-91% SpO2; increased to 3L nasal cannula SpO2 improved to 93-95% for the remainder of the walk; no SOB; placed back on 2L for patient sitting in chair (first time out of bed sitting in chair) SpO2 94%; will work with patient using IS.

## 2025-03-13 NOTE — NURSING NOTE
Patient participated in a walking POX study, she passed. Will be discharging home.  at bedside.

## 2025-03-13 NOTE — PROGRESS NOTES
Medication Education     Medication education for Mitra Jeffries was provided to the patient and family for the following medication(s):    APAP  Apixaban  Cefadroxil  Docusate  Oxycodone  Pantoprazole  Polyethylene glycol  Pregabalin  Tramadol      Medication education provided by a Pharmacist:  Dose, frequency, storage How to take and what to do if a dose is missed Proper dose, indication, possible ADRs How the medication works and benefits of taking it    Identified potential barriers to education:  None    Method(s) of Education:  Verbal Written materials provided and reviewed    An opportunity to ask questions and receive answers was provided.     Assessment of understanding the patient and family:  2= meets goals/outcomes    Additional Notes (if applicable): meds 2 beds delivered to patient    Nidhi Fletcher PharmD

## 2025-03-13 NOTE — ASSESSMENT & PLAN NOTE
The patient is doing well postoperatively.  Postoperative plan including DVT prophylaxis, pain medications, physical therapy and Occupational Therapy per orthopedic surgery.

## 2025-03-14 ENCOUNTER — HOME CARE VISIT (OUTPATIENT)
Dept: HOME HEALTH SERVICES | Facility: HOME HEALTH | Age: 75
End: 2025-03-14
Payer: MEDICARE

## 2025-03-14 VITALS — BODY MASS INDEX: 32.39 KG/M2 | HEIGHT: 62 IN | WEIGHT: 176 LBS

## 2025-03-14 PROCEDURE — G0151 HHCP-SERV OF PT,EA 15 MIN: HCPCS | Mod: HHH

## 2025-03-14 PROCEDURE — 169592 NO-PAY CLAIM PROCEDURE

## 2025-03-14 SDOH — HEALTH STABILITY: PHYSICAL HEALTH: EXERCISE TYPE: HEP FROM HOSPITAL

## 2025-03-14 ASSESSMENT — ACTIVITIES OF DAILY LIVING (ADL)
ENTERING_EXITING_HOME: SUPERVISION
OASIS_M1830: 05
AMBULATION_DISTANCE/DURATION_TOLERATED: 50 FT
PHYSICAL_TRANSFERS_DEVICES: FWW
AMBULATION ASSISTANCE: STAND BY ASSIST
AMBULATION ASSISTANCE ON FLAT SURFACES: 1
AMBULATION ASSISTANCE: 1
PHYSICAL TRANSFERS ASSESSED: 1
CURRENT_FUNCTION: SUPERVISION

## 2025-03-14 ASSESSMENT — ENCOUNTER SYMPTOMS
MUSCLE WEAKNESS: 1
HYPERTENSION: 1
LOWEST PAIN SEVERITY IN PAST 24 HOURS: 9/10
HIGHEST PAIN SEVERITY IN PAST 24 HOURS: 6/10
OCCASIONAL FEELINGS OF UNSTEADINESS: 0
LOSS OF SENSATION IN FEET: 0
PAIN: 1
PAIN LOCATION - PAIN FREQUENCY: CONSTANT
PAIN LOCATION - PAIN QUALITY: ACHE
PAIN LOCATION - EXACERBATING FACTORS: AMB , WB
LOWER EXTREMITY EDEMA: 1
SUBJECTIVE PAIN PROGRESSION: GRADUALLY IMPROVING
LIMITED RANGE OF MOTION: 1
PERSON REPORTING PAIN: PATIENT
DEPRESSION: 0
PAIN SEVERITY GOAL: 0/10
FATIGUES EASILY: 1
PAIN LOCATION - PAIN DURATION: -
PAIN LOCATION: LEFT KNEE

## 2025-03-14 NOTE — HOME HEALTH
Hospital Course: Mitra Jeffries underwent the above procedure on 3/12/25 followed by transfer to the PACU then floor, where pain was controlled and diet was advanced as tolerated. Mitra progressed with PT/OT and was discharged home in stable condition on 3/13/2025. There were no known hospital issues or complications at time of discharge.   Rx: Based on clinical judgment, I provided an opioid prescription for acute pain following major surgery. Safe use of opioids was discussed.   Instructions: Discharge details, including weight bearing status, dressing instructions, VTE prophylaxis, new/continued/discontinued medications, reasons to call the office or report to the ED, and follow-up plans were provided in the After Visit Summary.    PRIOR FUNCTION not driving.  did not work (1970)  . indep bathing and dsg.   using rollator for about 3 wks before sx.   moved to apt 2 mos ago  R TKR 7 ys ago    PRECAUTIONS; universal, fall risk, L TKR  using redi bath for sponge bath.  Pt and spouse are JWs.

## 2025-03-15 ENCOUNTER — HOME CARE VISIT (OUTPATIENT)
Dept: HOME HEALTH SERVICES | Facility: HOME HEALTH | Age: 75
End: 2025-03-15
Payer: MEDICARE

## 2025-03-15 VITALS
DIASTOLIC BLOOD PRESSURE: 78 MMHG | OXYGEN SATURATION: 94 % | RESPIRATION RATE: 18 BRPM | TEMPERATURE: 98 F | SYSTOLIC BLOOD PRESSURE: 120 MMHG | HEART RATE: 90 BPM

## 2025-03-15 PROCEDURE — G0152 HHCP-SERV OF OT,EA 15 MIN: HCPCS | Mod: HHH

## 2025-03-15 ASSESSMENT — ENCOUNTER SYMPTOMS
PAIN: 1
PERSON REPORTING PAIN: PATIENT
HIGHEST PAIN SEVERITY IN PAST 24 HOURS: 10/10
PAIN LOCATION - PAIN SEVERITY: 7/10
OCCASIONAL FEELINGS OF UNSTEADINESS: 1
PAIN LOCATION: LEFT KNEE
SUBJECTIVE PAIN PROGRESSION: GRADUALLY IMPROVING
LOWEST PAIN SEVERITY IN PAST 24 HOURS: 2/10

## 2025-03-15 ASSESSMENT — ACTIVITIES OF DAILY LIVING (ADL)
TOILETING: INDEPENDENT
TOILETING: 1
BATHING ASSESSED: 1
DRESSING_LB_CURRENT_FUNCTION: INDEPENDENT
BATHING_CURRENT_FUNCTION: CONTACT GUARD ASSIST
DRESSING_UB_CURRENT_FUNCTION: INDEPENDENT

## 2025-03-17 ENCOUNTER — HOME CARE VISIT (OUTPATIENT)
Dept: HOME HEALTH SERVICES | Facility: HOME HEALTH | Age: 75
End: 2025-03-17
Payer: MEDICARE

## 2025-03-17 VITALS — RESPIRATION RATE: 16 BRPM

## 2025-03-17 PROCEDURE — G0151 HHCP-SERV OF PT,EA 15 MIN: HCPCS | Mod: HHH

## 2025-03-17 SDOH — HEALTH STABILITY: PHYSICAL HEALTH: EXERCISE TYPE: TKA

## 2025-03-17 SDOH — HEALTH STABILITY: PHYSICAL HEALTH: EXERCISE ACTIVITIES SETS: 3

## 2025-03-17 SDOH — HEALTH STABILITY: PHYSICAL HEALTH: PHYSICAL EXERCISE: 15

## 2025-03-17 SDOH — HEALTH STABILITY: PHYSICAL HEALTH: PHYSICAL EXERCISE: SIT, STAND, SUPINE

## 2025-03-17 SDOH — HEALTH STABILITY: PHYSICAL HEALTH: EXERCISE ACTIVITY: OPEN CHAIN

## 2025-03-17 ASSESSMENT — ACTIVITIES OF DAILY LIVING (ADL)
AMBULATION_DISTANCE/DURATION_TOLERATED: 100 FT
PHYSICAL TRANSFERS ASSESSED: 1
CURRENT_FUNCTION: INDEPENDENT
AMBULATION ASSISTANCE ON FLAT SURFACES: 1

## 2025-03-17 ASSESSMENT — ENCOUNTER SYMPTOMS
PAIN LOCATION - PAIN QUALITY: ACHE
LOWEST PAIN SEVERITY IN PAST 24 HOURS: 2/10
PAIN SEVERITY GOAL: 1/10
PAIN LOCATION - EXACERBATING FACTORS: ROM
PAIN LOCATION - PAIN DURATION: MIN
MUSCLE WEAKNESS: 1
PAIN LOCATION - PAIN FREQUENCY: INTERMITTENT
PAIN LOCATION: LEFT KNEE
LIMITED RANGE OF MOTION: 1
PAIN: 1
PAIN LOCATION - PAIN SEVERITY: 4/10
PAIN LOCATION - RELIEVING FACTORS: MEDS, CP
HIGHEST PAIN SEVERITY IN PAST 24 HOURS: 5/10
PERSON REPORTING PAIN: PATIENT

## 2025-03-18 ENCOUNTER — HOME CARE VISIT (OUTPATIENT)
Dept: HOME HEALTH SERVICES | Facility: HOME HEALTH | Age: 75
End: 2025-03-18
Payer: MEDICARE

## 2025-03-18 VITALS
DIASTOLIC BLOOD PRESSURE: 86 MMHG | TEMPERATURE: 98.2 F | SYSTOLIC BLOOD PRESSURE: 152 MMHG | RESPIRATION RATE: 16 BRPM | HEART RATE: 83 BPM

## 2025-03-18 PROCEDURE — G0158 HHC OT ASSISTANT EA 15: HCPCS | Mod: CO,HHH

## 2025-03-18 NOTE — SIGNIFICANT EVENT
Thank you for taking my call today regarding your recent joint replacement surgery with Dr. Cristofer Mills.      We discussed that: Home Health Care services (physical and/or occupational therapy) have been initiated Your pain is Controlled on the current regimen Will fluctuate throughout recovery with increased activity You are able to tolerate regular activity and exercises The importance of continued cold therapy throughout recovery The importance of following the prescribed precautions by your surgeon You have had a bowel movement The importance of continuing blood thinner as prescribed The importance of wearing compression stockings as prescribed    You indicated that all of your questions have been answered at the time of our call.    Please don't hesitate to reach out if you have any additional questions or concerns.    Jessie Dunaway MBA, BSN, RN-BC, ONC  Orthopedic Patient Navigator  [facility]   [phone]

## 2025-03-19 ENCOUNTER — HOME CARE VISIT (OUTPATIENT)
Dept: HOME HEALTH SERVICES | Facility: HOME HEALTH | Age: 75
End: 2025-03-19
Payer: MEDICARE

## 2025-03-19 VITALS — OXYGEN SATURATION: 97 % | RESPIRATION RATE: 16 BRPM | HEART RATE: 74 BPM

## 2025-03-19 PROCEDURE — G0151 HHCP-SERV OF PT,EA 15 MIN: HCPCS | Mod: HHH

## 2025-03-19 SDOH — HEALTH STABILITY: PHYSICAL HEALTH: EXERCISE TYPE: TKA

## 2025-03-19 ASSESSMENT — ENCOUNTER SYMPTOMS
MUSCLE WEAKNESS: 1
LIMITED RANGE OF MOTION: 1
PAIN SEVERITY GOAL: 0/10
SUBJECTIVE PAIN PROGRESSION: GRADUALLY IMPROVING
PAIN LOCATION - PAIN FREQUENCY: INTERMITTENT
PERSON REPORTING PAIN: PATIENT
HIGHEST PAIN SEVERITY IN PAST 24 HOURS: 4/10
PAIN LOCATION - EXACERBATING FACTORS: ROM
PAIN LOCATION: LEFT KNEE
PAIN: 1
PAIN LOCATION - PAIN SEVERITY: 5/10
LOWEST PAIN SEVERITY IN PAST 24 HOURS: 1/10
PAIN LOCATION - PAIN DURATION: MIN
PAIN LOCATION - PAIN QUALITY: ACHE

## 2025-03-19 ASSESSMENT — ACTIVITIES OF DAILY LIVING (ADL)
AMBULATION_DISTANCE/DURATION_TOLERATED: 100 FT
AMBULATION ASSISTANCE ON FLAT SURFACES: 1
PHYSICAL TRANSFERS ASSESSED: 1

## 2025-03-21 ENCOUNTER — HOME CARE VISIT (OUTPATIENT)
Dept: HOME HEALTH SERVICES | Facility: HOME HEALTH | Age: 75
End: 2025-03-21
Payer: MEDICARE

## 2025-03-21 VITALS
DIASTOLIC BLOOD PRESSURE: 86 MMHG | SYSTOLIC BLOOD PRESSURE: 138 MMHG | OXYGEN SATURATION: 95 % | HEART RATE: 90 BPM | TEMPERATURE: 97.7 F | RESPIRATION RATE: 12 BRPM

## 2025-03-21 DIAGNOSIS — Z96.652 HISTORY OF TOTAL KNEE ARTHROPLASTY, LEFT: Primary | ICD-10-CM

## 2025-03-21 PROCEDURE — G0152 HHCP-SERV OF OT,EA 15 MIN: HCPCS | Mod: HHH

## 2025-03-21 RX ORDER — OXYCODONE HYDROCHLORIDE 5 MG/1
5 TABLET ORAL EVERY 6 HOURS PRN
Qty: 28 TABLET | Refills: 0 | Status: SHIPPED | OUTPATIENT
Start: 2025-03-21 | End: 2025-03-28

## 2025-03-21 RX ORDER — TRAMADOL HYDROCHLORIDE 50 MG/1
50 TABLET ORAL EVERY 6 HOURS PRN
Qty: 28 TABLET | Refills: 0 | Status: SHIPPED | OUTPATIENT
Start: 2025-03-21 | End: 2025-03-28

## 2025-03-21 ASSESSMENT — ACTIVITIES OF DAILY LIVING (ADL)
BATHING_CURRENT_FUNCTION: INDEPENDENT
BATHING ASSESSED: 1
TOILETING: INDEPENDENT
TOILETING: 1
DRESSING_LB_CURRENT_FUNCTION: INDEPENDENT

## 2025-03-21 ASSESSMENT — ENCOUNTER SYMPTOMS
LOWEST PAIN SEVERITY IN PAST 24 HOURS: 2/10
PAIN: 1
PERSON REPORTING PAIN: PATIENT
PAIN LOCATION: LEFT KNEE
HIGHEST PAIN SEVERITY IN PAST 24 HOURS: 8/10
PAIN LOCATION - PAIN SEVERITY: 4/10
SUBJECTIVE PAIN PROGRESSION: GRADUALLY IMPROVING

## 2025-03-24 ENCOUNTER — TELEPHONE (OUTPATIENT)
Dept: PRIMARY CARE | Facility: CLINIC | Age: 75
End: 2025-03-24

## 2025-03-24 DIAGNOSIS — I10 PRIMARY HYPERTENSION: ICD-10-CM

## 2025-03-24 RX ORDER — AMLODIPINE BESYLATE 10 MG/1
10 TABLET ORAL DAILY
Qty: 90 TABLET | Refills: 1 | Status: SHIPPED | OUTPATIENT
Start: 2025-03-24

## 2025-03-25 ENCOUNTER — HOME CARE VISIT (OUTPATIENT)
Dept: HOME HEALTH SERVICES | Facility: HOME HEALTH | Age: 75
End: 2025-03-25
Payer: MEDICARE

## 2025-03-25 VITALS — RESPIRATION RATE: 16 BRPM

## 2025-03-25 PROCEDURE — G0151 HHCP-SERV OF PT,EA 15 MIN: HCPCS | Mod: HHH

## 2025-03-25 SDOH — HEALTH STABILITY: PHYSICAL HEALTH: EXERCISE ACTIVITIES SETS: 2

## 2025-03-25 SDOH — HEALTH STABILITY: PHYSICAL HEALTH: PHYSICAL EXERCISE: SIT, STAND, SUPINE

## 2025-03-25 SDOH — HEALTH STABILITY: PHYSICAL HEALTH: PHYSICAL EXERCISE: 15

## 2025-03-25 SDOH — HEALTH STABILITY: PHYSICAL HEALTH: EXERCISE ACTIVITY: OPEN/ CLOSED CHAIN  EX

## 2025-03-25 SDOH — HEALTH STABILITY: PHYSICAL HEALTH: EXERCISE TYPE: TKA

## 2025-03-25 ASSESSMENT — ENCOUNTER SYMPTOMS
PAIN: 1
LOWEST PAIN SEVERITY IN PAST 24 HOURS: 1/10
SUBJECTIVE PAIN PROGRESSION: RESOLVED
HIGHEST PAIN SEVERITY IN PAST 24 HOURS: 5/10
PAIN LOCATION: LEFT KNEE
PERSON REPORTING PAIN: PATIENT
LIMITED RANGE OF MOTION: 1
PAIN LOCATION - PAIN FREQUENCY: INTERMITTENT
PAIN SEVERITY GOAL: 0/10
PAIN LOCATION - RELIEVING FACTORS: CP, MEDS
PAIN LOCATION - EXACERBATING FACTORS: ROM
PAIN LOCATION - PAIN SEVERITY: 5/10
PAIN LOCATION - PAIN QUALITY: ACHE
PAIN LOCATION - PAIN DURATION: MIN
MUSCLE WEAKNESS: 1

## 2025-03-25 ASSESSMENT — ACTIVITIES OF DAILY LIVING (ADL)
CURRENT_FUNCTION: INDEPENDENT
PHYSICAL TRANSFERS ASSESSED: 1
AMBULATION_DISTANCE/DURATION_TOLERATED: 100 FT
AMBULATION ASSISTANCE ON FLAT SURFACES: 1

## 2025-03-27 ENCOUNTER — HOME CARE VISIT (OUTPATIENT)
Dept: HOME HEALTH SERVICES | Facility: HOME HEALTH | Age: 75
End: 2025-03-27
Payer: MEDICARE

## 2025-03-27 VITALS — RESPIRATION RATE: 16 BRPM

## 2025-03-27 PROCEDURE — G0151 HHCP-SERV OF PT,EA 15 MIN: HCPCS | Mod: HHH

## 2025-03-27 SDOH — HEALTH STABILITY: PHYSICAL HEALTH: EXERCISE ACTIVITY: OPEN/ CLOSED CHAIN

## 2025-03-27 SDOH — HEALTH STABILITY: PHYSICAL HEALTH: EXERCISE TYPE: TKA

## 2025-03-27 ASSESSMENT — ENCOUNTER SYMPTOMS
MUSCLE WEAKNESS: 1
LIMITED RANGE OF MOTION: 1
SUBJECTIVE PAIN PROGRESSION: GRADUALLY IMPROVING
LOWEST PAIN SEVERITY IN PAST 24 HOURS: 1/10
HIGHEST PAIN SEVERITY IN PAST 24 HOURS: 5/10
PAIN LOCATION - PAIN QUALITY: ACHE
PAIN: 1
PAIN LOCATION - PAIN DURATION: HR
PAIN LOCATION - RELIEVING FACTORS: MVT
PAIN LOCATION - PAIN FREQUENCY: INTERMITTENT
PAIN SEVERITY GOAL: 0/10
PAIN LOCATION: LEFT KNEE
PAIN LOCATION - EXACERBATING FACTORS: ROM
PERSON REPORTING PAIN: PATIENT
PAIN LOCATION - PAIN SEVERITY: 5/10

## 2025-03-27 ASSESSMENT — ACTIVITIES OF DAILY LIVING (ADL)
AMBULATION ASSISTANCE ON FLAT SURFACES: 1
AMBULATION_DISTANCE/DURATION_TOLERATED: 100 FT
PHYSICAL TRANSFERS ASSESSED: 1
CURRENT_FUNCTION: INDEPENDENT

## 2025-03-31 ENCOUNTER — OFFICE VISIT (OUTPATIENT)
Dept: ORTHOPEDIC SURGERY | Facility: CLINIC | Age: 75
End: 2025-03-31
Payer: MEDICARE

## 2025-03-31 ENCOUNTER — APPOINTMENT (OUTPATIENT)
Dept: ORTHOPEDIC SURGERY | Facility: CLINIC | Age: 75
End: 2025-03-31
Payer: MEDICARE

## 2025-03-31 DIAGNOSIS — M79.89 LIMB SWELLING: Primary | ICD-10-CM

## 2025-03-31 PROCEDURE — 1111F DSCHRG MED/CURRENT MED MERGE: CPT | Performed by: STUDENT IN AN ORGANIZED HEALTH CARE EDUCATION/TRAINING PROGRAM

## 2025-03-31 PROCEDURE — 1125F AMNT PAIN NOTED PAIN PRSNT: CPT | Performed by: STUDENT IN AN ORGANIZED HEALTH CARE EDUCATION/TRAINING PROGRAM

## 2025-03-31 PROCEDURE — 1036F TOBACCO NON-USER: CPT | Performed by: STUDENT IN AN ORGANIZED HEALTH CARE EDUCATION/TRAINING PROGRAM

## 2025-03-31 PROCEDURE — 1159F MED LIST DOCD IN RCRD: CPT | Performed by: STUDENT IN AN ORGANIZED HEALTH CARE EDUCATION/TRAINING PROGRAM

## 2025-03-31 PROCEDURE — 99211 OFF/OP EST MAY X REQ PHY/QHP: CPT | Performed by: STUDENT IN AN ORGANIZED HEALTH CARE EDUCATION/TRAINING PROGRAM

## 2025-03-31 ASSESSMENT — PAIN - FUNCTIONAL ASSESSMENT: PAIN_FUNCTIONAL_ASSESSMENT: 0-10

## 2025-03-31 ASSESSMENT — PAIN SCALES - GENERAL: PAINLEVEL_OUTOF10: 2

## 2025-03-31 NOTE — PROGRESS NOTES
"DEVEN/TKA Related Summary           L hip: N  L knee  3/13/25: Primary TKA (myself at )  R hip: N  R knee  1/7/2017: Primary TKA (Dr. Dr. Rosales at [facility])  Lumbar surgery or significant pathology: N            CC/SUBJECTIVE/HPI            Mitra Jeffries is a 74 y.o. female following up regarding:    L knee: S/p L Primary TKA  Reports outcome as good  Pain mgmt: OTC meds and occasional narcotics  Assistive device: walker  Able to navigate stairs: Y  Sleeping normally: Y  PT: yes, current (2/wk)            HISTORIES            Reports no major changes in health, substance use, or baseline medications.            OBJECTIVE            Physical exam  Estimated body mass index is 34.64 kg/m² as calculated from the following:    Height as of 3/12/25: 1.575 m (5' 2\").    Weight as of 3/12/25: 85.9 kg (189 lb 6 oz).  Gen/psych: NAD, conversational, appropriate    Ambulation  Gait: Nonantalgic  Assistive device: walker  Spine: Unchanged from previous visit    Focused MSK exam: L  TKA  Skin/Incision: CDI, healing appropriately.   Effusion: trace  Alignment: neutral  Tenderness: none  Extension: passive flexion contracture 0-5° and active extension lag 0°  Flexion: 110º  Flexion stability: stable  Varus/Valgus stability: stable  Referred pain to knee with hip 90° flexion and 45° ER/IR: neg  Neurovascular    Strength: 5/5 hip/knee/ankle flexion and extension  Sensory (L2-S1): SILT throughout lower extremity  Edema/stasis: Diffuse swelling with no pitting  Pulse: DP 1+, PT 1+        Imaging  No new imaging             ASSESSMENT & PLAN           L Primary TKA  Incision: Dressing/sutures/staples removed. Incision care discussed.  Activity/Therapy: Continue exercises and PT. Advance gait aide as appropriate.  Pain: Decrease narcotics; supplement with OTCs, ice.  DVT ppx Eliquis  SURYA/ACE: PRN for operative extremity swelling.  Noticeable swelling on exam today.  Low suspicion for DVT.  Reports compliance with " "Eliquis.  Ordered Doppler to be sure.  4/3/2025 addendum: LLE venous ultrasound \"Peroneal veins visualized in segments due to edema. Unable to rule out thrombus in non-visualized vessels. No sonographic evidence of thrombus in nvessels visualized.\"  I reached out to Dr. French, who read the study.  The only way to rule out thrombus in the nonvisualized vessels would be a CT venogram.  I called Mitra and discussed this.  Being that we have low suspicion for DVT, her previous DVT was \"very small\" in the calf, she reports her swelling has been getting better, and she has been compliant with the high risk postoperative plan of Eliquis, we both agreed that pursuing a CT venogram is not necessary at this time. Discussed signs and symptoms of DVT/PE that should prompt her to contact the office or report to ED immediately.  Follow-up: 6wks postop with XRs.  R Primary TKA (Dr. Rosales, 2017)  Doing well  Follow-up: Interval XR in 5yrs (2029)  PMH, PSH, other considerations discussed  BMI<40 but on spectrum of increased risk of surgical complications.  Hx DVT/PE (\"very small\" clot in calf following previous surgery)  PCN allergy (hives)  Yazdanism  Daughter of my existing pt, Madalyn Mosheroli.  Friends of my existing pts Benita Gordon  Occupation: Homemaker  Hobbies: Gardening, bird watching  PCP: Tico Negro MD  "

## 2025-04-01 ENCOUNTER — HOME CARE VISIT (OUTPATIENT)
Dept: HOME HEALTH SERVICES | Facility: HOME HEALTH | Age: 75
End: 2025-04-01
Payer: MEDICARE

## 2025-04-01 VITALS — RESPIRATION RATE: 16 BRPM | TEMPERATURE: 98.8 F

## 2025-04-01 PROCEDURE — G0151 HHCP-SERV OF PT,EA 15 MIN: HCPCS | Mod: HHH

## 2025-04-01 SDOH — HEALTH STABILITY: PHYSICAL HEALTH: EXERCISE ACTIVITIES SETS: 2

## 2025-04-01 SDOH — HEALTH STABILITY: PHYSICAL HEALTH: EXERCISE ACTIVITY: OPEN/  CLOSED CHAIN

## 2025-04-01 SDOH — HEALTH STABILITY: PHYSICAL HEALTH: PHYSICAL EXERCISE: 15

## 2025-04-01 SDOH — HEALTH STABILITY: PHYSICAL HEALTH: PHYSICAL EXERCISE: SIT, STAND, SUPINE

## 2025-04-01 SDOH — HEALTH STABILITY: PHYSICAL HEALTH: EXERCISE TYPE: TKA

## 2025-04-01 ASSESSMENT — ENCOUNTER SYMPTOMS
PERSON REPORTING PAIN: PATIENT
LOWEST PAIN SEVERITY IN PAST 24 HOURS: 1/10
PAIN LOCATION - PAIN SEVERITY: 3/10
PAIN: 1
MUSCLE WEAKNESS: 1
SUBJECTIVE PAIN PROGRESSION: GRADUALLY IMPROVING
PAIN LOCATION - PAIN DURATION: MIN
HIGHEST PAIN SEVERITY IN PAST 24 HOURS: 3/10
PAIN LOCATION - PAIN QUALITY: ACHE
PAIN LOCATION - RELIEVING FACTORS: MEDS, CP
LIMITED RANGE OF MOTION: 1
PAIN LOCATION: LEFT KNEE
PAIN SEVERITY GOAL: 0/10
PAIN LOCATION - PAIN FREQUENCY: INTERMITTENT
PAIN LOCATION - EXACERBATING FACTORS: ROM

## 2025-04-01 ASSESSMENT — ACTIVITIES OF DAILY LIVING (ADL)
AMBULATION ASSISTANCE ON FLAT SURFACES: 1
CURRENT_FUNCTION: INDEPENDENT
OASIS_M1830: 00
HOME_HEALTH_OASIS: 00
AMBULATION_DISTANCE/DURATION_TOLERATED: 100 FT
PHYSICAL TRANSFERS ASSESSED: 1

## 2025-04-02 DIAGNOSIS — Z96.652 HISTORY OF TOTAL KNEE REPLACEMENT, LEFT: Primary | ICD-10-CM

## 2025-04-02 RX ORDER — OXYCODONE HYDROCHLORIDE 5 MG/1
5 TABLET ORAL EVERY 6 HOURS PRN
Qty: 28 TABLET | Refills: 0 | Status: SHIPPED | OUTPATIENT
Start: 2025-04-02 | End: 2025-04-09

## 2025-04-02 RX ORDER — TRAMADOL HYDROCHLORIDE 50 MG/1
50 TABLET ORAL EVERY 6 HOURS PRN
Qty: 28 TABLET | Refills: 0 | Status: SHIPPED | OUTPATIENT
Start: 2025-04-02 | End: 2025-04-09

## 2025-04-03 ENCOUNTER — ANCILLARY PROCEDURE (OUTPATIENT)
Dept: VASCULAR MEDICINE | Facility: CLINIC | Age: 75
End: 2025-04-03
Payer: MEDICARE

## 2025-04-03 DIAGNOSIS — M79.89 LIMB SWELLING: ICD-10-CM

## 2025-04-03 PROCEDURE — 93971 EXTREMITY STUDY: CPT

## 2025-04-03 PROCEDURE — 93971 EXTREMITY STUDY: CPT | Performed by: SURGERY

## 2025-04-03 NOTE — PROGRESS NOTES
Physical Therapy Examination and Treatment Note    Patient Name: Mitra Jeffries  MRN: [id number]  Today's Date: 4/4/2025      Insurance:  Visit number:  1 of 10  Authorization info: N/A - praveena ackerman guidelines  Insurance Type: Payor: MEDICARE / Plan: MEDICARE PART A AND B / Product Type: *No Product type* /     General:  Reason for visit: L TKR 3/12/2025  Referred by: [place]  Current Problem  Problem List Items Addressed This Visit             ICD-10-CM    History of total knee arthroplasty, left Z96.652    Relevant Orders    Follow Up In Physical Therapy       Precautions: no pacemaker  no seizures  no cancer   OTHER: HTN, h/o DVT, h/o PE    First 6 weeks  No pivoting, kneeling, crossing legs, deep squatting, running*, jumping*  · No roll or pillow under knee while in bed*  · Limit hip/knee external rotation in bed - use support on lateral aspect of the knee  -Avoid prolonged positioning with knee flexed   -wear SURYA hose BLE x4 weeks p/o, not at night  -return to driving when off narcotics, MD clearance, L side p/o 3 weeks, R side able to perform SLR 2x10 w/o lag and/or passes driving step test    OTHER:   Red Flags: Do you have any of the following? No  Fever/chills, unexplained weight changes, dizziness/fainting, unexplained change in bowel or bladder functions, unexplained malaise or muscle weakness, night pain/sweats, numbness or tingling    FALLS RISK: low      Medical History Form: Reviewed (scanned into chart)    Subjective   Chief Complaint: Patient presents to clinic s/p L TKR. Notes onset edema, scan yesterday and neg for DVT. Notes edema. Compression socks didn't seem beneficial.   Onset Date: 3/12/25  Soon done with antibiotics and eliquis  Home PT x 2 weeks  Not using walker at home - moved to small apartment, no stairs    Current Condition:   Better    Pain:     Current: 0, stiffness At Worst:   Location: L knee  Description: stiffness    SLEEP: some discomfort due to sleeping on back  Relevant  Information (PMH & Previous Tests/Imaging): CT scan neg DVT  Previous Interventions/Treatments: Physical Therapy    Prior Level of Function (PLOF)  Patient previously independent with all ADLs  Exercise/Physical Activity/Hobbies: PT HEP.   Work/School: retired    Patients Living Environment: Reviewed and no concern  Primary Language: english  Patient's Goal(s) for Therapy: to get around and live normal - go to store together, go shopping without antalgic gait      OBJECTIVE:    OBSERVATION/POSTURE: wfl    PATELLAR MOBILITY: decreased sup/inf, fair med/lat    SKIN: steri strips intact  KNEE ROM: heelslide:       L 8 -90*    GAIT: to department with rollator, gait without A device marked antalgic    CIRCUMFERENCE over spandex: R    43.3         cm    L        51.0          cm    TRANSITIONS/TRANSFERS: guarded    STRENGTH TESTING: on a scale of 0 to 5 unless otherwise noted    HIP  ------                                                                       FLEXION:       R        4                 L            3      KNEE  -------  EXTENSION     R        4+                L             3-  FLEXION          R       5                 L             3    ANKLE  -------  DF                    R   5                   L       4+      PF                    R    5                   L         4-    FUNCTIONAL  -----   SIT TO STAND: UE assist      FUNCTIONAL MOVEMENT:   Return to driving step-test : TBA as needed  Forward step-down test: TBA      Treatment:         PHILL  - reinforce HEP increasing frequency of anti-edema exercises to at least 5x/day. ROM exercises at a minimum 3-5x/day. Incorporate LE elevation 1-2x/day   NMRE    MAN  - lymphatic mobilization   MOD    TA    OTHER      Assessment:  low  complexity     Problem list:  Patient presents to physical therapy presenting with s/p L TKA 3/12/25.     Refer to evaluation for noted clinical impairments and deficits.  Patient is candidate for skilled physical therapy at this  time to address goals as stated to improve function and overall quality of life.   Patient/caregiver have been educated in risks and benefits of physical therapy and demonstrate understanding and positive response to education.   Patient was educated on physical therapy clinical findings, anatomy as it relates to symptoms and diagnosis, and physical therapy plan of care.   Reviewed with patient the availability of estimated cost for physical therapy services and to review cost or co-pay with  staff.    Pt would benefit from physical therapy to address the impairments found & listed previously in the objective section in order to return to safe and pain-free ADLs and prior level of function.      PLAN:  -stationary bike    -isometrics-qs, ham set, glute set  -multi-plane SLR  -hip and core strengthening  -SAQ/TKE  -ham curls  -mini-squats  -heel raises  -sit to stand    -long arc quad    -PROM/AAROM/AROM  -passive extension knee  -heelslides    -weight shifting  -reaching activities  -side-stepping  -stair training/step ups        Care Plan/Goals:  + Gait: discharge assistive device when able to perform SLR with <3*lag, ROM 0-90*, able to complete 5 reps of step-ups, does not demonstrate antalgia  +AROM 0 -110* or higher @ 4-6 weeks post of if required by job duties or hobbies  +performs sit to stand with equal weight bearing  +   LE MMT at 4+/5 or <15% deficit uninvolved LE  +Independent community ambulation with least restrictive assistive device not demonstrating significant deviation that may contribute to discomfort in the future by 3- 6 weeks post op  +able to ascend/descend stairs with a reciprocal pattern with handrail(s).  +single leg stance unsupported stance time greater than or equal to 75 -80% of the contralateral limb or comparable to age matched norms    + Reduce edema  by 1.5 cm for improved mobility and reduced pain  + 30STS within 4 reps of normative value for age/gender with elevated seat  or 1 hand assist  +Functional walking test (TUG or 2MWT) improves to less than 12 sec or improves by 50 feet.  + Independent in a HEP for self-management of any further limitations from prior level of function measured by independent recall of 2 or more exercises  + LEFS or KOOS improves by 6 points indicating MCID or perceived function improves by 10%         Frequency/duration: 2 times per week for 12 weeks for a total of 10 visits with frequency adjusted per progress      Emil Farrell PT, DPT

## 2025-04-04 ENCOUNTER — EVALUATION (OUTPATIENT)
Dept: PHYSICAL THERAPY | Facility: CLINIC | Age: 75
End: 2025-04-04
Payer: MEDICARE

## 2025-04-04 DIAGNOSIS — R53.1 WEAKNESS: ICD-10-CM

## 2025-04-04 DIAGNOSIS — M25.60 LIMITED JOINT RANGE OF MOTION (ROM): ICD-10-CM

## 2025-04-04 DIAGNOSIS — Z96.652 HISTORY OF TOTAL KNEE ARTHROPLASTY, LEFT: ICD-10-CM

## 2025-04-04 DIAGNOSIS — M17.11 PRIMARY OSTEOARTHRITIS OF RIGHT KNEE: Primary | ICD-10-CM

## 2025-04-04 PROCEDURE — 97161 PT EVAL LOW COMPLEX 20 MIN: CPT | Mod: GP

## 2025-04-04 PROCEDURE — 97140 MANUAL THERAPY 1/> REGIONS: CPT | Mod: GP

## 2025-04-08 ENCOUNTER — TREATMENT (OUTPATIENT)
Dept: PHYSICAL THERAPY | Facility: CLINIC | Age: 75
End: 2025-04-08
Payer: MEDICARE

## 2025-04-08 DIAGNOSIS — R60.9 EDEMA: ICD-10-CM

## 2025-04-08 DIAGNOSIS — M25.60 LIMITED JOINT RANGE OF MOTION (ROM): ICD-10-CM

## 2025-04-08 DIAGNOSIS — M17.11 PRIMARY OSTEOARTHRITIS OF RIGHT KNEE: ICD-10-CM

## 2025-04-08 DIAGNOSIS — Z96.652 HISTORY OF TOTAL KNEE ARTHROPLASTY, LEFT: Primary | ICD-10-CM

## 2025-04-08 DIAGNOSIS — R53.1 WEAKNESS: ICD-10-CM

## 2025-04-08 PROCEDURE — 97110 THERAPEUTIC EXERCISES: CPT | Mod: GP

## 2025-04-08 PROCEDURE — 97016 VASOPNEUMATIC DEVICE THERAPY: CPT | Mod: GP

## 2025-04-08 NOTE — PROGRESS NOTES
Physical Therapy Examination and Treatment Note    Patient Name: Mitra Jeffries  MRN: [id number]  Today's Date: 4/8/2025      Insurance:  Visit number:  2 of 10  Authorization info: N/A - praveena ackerman guidelines  Insurance Type: Payor: MEDICARE / Plan: MEDICARE PART A AND B / Product Type: *No Product type* /     General:  Reason for visit: L TKR 3/12/2025  Referred by: [place]  Current Problem  Problem List Items Addressed This Visit             ICD-10-CM    Primary osteoarthritis of right knee M17.11    History of total knee arthroplasty, left - Primary Z96.652    Weakness R53.1    Limited joint range of motion (ROM) M25.60    Edema R60.9         Precautions: no pacemaker  no seizures  no cancer   OTHER: HTN, h/o DVT, h/o PE    First 6 weeks  No pivoting, kneeling, crossing legs, deep squatting, running*, jumping*  · No roll or pillow under knee while in bed*  · Limit hip/knee external rotation in bed - use support on lateral aspect of the knee  -Avoid prolonged positioning with knee flexed   -wear SURYA hose BLE x4 weeks p/o, not at night  -return to driving when off narcotics, MD clearance, L side p/o 3 weeks, R side able to perform SLR 2x10 w/o lag and/or passes driving step test    FALLS RISK: low      Medical History Form: Reviewed (scanned into chart)    Subjective   Chief Complaint: Patient presents to clinic s/p L TKR.   Pain:     Current: 1, stiffness       OBJECTIVE:    OBSERVATION/POSTURE: wfl    PATELLAR MOBILITY: decreased sup/inf, fair med/lat    SKIN: steri strips intact  KNEE ROM: heelslide:       L 8 -90*    GAIT: to department with rollator, gait without A device marked antalgic    CIRCUMFERENCE over spandex: R    43.3         cm    L        51.0          cm    TRANSITIONS/TRANSFERS: guarded    STRENGTH TESTING: on a scale of 0 to 5 unless otherwise noted    HIP  ------                                                                       FLEXION:       R        4                 L            3       KNEE  -------  EXTENSION     R        4+                L             3-  FLEXION          R       5                 L             3    ANKLE  -------  DF                    R   5                   L       4+      PF                    R    5                   L         4-    FUNCTIONAL  -----   SIT TO STAND: UE assist      FUNCTIONAL MOVEMENT:   Return to driving step-test : TBA as needed  Forward step-down test: TBA      Treatment:   PT Therapeutic Procedures Time Entry  Therapeutic Exercise Time Entry: 40  PT Modalities Time Entry  Vasopneumatic Devices Time Entry: 15  PHILL  -recumbent bike st.9 rock to retro to full revolution x 6 min    -multi-plane SLR: hip flex   2x7                      -celio hip add 3s 15x  -s/l clamshells unable  S/l hip abd unable  - supine BKFO x15    -ham curls 2x10  -mini-squats 1x10  -heel raises x20    -long arc quad 2x10    -heelslides AROM       15x        -AAROM-x10, then 5x with 10 sec       NMRE    MAN     MOD -game ready/vasopneumatic compression with cryotherapy low compression target 34 deg x 15 min   TA    OTHER      Assessment:  low  complexity  Tolerated initial session fairly well. Challenged with sidelying position. Able to complete forward revolution on recumbent bike with minimal difficulty.      Problem list:  Patient presents to physical therapy presenting with s/p L TKA 3/12/25.         PLAN:   -hip and core strengthening  -sit to stand  -weight shifting  -reaching activities  -side-stepping  -stair training/step ups        Care Plan/Goals:  Patient's Goal(s) for Therapy: to get around and live normal - go to store together, go shopping without antalgic gait  + Gait: discharge assistive device when able to perform SLR with <3*lag, ROM 0-90*, able to complete 5 reps of step-ups, does not demonstrate antalgia  +AROM 0 -110* or higher @ 4-6 weeks post of if required by job duties or hobbies  +performs sit to stand with equal weight bearing  +   LE MMT at 4+/5 or  <15% deficit uninvolved LE  +Independent community ambulation with least restrictive assistive device not demonstrating significant deviation that may contribute to discomfort in the future by 3- 6 weeks post op  +able to ascend/descend stairs with a reciprocal pattern with handrail(s).  +single leg stance unsupported stance time greater than or equal to 75 -80% of the contralateral limb or comparable to age matched norms    + Reduce edema  by 1.5 cm for improved mobility and reduced pain  + 30STS within 4 reps of normative value for age/gender with elevated seat or 1 hand assist  +Functional walking test (TUG or 2MWT) improves to less than 12 sec or improves by 50 feet.  + Independent in a HEP for self-management of any further limitations from prior level of function measured by independent recall of 2 or more exercises  + LEFS or KOOS improves by 6 points indicating MCID or perceived function improves by 10%         Frequency/duration: 2 times per week for 12 weeks for a total of 10 visits with frequency adjusted per progress      Emil Farrell PT, DPT

## 2025-04-10 NOTE — PROGRESS NOTES
Physical Therapy Examination and Treatment Note    Patient Name: Mitra Jeffries  MRN: [id number]  Today's Date: 4/11/2025      Insurance:  Visit number:  4of 10  Authorization info: N/A - praveena ackerman guidelines  Insurance Type: Payor: MEDICARE / Plan: MEDICARE PART A AND B / Product Type: *No Product type* /     General:  Reason for visit: L TKR 3/12/2025  Referred by: [place]  Current Problem  Problem List Items Addressed This Visit             ICD-10-CM    Primary osteoarthritis of right knee M17.11    History of total knee arthroplasty, left - Primary Z96.652    Weakness R53.1    Limited joint range of motion (ROM) M25.60         Precautions: no pacemaker  no seizures  no cancer   OTHER: HTN, h/o DVT, h/o PE    First 6 weeks  No pivoting, kneeling, crossing legs, deep squatting, running*, jumping*  · No roll or pillow under knee while in bed*  · Limit hip/knee external rotation in bed - use support on lateral aspect of the knee  -Avoid prolonged positioning with knee flexed   -wear SURYA hose BLE x4 weeks p/o, not at night  -return to driving when off narcotics, MD clearance, L side p/o 3 weeks, R side able to perform SLR 2x10 w/o lag and/or passes driving step test    FALLS RISK: low      Medical History Form: Reviewed (scanned into chart)    Subjective   Chief Complaint: Patient presents to clinic s/p L TKR.   Pain: 0 at first then L quad spasm when put foot in pedal. Compliant HEP.        OBJECTIVE:    OBSERVATION/POSTURE: wfl    PATELLAR MOBILITY: decreased sup/inf, fair med/lat    SKIN: steri strips intact  KNEE ROM: heelslide:       L 8 -90*    GAIT: to department with rollator, gait without A device marked antalgic    CIRCUMFERENCE over spandex: R    43.3         cm    L        51.0          cm    TRANSITIONS/TRANSFERS: guarded    STRENGTH TESTING: on a scale of 0 to 5 unless otherwise noted    HIP  ------                                                                       FLEXION:       R        4              "    L            3      KNEE  -------  EXTENSION     R        4+                L             3-  FLEXION          R       5                 L             3    ANKLE  -------  DF                    R   5                   L       4+      PF                    R    5                   L         4-    FUNCTIONAL  -----   SIT TO STAND: UE assist      FUNCTIONAL MOVEMENT:   Return to driving step-test : TBA as needed  Forward step-down test: TBA      Treatment:   PT Therapeutic Procedures Time Entry  Manual Therapy Time Entry: 20  Therapeutic Exercise Time Entry: 25L TKR 3/12/25     PHILL  -recumbent bike st.8 rocking x 6 min    -gastroc incline 3x30 sec  -step ups 4\" 2x8  -hip flex to step tap 12\" x1 then discomfort    -long arc quad 2x10   -QS 2x20    -heelslides AROM       15x        -AAROM-x10, then 10x with 10 sec  - review heelslide HEP  -HEP as below    >>not performed 4/12<<.  -mini-squats 1x12  -heel raises x20  -stand hip abd  -weight shift  -ham curls 2x10     NMRE    MAN     MOD -CP ant/post x10 min, no charge   TA    OTHER ROM knee heelslide:   4/11 AAROM 95*, with manual overpressure 104*     Access Code: [email or web address]  URL: [email or web address]  Date: 04/11/2025  Prepared by: Emil    Exercises  - Supine Heel Slide with Strap  - 2 x daily - 7 x weekly - 5 reps - 30 sec hold  - Long Sitting Quad Set with Towel Roll Under Heel  - 2 x daily - 7 x weekly - 10 reps - 10 sec hold  - Seated Long Arc Quad  - 2 x daily - 7 x weekly - 3 sets - 10-20 reps  - Heel Raises with Counter Support  - 1 x daily - 7 x weekly - 2 sets - 10 reps  - Standing Weight Shift Side to Side  - 1 x daily - 7 x weekly - 2 sets - 10 reps  - Stride Stance Weight Shift  - 1 x daily - 7 x weekly - 2 sets - 10 reps  - Standing Knee Flexion AROM with Chair Support  - 1 x daily - 7 x weekly - 2 sets - 10 reps    Assessment:  low  complexity  Tolerated  session with some discomfort due to muscle spasm.  Soft tissue mobility " improved after manual. Improving knee flexion. Encouraged AAROM heelslide with HEP.   Problem list:  Patient presents to physical therapy presenting with s/p L TKA 3/12/25.     PLAN:   -hip and core strengthening  -sit to stand  -weight shifting  -reaching activities  -side-stepping  -stair training/step ups        Care Plan/Goals:  Patient's Goal(s) for Therapy: to get around and live normal - go to store together, go shopping without antalgic gait  + Gait: discharge assistive device when able to perform SLR with <3*lag, ROM 0-90*, able to complete 5 reps of step-ups, does not demonstrate antalgia  +AROM 0 -110* or higher @ 4-6 weeks post of if required by job duties or hobbies  +performs sit to stand with equal weight bearing  +   LE MMT at 4+/5 or <15% deficit uninvolved LE  +Independent community ambulation with least restrictive assistive device not demonstrating significant deviation that may contribute to discomfort in the future by 3- 6 weeks post op  +able to ascend/descend stairs with a reciprocal pattern with handrail(s).  +single leg stance unsupported stance time greater than or equal to 75 -80% of the contralateral limb or comparable to age matched norms    + Reduce edema  by 1.5 cm for improved mobility and reduced pain  + 30STS within 4 reps of normative value for age/gender with elevated seat or 1 hand assist  +Functional walking test (TUG or 2MWT) improves to less than 12 sec or improves by 50 feet.  + Independent in a HEP for self-management of any further limitations from prior level of function measured by independent recall of 2 or more exercises  + LEFS or KOOS improves by 6 points indicating MCID or perceived function improves by 10%         Frequency/duration: 2 times per week for 12 weeks for a total of 10 visits with frequency adjusted per progress      Emil Farrell PT, DPT

## 2025-04-11 ENCOUNTER — TREATMENT (OUTPATIENT)
Dept: PHYSICAL THERAPY | Facility: CLINIC | Age: 75
End: 2025-04-11
Payer: MEDICARE

## 2025-04-11 DIAGNOSIS — Z96.652 HISTORY OF TOTAL KNEE ARTHROPLASTY, LEFT: Primary | ICD-10-CM

## 2025-04-11 DIAGNOSIS — R53.1 WEAKNESS: ICD-10-CM

## 2025-04-11 DIAGNOSIS — M17.11 PRIMARY OSTEOARTHRITIS OF RIGHT KNEE: ICD-10-CM

## 2025-04-11 DIAGNOSIS — M25.60 LIMITED JOINT RANGE OF MOTION (ROM): ICD-10-CM

## 2025-04-11 PROCEDURE — 97110 THERAPEUTIC EXERCISES: CPT | Mod: GP

## 2025-04-11 PROCEDURE — 97140 MANUAL THERAPY 1/> REGIONS: CPT | Mod: GP

## 2025-04-14 DIAGNOSIS — Z96.651 HISTORY OF TOTAL KNEE ARTHROPLASTY, RIGHT: Primary | ICD-10-CM

## 2025-04-14 RX ORDER — OXYCODONE HYDROCHLORIDE 5 MG/1
5 TABLET ORAL EVERY 6 HOURS PRN
Qty: 28 TABLET | Refills: 0 | Status: SHIPPED | OUTPATIENT
Start: 2025-04-14 | End: 2025-04-21

## 2025-04-14 RX ORDER — TRAMADOL HYDROCHLORIDE 50 MG/1
50 TABLET ORAL EVERY 6 HOURS PRN
Qty: 28 TABLET | Refills: 0 | Status: SHIPPED | OUTPATIENT
Start: 2025-04-14 | End: 2025-04-21

## 2025-04-15 ENCOUNTER — APPOINTMENT (OUTPATIENT)
Dept: PHYSICAL THERAPY | Facility: CLINIC | Age: 75
End: 2025-04-15
Payer: MEDICARE

## 2025-04-15 DIAGNOSIS — Z96.652 HISTORY OF TOTAL KNEE ARTHROPLASTY, LEFT: Primary | ICD-10-CM

## 2025-04-15 DIAGNOSIS — R53.1 WEAKNESS: ICD-10-CM

## 2025-04-15 DIAGNOSIS — M25.60 LIMITED JOINT RANGE OF MOTION (ROM): ICD-10-CM

## 2025-04-15 DIAGNOSIS — M17.11 PRIMARY OSTEOARTHRITIS OF RIGHT KNEE: ICD-10-CM

## 2025-04-17 ENCOUNTER — TREATMENT (OUTPATIENT)
Dept: PHYSICAL THERAPY | Facility: CLINIC | Age: 75
End: 2025-04-17
Payer: MEDICARE

## 2025-04-17 DIAGNOSIS — M25.60 LIMITED JOINT RANGE OF MOTION (ROM): ICD-10-CM

## 2025-04-17 DIAGNOSIS — Z96.652 HISTORY OF TOTAL KNEE ARTHROPLASTY, LEFT: Primary | ICD-10-CM

## 2025-04-17 DIAGNOSIS — M17.11 PRIMARY OSTEOARTHRITIS OF RIGHT KNEE: ICD-10-CM

## 2025-04-17 DIAGNOSIS — R53.1 WEAKNESS: ICD-10-CM

## 2025-04-17 PROCEDURE — 97140 MANUAL THERAPY 1/> REGIONS: CPT | Mod: GP

## 2025-04-17 PROCEDURE — 97110 THERAPEUTIC EXERCISES: CPT | Mod: GP

## 2025-04-17 NOTE — PROGRESS NOTES
Physical Therapy Examination and Treatment Note    Patient Name: Mitra Jeffries  MRN: [id number]  Today's Date: 4/17/2025      Insurance:  Visit number:  5of 10  Authorization info: N/A - praveena ackerman guidelines  Insurance Type: Payor: MEDICARE / Plan: MEDICARE PART A AND B / Product Type: *No Product type* /     General:  Reason for visit: L TKR 3/12/2025  Referred by: [place]  Current Problem  Problem List Items Addressed This Visit           ICD-10-CM    Primary osteoarthritis of right knee M17.11    History of total knee arthroplasty, left - Primary Z96.652    Weakness R53.1    Limited joint range of motion (ROM) M25.60         Precautions: no pacemaker  no seizures  no cancer   OTHER: HTN, h/o DVT, h/o PE    First 6 weeks  No pivoting, kneeling, crossing legs, deep squatting, running*, jumping*  · No roll or pillow under knee while in bed*  · Limit hip/knee external rotation in bed - use support on lateral aspect of the knee  -Avoid prolonged positioning with knee flexed   -wear SURYA hose BLE x4 weeks p/o, not at night  -return to driving when off narcotics, MD clearance, L side p/o 3 weeks, R side able to perform SLR 2x10 w/o lag and/or passes driving step test    FALLS RISK: low      Medical History Form: Reviewed (scanned into chart)    Subjective   Chief Complaint: Patient presents to clinic s/p L TKR.   Pain: improved, had a lot of quad soreness from Fri to Sun.  Wakes sore and ache. But sleeps through night.        OBJECTIVE:    OBSERVATION/POSTURE: wfl    PATELLAR MOBILITY: decreased sup/inf, fair med/lat    SKIN: steri strips intact  KNEE ROM: heelslide:       L 8 -90*    GAIT: to department with rollator, gait without A device marked antalgic    CIRCUMFERENCE over spandex: R    43.3         cm    L        51.0          cm    TRANSITIONS/TRANSFERS: guarded    STRENGTH TESTING: on a scale of 0 to 5 unless otherwise noted    HIP  ------                                                                      "  FLEXION:       R        4                 L            3      KNEE  -------  EXTENSION     R        4+                L             3-  FLEXION          R       5                 L             3    ANKLE  -------  DF                    R   5                   L       4+      PF                    R    5                   L         4-    FUNCTIONAL  -----   SIT TO STAND: UE assist      FUNCTIONAL MOVEMENT:   Return to driving step-test : TBA as needed  Forward step-down test: TBA      Treatment:   PT Therapeutic Procedures Time Entry  Manual Therapy Time Entry: 10  Therapeutic Exercise Time Entry: 35L TKR 3/12/25     PHILL  -recumbent bike st.8 rocking x 6 min    -gastroc incline 3x30 sec  -step ups 4\" 2x10    -mini-squats 2x10  -heel raises x20  -stand hip abd x10 each  _ham curls 2x10 2#  -weight shift s/s 30 sec, f/b 30s ea  -    -long arc quad 2x10 2#   -QS x20    -heelslides AROM    15x        -AAROM-x10, then 5x with 10 sec       NMRE    MAN  STM post/med knee focus   MOD -CP ant/post x10 min, no charge   TA    OTHER ROM knee heelslide:   4/11 AAROM 95*, with manual overpressure 104*   4/17 AAROM 101*, with manual overpressure 107*      Access Code: [email or web address]  URL: [email or web address]  Date: 04/11/2025  Prepared by: Emil Castro  - Supine Heel Slide with Strap  - 2 x daily - 7 x weekly - 5 reps - 30 sec hold  - Long Sitting Quad Set with Towel Roll Under Heel  - 2 x daily - 7 x weekly - 10 reps - 10 sec hold  - Seated Long Arc Quad  - 2 x daily - 7 x weekly - 3 sets - 10-20 reps  - Heel Raises with Counter Support  - 1 x daily - 7 x weekly - 2 sets - 10 reps  - Standing Weight Shift Side to Side  - 1 x daily - 7 x weekly - 2 sets - 10 reps  - Stride Stance Weight Shift  - 1 x daily - 7 x weekly - 2 sets - 10 reps  - Standing Knee Flexion AROM with Chair Support  - 1 x daily - 7 x weekly - 2 sets - 10 reps    Assessment:  low  complexity  Tolerating progression very well. Good " "control with weight shifting. Improving tolerance to step ups. Mild erythema noted - pt instructed to monitor for changes.       Problem list:  Patient presents to physical therapy presenting with s/p L TKA 3/12/25.     PLAN: NV elevated sit to stand, progress step ups to 6\"  -hip and core strengthening  -sit to stand  -weight shifting  -reaching activities  -side-stepping  -stair training/step ups        Care Plan/Goals:  Patient's Goal(s) for Therapy: to get around and live normal - go to store together, go shopping without antalgic gait  + Gait: discharge assistive device when able to perform SLR with <3*lag, ROM 0-90*, able to complete 5 reps of step-ups, does not demonstrate antalgia  +AROM 0 -110* or higher @ 4-6 weeks post of if required by job duties or hobbies  +performs sit to stand with equal weight bearing  +   LE MMT at 4+/5 or <15% deficit uninvolved LE  +Independent community ambulation with least restrictive assistive device not demonstrating significant deviation that may contribute to discomfort in the future by 3- 6 weeks post op  +able to ascend/descend stairs with a reciprocal pattern with handrail(s).  +single leg stance unsupported stance time greater than or equal to 75 -80% of the contralateral limb or comparable to age matched norms    + Reduce edema  by 1.5 cm for improved mobility and reduced pain  + 30STS within 4 reps of normative value for age/gender with elevated seat or 1 hand assist  +Functional walking test (TUG or 2MWT) improves to less than 12 sec or improves by 50 feet.  + Independent in a HEP for self-management of any further limitations from prior level of function measured by independent recall of 2 or more exercises  + LEFS or KOOS improves by 6 points indicating MCID or perceived function improves by 10%         Frequency/duration: 2 times per week for 12 weeks for a total of 10 visits with frequency adjusted per progress      Emil Farrell PT, DPT   "

## 2025-04-20 NOTE — PROGRESS NOTES
Physical Therapy Examination and Treatment Note    Patient Name: Mitra Jeffries  MRN: [id number]  Today's Date: 4/21/2025      Insurance:  Visit number:  6 of 10  Authorization info: N/A - praveena ackerman guidelines  Insurance Type: Payor: MEDICARE / Plan: MEDICARE PART A AND B / Product Type: *No Product type* /     General:  Reason for visit: L TKR 3/12/2025  Referred by: [place]  Current Problem  Problem List Items Addressed This Visit           ICD-10-CM    Primary osteoarthritis of right knee M17.11    History of total knee arthroplasty, left - Primary Z96.652    Weakness R53.1    Limited joint range of motion (ROM) M25.60         Precautions: no pacemaker  no seizures  no cancer   OTHER: HTN, h/o DVT, h/o PE    First 6 weeks  No pivoting, kneeling, crossing legs, deep squatting, running*, jumping*  · No roll or pillow under knee while in bed*  · Limit hip/knee external rotation in bed - use support on lateral aspect of the knee  -Avoid prolonged positioning with knee flexed   -wear SURYA hose BLE x4 weeks p/o, not at night  -return to driving when off narcotics, MD clearance, L side p/o 3 weeks, R side able to perform SLR 2x10 w/o lag and/or passes driving step test    FALLS RISK: low      Medical History Form: Reviewed (scanned into chart)    Subjective   Chief Complaint: Patient presents to clinic s/p L TKR.   Patient reports that the knee is feeling better.  Not using cane at home.          OBJECTIVE:    OBSERVATION/POSTURE: wfl    PATELLAR MOBILITY: decreased sup/inf, fair med/lat    SKIN: steri strips intact  KNEE ROM: heelslide:       L 8 -90*    GAIT: to department with rollator, gait without A device marked antalgic    CIRCUMFERENCE over spandex: R    43.3         cm    L        51.0          cm    TRANSITIONS/TRANSFERS: guarded    STRENGTH TESTING: on a scale of 0 to 5 unless otherwise noted    HIP  ------                                                                       FLEXION:       R        4             "     L            3      KNEE  -------  EXTENSION     R        4+                L             3-  FLEXION          R       5                 L             3    ANKLE  -------  DF                    R   5                   L       4+      PF                    R    5                   L         4-    FUNCTIONAL  -----   SIT TO STAND: UE assist      FUNCTIONAL MOVEMENT:   Return to driving step-test : TBA as needed  Forward step-down test: TBA      Treatment:   PT Therapeutic Procedures Time Entry  Manual Therapy Time Entry: 10  Therapeutic Exercise Time Entry: 30L TKR 3/12/25     PHILL  -recumbent bike st.8  x 6 min (full revolution)  - shuttle press 25#  3 x 10   -gastroc incline 3x30 sec  -step ups 6\" 2x10  --heel raises  2 x 20  -stand hip abd  2 x10 each  Sit to stand chair with airex  x 10   -heelslides AROM    15x           NMRE    MAN  STM post/med knee focus, patellar mobs   MOD    TA    OTHER ROM knee heelslide:   4/11 AAROM 95*, with manual overpressure 104*   4/17 AAROM 101*, with manual overpressure 107*   4/21 AAROM 105 with overpressure 107*     Access Code: [email or web address]  URL: [email or web address]  Date: 04/11/2025  Prepared by: Emil    Exercises  - Supine Heel Slide with Strap  - 2 x daily - 7 x weekly - 5 reps - 30 sec hold  - Long Sitting Quad Set with Towel Roll Under Heel  - 2 x daily - 7 x weekly - 10 reps - 10 sec hold  - Seated Long Arc Quad  - 2 x daily - 7 x weekly - 3 sets - 10-20 reps  - Heel Raises with Counter Support  - 1 x daily - 7 x weekly - 2 sets - 10 reps  - Standing Weight Shift Side to Side  - 1 x daily - 7 x weekly - 2 sets - 10 reps  - Stride Stance Weight Shift  - 1 x daily - 7 x weekly - 2 sets - 10 reps  - Standing Knee Flexion AROM with Chair Support  - 1 x daily - 7 x weekly - 2 sets - 10 reps    Assessment:  low  complexity  Tolerating progression very well.  She was able to progress strengthening exercises by inceasing ht of step as well as performing " sit to stands.  She required VC's for even wt distribution with sit to stands but able to correct with awareness.     Problem list:  Patient presents to physical therapy presenting with s/p L TKA 3/12/25.     PLAN: NV   -hip and core strengthening  -sit to stand  -weight shifting  -reaching activities  -side-stepping  -stair training/step ups        Care Plan/Goals:  Patient's Goal(s) for Therapy: to get around and live normal - go to store together, go shopping without antalgic gait  + Gait: discharge assistive device when able to perform SLR with <3*lag, ROM 0-90*, able to complete 5 reps of step-ups, does not demonstrate antalgia  +AROM 0 -110* or higher @ 4-6 weeks post of if required by job duties or hobbies  +performs sit to stand with equal weight bearing  +   LE MMT at 4+/5 or <15% deficit uninvolved LE  +Independent community ambulation with least restrictive assistive device not demonstrating significant deviation that may contribute to discomfort in the future by 3- 6 weeks post op  +able to ascend/descend stairs with a reciprocal pattern with handrail(s).  +single leg stance unsupported stance time greater than or equal to 75 -80% of the contralateral limb or comparable to age matched norms    + Reduce edema  by 1.5 cm for improved mobility and reduced pain  + 30STS within 4 reps of normative value for age/gender with elevated seat or 1 hand assist  +Functional walking test (TUG or 2MWT) improves to less than 12 sec or improves by 50 feet.  + Independent in a [facility] for self-management of any further limitations from prior level of function measured by independent recall of 2 or more exercises  + LEFS or KOOS improves by 6 points indicating MCID or perceived function improves by 10%         Frequency/duration: 2 times per week for 12 weeks for a total of 10 visits with frequency adjusted per progress      Emil Farrell PT, DPT

## 2025-04-21 ENCOUNTER — TREATMENT (OUTPATIENT)
Dept: PHYSICAL THERAPY | Facility: CLINIC | Age: 75
End: 2025-04-21
Payer: MEDICARE

## 2025-04-21 DIAGNOSIS — Z96.652 HISTORY OF TOTAL KNEE ARTHROPLASTY, LEFT: Primary | ICD-10-CM

## 2025-04-21 DIAGNOSIS — M25.60 LIMITED JOINT RANGE OF MOTION (ROM): ICD-10-CM

## 2025-04-21 DIAGNOSIS — R53.1 WEAKNESS: ICD-10-CM

## 2025-04-21 DIAGNOSIS — M17.11 PRIMARY OSTEOARTHRITIS OF RIGHT KNEE: ICD-10-CM

## 2025-04-21 PROCEDURE — 97140 MANUAL THERAPY 1/> REGIONS: CPT | Mod: GP,CQ

## 2025-04-21 PROCEDURE — 97110 THERAPEUTIC EXERCISES: CPT | Mod: GP,CQ

## 2025-04-23 NOTE — PROGRESS NOTES
Physical Therapy Examination and Treatment Note    Patient Name: Mitra Jeffries  MRN: [id number]  Today's Date: 4/24/2025      Insurance:  Visit number:  7 of 10  Authorization info: N/A - praveena ackerman guidelines  Insurance Type: Payor: MEDICARE / Plan: MEDICARE PART A AND B / Product Type: *No Product type* /     General:  Reason for visit: L TKR 3/12/2025  Referred by: [place]  Current Problem  Problem List Items Addressed This Visit           ICD-10-CM    Primary osteoarthritis of right knee M17.11    History of total knee arthroplasty, left - Primary Z96.652    Weakness R53.1    Limited joint range of motion (ROM) M25.60         Precautions: no pacemaker  no seizures  no cancer   OTHER: HTN, h/o DVT, h/o PE    First 6 weeks  No pivoting, kneeling, crossing legs, deep squatting, running*, jumping*  · No roll or pillow under knee while in bed*  · Limit hip/knee external rotation in bed - use support on lateral aspect of the knee  -Avoid prolonged positioning with knee flexed   -wear SURYA hose BLE x4 weeks p/o, not at night  -return to driving when off narcotics, MD clearance, L side p/o 3 weeks, R side able to perform SLR 2x10 w/o lag and/or passes driving step test    FALLS RISK: low      Medical History Form: Reviewed (scanned into chart)    Subjective   Chief Complaint: Patient presents to clinic s/p L TKR.   Patient reports that she is feeling pretty good.        OBJECTIVE:    OBSERVATION/POSTURE: wfl    PATELLAR MOBILITY: decreased sup/inf, fair med/lat    SKIN: steri strips intact  KNEE ROM: heelslide:       L 8 -90*    GAIT: to department with rollator, gait without A device marked antalgic    CIRCUMFERENCE over spandex: R    43.3         cm    L        51.0          cm    TRANSITIONS/TRANSFERS: guarded    STRENGTH TESTING: on a scale of 0 to 5 unless otherwise noted    HIP  ------                                                                       FLEXION:       R        4                 L            3   "    KNEE  -------  EXTENSION     R        4+                L             3-  FLEXION          R       5                 L             3    ANKLE  -------  DF                    R   5                   L       4+      PF                    R    5                   L         4-    FUNCTIONAL  -----   SIT TO STAND: UE assist      FUNCTIONAL MOVEMENT:   Return to driving step-test : TBA as needed  Forward step-down test: TBA      Treatment:   PT Therapeutic Procedures Time Entry  Manual Therapy Time Entry: 10  Therapeutic Exercise Time Entry: 30L TKR 3/12/25     PHILL  -recumbent bike st.7  x 6 min (full revolution)  - shuttle press 25#  3 x 10   -step ups 6\" 2x10  - step ups 6\" lateral x 10   Small topher step over forward and lateral in bars  LAQ 2#  2 x 10 B  SAQ 2#  10\" x 10   Heelslides x 10  10\"   Step stool flexion stretch            NMRE    MAN  STM post/med knee focus, patellar mobs   MOD    TA    OTHER ROM knee heelslide:   4/11 AAROM 95*, with manual overpressure 104*   4/17 AAROM 101*, with manual overpressure 107*   4/21 AAROM 105 with overpressure 107*       Access Code: [email or web address]  URL: [email or web address]  Date: 04/11/2025  Prepared by: Emil    Exercises  - Supine Heel Slide with Strap  - 2 x daily - 7 x weekly - 5 reps - 30 sec hold  - Long Sitting Quad Set with Towel Roll Under Heel  - 2 x daily - 7 x weekly - 10 reps - 10 sec hold  - Seated Long Arc Quad  - 2 x daily - 7 x weekly - 3 sets - 10-20 reps  - Heel Raises with Counter Support  - 1 x daily - 7 x weekly - 2 sets - 10 reps  - Standing Weight Shift Side to Side  - 1 x daily - 7 x weekly - 2 sets - 10 reps  - Stride Stance Weight Shift  - 1 x daily - 7 x weekly - 2 sets - 10 reps  - Standing Knee Flexion AROM with Chair Support  - 1 x daily - 7 x weekly - 2 sets - 10 reps    Assessment:  low  complexity  Tolerating progression very well.  She has some compensation with hurdles as she needs increased knee flexion.   Problem " list:  Patient presents to physical therapy presenting with s/p L TKA 3/12/25.     PLAN: NV   -hip and core strengthening  -sit to stand  -reaching activities  -side-stepping  -stair training/step ups        Care Plan/Goals:  Patient's Goal(s) for Therapy: to get around and live normal - go to store together, go shopping without antalgic gait  + Gait: discharge assistive device when able to perform SLR with <3*lag, ROM 0-90*, able to complete 5 reps of step-ups, does not demonstrate antalgia  +AROM 0 -110* or higher @ 4-6 weeks post of if required by job duties or hobbies  +performs sit to stand with equal weight bearing  +   LE MMT at 4+/5 or <15% deficit uninvolved LE  +Independent community ambulation with least restrictive assistive device not demonstrating significant deviation that may contribute to discomfort in the future by 3- 6 weeks post op  +able to ascend/descend stairs with a reciprocal pattern with handrail(s).  +single leg stance unsupported stance time greater than or equal to 75 -80% of the contralateral limb or comparable to age matched norms    + Reduce edema  by 1.5 cm for improved mobility and reduced pain  + 30STS within 4 reps of normative value for age/gender with elevated seat or 1 hand assist  +Functional walking test (TUG or 2MWT) improves to less than 12 sec or improves by 50 feet.  + Independent in a [facility] for self-management of any further limitations from prior level of function measured by independent recall of 2 or more exercises  + LEFS or KOOS improves by 6 points indicating MCID or perceived function improves by 10%         Frequency/duration: 2 times per week for 12 weeks for a total of 10 visits with frequency adjusted per progress      Emil Farrell PT, DPT

## 2025-04-24 ENCOUNTER — TREATMENT (OUTPATIENT)
Dept: PHYSICAL THERAPY | Facility: CLINIC | Age: 75
End: 2025-04-24
Payer: MEDICARE

## 2025-04-24 DIAGNOSIS — Z96.652 HISTORY OF TOTAL KNEE ARTHROPLASTY, LEFT: Primary | ICD-10-CM

## 2025-04-24 DIAGNOSIS — M17.11 PRIMARY OSTEOARTHRITIS OF RIGHT KNEE: ICD-10-CM

## 2025-04-24 DIAGNOSIS — M25.60 LIMITED JOINT RANGE OF MOTION (ROM): ICD-10-CM

## 2025-04-24 DIAGNOSIS — R53.1 WEAKNESS: ICD-10-CM

## 2025-04-24 PROCEDURE — 97110 THERAPEUTIC EXERCISES: CPT | Mod: GP,CQ

## 2025-04-24 PROCEDURE — 97140 MANUAL THERAPY 1/> REGIONS: CPT | Mod: GP,CQ

## 2025-04-24 RX ORDER — OXYCODONE HYDROCHLORIDE 5 MG/1
5 TABLET ORAL EVERY 6 HOURS PRN
Qty: 28 TABLET | Refills: 0 | Status: SHIPPED | OUTPATIENT
Start: 2025-04-24 | End: 2025-05-01

## 2025-04-24 RX ORDER — TRAMADOL HYDROCHLORIDE 50 MG/1
50 TABLET ORAL EVERY 6 HOURS PRN
Qty: 28 TABLET | Refills: 0 | Status: SHIPPED | OUTPATIENT
Start: 2025-04-24 | End: 2025-05-01

## 2025-04-29 ENCOUNTER — TREATMENT (OUTPATIENT)
Dept: PHYSICAL THERAPY | Facility: CLINIC | Age: 75
End: 2025-04-29
Payer: MEDICARE

## 2025-04-29 DIAGNOSIS — Z96.652 HISTORY OF TOTAL KNEE ARTHROPLASTY, LEFT: Primary | ICD-10-CM

## 2025-04-29 DIAGNOSIS — R53.1 WEAKNESS: ICD-10-CM

## 2025-04-29 DIAGNOSIS — M17.11 PRIMARY OSTEOARTHRITIS OF RIGHT KNEE: ICD-10-CM

## 2025-04-29 DIAGNOSIS — M25.60 LIMITED JOINT RANGE OF MOTION (ROM): ICD-10-CM

## 2025-04-29 PROCEDURE — 97530 THERAPEUTIC ACTIVITIES: CPT | Mod: GP

## 2025-04-29 PROCEDURE — 97110 THERAPEUTIC EXERCISES: CPT | Mod: GP

## 2025-04-29 NOTE — PROGRESS NOTES
Physical Therapy Examination and Treatment Note    Patient Name: Mitra Jeffries  MRN: [id number]  Today's Date: 4/29/2025      Insurance:  Visit number:  8 of 16  Authorization info: N/A - praveena ackerman guidelines  Insurance Type: Payor: MEDICARE / Plan: MEDICARE PART A AND B / Product Type: *No Product type* /     General:  Reason for visit: L TKR 3/12/2025  Referred by: [place]  Current Problem  Problem List Items Addressed This Visit           ICD-10-CM    Primary osteoarthritis of right knee M17.11    History of total knee arthroplasty, left - Primary Z96.652    Weakness R53.1    Limited joint range of motion (ROM) M25.60         Precautions: no pacemaker  no seizures  no cancer   OTHER: HTN, h/o DVT, h/o PE    FALLS RISK: low      Medical History Form: Reviewed (scanned into chart)    Subjective   Chief Complaint: Patient presents to clinic s/p L TKR.   Sleeping has improved. Walking around house mostly without cane. Increasing general activity. Follow up with surgeon on Friday. Has not yet tried shopping.         OBJECTIVE:   4/29/25    PATELLAR MOBILITY: decreased sup/inf, fair med/lat    SKIN: incision mostly healed, good tissue mobility    KNEE ROM: heelslide:       L 8 -90*                      4/29/25: 5 - 109*  GAIT: single point cane into department, only light use. Good jose j and stride length.     CIRCUMFERENCE over spandex: R    43.3         cm    L        51.0          cm                                                               4/29/25       L  48.0 cm        4/26/25  Mid calf circumference: R        37.2                    L    39.5    active cancer or treatment within the past 6 months - no     paralysis, paresis, or recent cast immobilization of the lower extremities - no     recently bedridden for greater than or equal to 3 days or major surgery within the previous 12 weeks requiring general or regional anesthesia - yes     localized tenderness along the distribution of the deep venous system -  "no     entire leg swelling - yes     calf swelling at least 3cm larger than that on the asymptomatic side (measured 10cm below the tibial tuberosity) - no     pitting edema confined to the symptomatic leg - no     collateral superficial veins (non-varicose) - no     previously documented deep vein thrombosis - yes     alternative diagnosis at least as likely as deep vein thrombosis (deduct 2points if indicated) - no     Wells scoring system for DVT: -2 to 0 low probability                                                    1-2 points moderate probability                                                    3-8 points high probability   Pt advised to monitor edema as she states she had walked considerably. If worsens to call MD/go to ortho.     TRANSITIONS/TRANSFERS: independent    STRENGTH TESTING: on a scale of 0 to 5 unless otherwise noted    HIP  ------                                                                       FLEXION:       R        4                 L            4    KNEE  -------  EXTENSION     R        4+                L             4  FLEXION          R       5                 L             4    ANKLE  -------  DF                    R   5                   L       5      PF                    R    5                   L         5     FUNCTIONAL  -----   SIT TO STAND: independent without UE A    SLS: R      <2 sec           L <2 sec  TU sec without assistive device - shortened stride length, decreased heel to toe off  STAIRS: step over step with heavy use of hand rails  Treatment:   PT Therapeutic Procedures Time Entry  Therapeutic Exercise Time Entry: 10  Therapeutic Activity Time Entry: 35L TKR 3/12/25     PHILL  -recumbent bike st.7  x 6 min (full revolution)    ---did not perform the following on ----  - shuttle press 25#  3 x 10   -step ups 6\" 2x10  - step ups 6\" lateral x 10   Small topher step over forward and lateral in bars  LAQ 2#  2 x 10 B  SAQ 2#  10\" x 10   Heelslides x 10  10\" "   Step stool flexion stretch            NMRE    MAN     MOD    TA  4/29/25 - recheck as above   OTHER ROM knee heelslide:   4/11 AAROM 95*, with manual overpressure 104*   4/17 AAROM 101*, with manual overpressure 107*   4/21 AAROM 105 with overpressure 107*   4/29/25: 5 - 109* with overpressure     Access Code: [email or web address]  URL: [email or web address]  Date: 04/11/2025  Prepared by: Emil    Exercises  - Supine Heel Slide with Strap  - 2 x daily - 7 x weekly - 5 reps - 30 sec hold  - Long Sitting Quad Set with Towel Roll Under Heel  - 2 x daily - 7 x weekly - 10 reps - 10 sec hold  - Seated Long Arc Quad  - 2 x daily - 7 x weekly - 3 sets - 10-20 reps  - Heel Raises with Counter Support  - 1 x daily - 7 x weekly - 2 sets - 10 reps  - Standing Weight Shift Side to Side  - 1 x daily - 7 x weekly - 2 sets - 10 reps  - Stride Stance Weight Shift  - 1 x daily - 7 x weekly - 2 sets - 10 reps  - Standing Knee Flexion AROM with Chair Support  - 1 x daily - 7 x weekly - 2 sets - 10 reps    Assessment:  low  complexity  PROGRESS TO DATE 4/29/30  + Gait: discharge assistive device when able to perform SLR with <3*lag, ROM 0-90*, able to complete 5 reps of step-ups, does not demonstrate antalgia - GOAL MET  +AROM 0 -110* or higher @ 4-6 weeks post of if required by job duties or hobbies - MOSTLY MET  +performs sit to stand with equal weight bearing  +   LE MMT at 4+/5 or <15% deficit uninvolved LE - PARTIALLY MET  +Independent community ambulation with least restrictive assistive device not demonstrating significant deviation that may contribute to discomfort in the future by 3- 6 weeks post op - PARTIALLY MET  +able to ascend/descend stairs with a reciprocal pattern with handrail(s). - Partially met  +single leg stance unsupported stance time greater than or equal to 75 -80% of the contralateral limb or comparable to age matched norms - deficits are significant but symmetrical  + Reduce edema  by 1.5 cm for  improved mobility and reduced pain - GOAL MET  + 30STS within 4 reps of normative value for age/gender with elevated seat or 1 hand assist - GOAL MET able to complete 2 reps without UE assist and normal seat height  +Functional walking test (TUG or 2MWT) improves to less than 12 sec or improves by 50 feet.  + Independent in a HEP for self-management of any further limitations from prior level of function measured by independent recall of 2 or more exercises - ongoing, compliant  + LEFS or KOOS improves by 6 points indicating MCID or perceived function improves by 10% - ongoing    Patient is making steady progress toward goals. ROM is improving with tight feel at end range. Strength and function progressing. Guarded with gait when not using A.D. with limited swing thru. Able to reciprocate on stairs but heavy use of hand rail. Would benefit from skilled PT to improve post operative outcomes.     Problem list:  Patient presents to physical therapy presenting with s/p L TKA 3/12/25.     PLAN: NV   -hip and core strengthening  -sit to stand  -reaching activities  -side-stepping  -stair training/step ups        Care Plan/Goals:  Patient's Goal(s) for Therapy: to get around and live normal - go to store together, go shopping without antalgic gait    + Gait: discharge assistive device when able to perform SLR with <3*lag, ROM 0-90*, able to complete 5 reps of step-ups, does not demonstrate antalgia  +AROM 0 -110* or higher @ 4-6 weeks post of if required by job duties or hobbies  +performs sit to stand with equal weight bearing  +   LE MMT at 4+/5 or <15% deficit uninvolved LE  +Independent community ambulation with least restrictive assistive device not demonstrating significant deviation that may contribute to discomfort in the future by 3- 6 weeks post op  +able to ascend/descend stairs with a reciprocal pattern with handrail(s).  +single leg stance unsupported stance time greater than or equal to 75 -80% of the  contralateral limb or comparable to age matched norms  + Reduce edema  by 1.5 cm for improved mobility and reduced pain  + 30STS within 4 reps of normative value for age/gender with elevated seat or 1 hand assist  +Functional walking test (TUG or 2MWT) improves to less than 12 sec or improves by 50 feet.  + Independent in a HEP for self-management of any further limitations from prior level of function measured by independent recall of 2 or more exercises  + LEFS or KOOS improves by 6 points indicating MCID or perceived function improves by 10%         Frequency/duration: 2 times per week for 16 weeks for a total of 16 visits with frequency adjusted per progress      Emil Farrell PT, DPT

## 2025-05-01 ENCOUNTER — TREATMENT (OUTPATIENT)
Dept: PHYSICAL THERAPY | Facility: CLINIC | Age: 75
End: 2025-05-01
Payer: MEDICARE

## 2025-05-01 DIAGNOSIS — R53.1 WEAKNESS: ICD-10-CM

## 2025-05-01 DIAGNOSIS — M25.60 LIMITED JOINT RANGE OF MOTION (ROM): ICD-10-CM

## 2025-05-01 DIAGNOSIS — M17.11 PRIMARY OSTEOARTHRITIS OF RIGHT KNEE: ICD-10-CM

## 2025-05-01 DIAGNOSIS — Z96.652 HISTORY OF TOTAL KNEE ARTHROPLASTY, LEFT: Primary | ICD-10-CM

## 2025-05-01 PROCEDURE — 97110 THERAPEUTIC EXERCISES: CPT | Mod: GP

## 2025-05-01 NOTE — PROGRESS NOTES
Physical Therapy Examination and Treatment Note    Patient Name: Mitra Jeffries  MRN: [id number]  Today's Date: 5/1/2025      Insurance:  Visit number:  8 of 16  Authorization info: N/A - praveena ackerman guidelines  Insurance Type: Payor: MEDICARE / Plan: MEDICARE PART A AND B / Product Type: *No Product type* /     General:  Reason for visit: L TKR 3/12/2025  Referred by: [place]  Current Problem  Problem List Items Addressed This Visit           ICD-10-CM    Primary osteoarthritis of right knee M17.11    History of total knee arthroplasty, left - Primary Z96.652    Weakness R53.1    Limited joint range of motion (ROM) M25.60     Precautions: no pacemaker  no seizures  no cancer   OTHER: HTN, h/o DVT, h/o PE    FALLS RISK: low    Subjective   Chief Complaint: Patient presents to clinic s/p L TKR.   Edema has improved. No new complaints. MD follow up tomorrow.     OBJECTIVE:   4/29/25    PATELLAR MOBILITY: decreased sup/inf, fair med/lat    SKIN: incision mostly healed, good tissue mobility    KNEE ROM: heelslide:       L 8 -90*                      4/29/25: 5 - 109*  GAIT: single point cane into department, only light use. Good jose j and stride length.     CIRCUMFERENCE over spandex: R    43.3         cm    L        51.0          cm                                                               4/29/25       L  48.0 cm        4/26/25  Mid calf circumference: R        37.2                    L    39.5      TRANSITIONS/TRANSFERS: independent    STRENGTH TESTING: on a scale of 0 to 5 unless otherwise noted    HIP  ------                                                                       FLEXION:       R        4                 L            4    KNEE  -------  EXTENSION     R        4+                L             4  FLEXION          R       5                 L             4    ANKLE  -------  DF                    R   5                   L       5      PF                    R    5                   L         5  "    FUNCTIONAL  -----   SIT TO STAND: independent without UE A    SLS: R      <2 sec           L <2 sec  TU sec without assistive device - shortened stride length, decreased heel to toe off  STAIRS: step over step with heavy use of hand rails  Treatment:   PT Therapeutic Procedures Time Entry  Therapeutic Exercise Time Entry: 42L TKR 3/12/25     PHILL  -recumbent bike st.7  x 6 min (full revolution)    - shuttle press 37#  3 x 10   -step ups 6\" 2x10  - step ups 6\" lateral x 10   -step down eccentric lowering  x10  -stair flexion stretch x10 then 3x20s hold  Small topher step over forward and lateral in bars - single UE support x 4 bouts ea  Stand ham curl 4# 2x10  LAQ 4#  2 x 10 L, then 10s hold 5sec  Heelslides x 10  then 5x at 10\"                 NMRE    MAN     MOD    TA  25 - recheck as above   OTHER ROM knee heelslide:    AAROM 95*, with manual overpressure 104*    AAROM 101*, with manual overpressure 107*    AAROM 105 with overpressure 107*   25: 5 - 109* with overpressure     Access Code: [email or web address]  URL: [email or web address]  Date: 2025  Prepared by: Emil Castro  - Supine Heel Slide with Strap  - 2 x daily - 7 x weekly - 5 reps - 30 sec hold  - Long Sitting Quad Set with Towel Roll Under Heel  - 2 x daily - 7 x weekly - 10 reps - 10 sec hold  - Seated Long Arc Quad  - 2 x daily - 7 x weekly - 3 sets - 10-20 reps  - Heel Raises with Counter Support  - 1 x daily - 7 x weekly - 2 sets - 10 reps  - Standing Weight Shift Side to Side  - 1 x daily - 7 x weekly - 2 sets - 10 reps  - Stride Stance Weight Shift  - 1 x daily - 7 x weekly - 2 sets - 10 reps  - Standing Knee Flexion AROM with Chair Support  - 1 x daily - 7 x weekly - 2 sets - 10 reps    Assessment:  low  complexity    Patient is making steady progress toward goals. Good tolerance to progression today. Function is improving but still dependent on cane and heavy use of UE with stair activities. " Guarded with topher mobility.   Problem list:  Patient presents to physical therapy presenting with s/p L TKA 3/12/25.     PLAN: NV   -hip and core strengthening  -sit to stand  -reaching activities  -side-stepping  -stair training/step ups        Care Plan/Goals:  Patient's Goal(s) for Therapy: to get around and live normal - go to store together, go shopping without antalgic gait  Updated    4/29/30  + Gait: discharge assistive device when able to perform SLR with <3*lag, ROM 0-90*, able to complete 5 reps of step-ups, does not demonstrate antalgia - GOAL MET  +AROM 0 -110* or higher @ 4-6 weeks post of if required by job duties or hobbies - MOSTLY MET  +performs sit to stand with equal weight bearing  +   LE MMT at 4+/5 or <15% deficit uninvolved LE - PARTIALLY MET  +Independent community ambulation with least restrictive assistive device not demonstrating significant deviation that may contribute to discomfort in the future by 3- 6 weeks post op - PARTIALLY MET  +able to ascend/descend stairs with a reciprocal pattern with handrail(s). - Partially met  +single leg stance unsupported stance time greater than or equal to 75 -80% of the contralateral limb or comparable to age matched norms - deficits are significant but symmetrical  + Reduce edema  by 1.5 cm for improved mobility and reduced pain - GOAL MET  + 30STS within 4 reps of normative value for age/gender with elevated seat or 1 hand assist - GOAL MET able to complete 2 reps without UE assist and normal seat height  +Functional walking test (TUG or 2MWT) improves to less than 12 sec or improves by 50 feet.  + Independent in a HEP for self-management of any further limitations from prior level of function measured by independent recall of 2 or more exercises - ongoing, compliant  + LEFS or KOOS improves by 6 points indicating MCID or perceived function improves by 10% - ongoing    Frequency/duration: 2 times per week for 16 weeks for a total of 16 visits  with frequency adjusted per progress      Emil Farrell PT, DPT

## 2025-05-02 ENCOUNTER — APPOINTMENT (OUTPATIENT)
Dept: ORTHOPEDIC SURGERY | Facility: CLINIC | Age: 75
End: 2025-05-02
Payer: MEDICARE

## 2025-05-02 DIAGNOSIS — M25.562 CHRONIC PAIN OF LEFT KNEE: Primary | ICD-10-CM

## 2025-05-02 DIAGNOSIS — G89.29 CHRONIC PAIN OF LEFT KNEE: Primary | ICD-10-CM

## 2025-05-05 ENCOUNTER — TREATMENT (OUTPATIENT)
Dept: PHYSICAL THERAPY | Facility: CLINIC | Age: 75
End: 2025-05-05
Payer: MEDICARE

## 2025-05-05 DIAGNOSIS — M17.11 PRIMARY OSTEOARTHRITIS OF RIGHT KNEE: ICD-10-CM

## 2025-05-05 DIAGNOSIS — M25.60 LIMITED JOINT RANGE OF MOTION (ROM): ICD-10-CM

## 2025-05-05 DIAGNOSIS — R53.1 WEAKNESS: ICD-10-CM

## 2025-05-05 DIAGNOSIS — Z96.652 HISTORY OF TOTAL KNEE ARTHROPLASTY, LEFT: Primary | ICD-10-CM

## 2025-05-05 PROCEDURE — 97140 MANUAL THERAPY 1/> REGIONS: CPT | Mod: GP,CQ

## 2025-05-05 PROCEDURE — 97110 THERAPEUTIC EXERCISES: CPT | Mod: GP,CQ

## 2025-05-05 NOTE — PROGRESS NOTES
Physical Therapy Examination and Treatment Note    Patient Name: Mitra Jeffries  MRN: [id number]  Today's Date: 5/5/2025      Insurance:  Visit number:  9 of 16  Authorization info: N/A - praveena ackerman guidelines  Insurance Type: Payor: MEDICARE / Plan: MEDICARE PART A AND B / Product Type: *No Product type* /     General:  Reason for visit: L TKR 3/12/2025  Referred by: [place]  Current Problem  Problem List Items Addressed This Visit           ICD-10-CM    Primary osteoarthritis of right knee M17.11    History of total knee arthroplasty, left - Primary Z96.652    Weakness R53.1    Limited joint range of motion (ROM) M25.60     Precautions: no pacemaker  no seizures  no cancer   OTHER: HTN, h/o DVT, h/o PE    FALLS RISK: low    Subjective   Chief Complaint: Patient presents to clinic s/p L TKR.   Knee is still stiff but isn't very painful.   Canceled appt and now she is going this Friday. I feel like Im getting stronger.  Was able to cook on Sat.     OBJECTIVE:   4/29/25    PATELLAR MOBILITY: decreased sup/inf, fair med/lat    SKIN: incision mostly healed, good tissue mobility    KNEE ROM: heelslide:       L 8 -90*                      4/29/25: 5 - 109*  GAIT: single point cane into department, only light use. Good jose j and stride length.     CIRCUMFERENCE over spandex: R    43.3         cm    L        51.0          cm                                                               4/29/25       L  48.0 cm        4/26/25  Mid calf circumference: R        37.2                    L    39.5      TRANSITIONS/TRANSFERS: independent    STRENGTH TESTING: on a scale of 0 to 5 unless otherwise noted    HIP  ------                                                                       FLEXION:       R        4                 L            4    KNEE  -------  EXTENSION     R        4+                L             4  FLEXION          R       5                 L             4    ANKLE  -------  DF                    R   5           "         L       5      PF                    R    5                   L         5     FUNCTIONAL  -----   SIT TO STAND: independent without UE A    SLS: R      <2 sec           L <2 sec  TU sec without assistive device - shortened stride length, decreased heel to toe off  STAIRS: step over step with heavy use of hand rails  Treatment:   PT Therapeutic Procedures Time Entry  Manual Therapy Time Entry: 10  Therapeutic Exercise Time Entry: 29L TKR 3/12/25     PHILL  -recumbent bike st.7  x 6 min (full revolution)    - shuttle press 37#  3 x 12  -step ups 6\" 2x10  - step ups 6\" lateral x 10   -step down eccentric lowering  x10  4\" step   -stair flexion stretch x10 then 3x20s hold  -Stand ham curl 4# 3x10  Standing hip abd 2#  3 x 10 B  LAQ 4#  3 x 10 L  Heelslides x 10  then 5x at 10\"                 NMRE    MAN     MOD    TA     OTHER ROM knee heelslide:    AAROM 95*, with manual overpressure 104*    AAROM 101*, with manual overpressure 107*    AAROM 105 with overpressure 107*   25: 5 - 109* with overpressure     Access Code: [email or web address]  URL: [email or web address]  Date: 2025  Prepared by: Emil    Exercises  - Supine Heel Slide with Strap  - 2 x daily - 7 x weekly - 5 reps - 30 sec hold  - Long Sitting Quad Set with Towel Roll Under Heel  - 2 x daily - 7 x weekly - 10 reps - 10 sec hold  - Seated Long Arc Quad  - 2 x daily - 7 x weekly - 3 sets - 10-20 reps  - Heel Raises with Counter Support  - 1 x daily - 7 x weekly - 2 sets - 10 reps  - Standing Weight Shift Side to Side  - 1 x daily - 7 x weekly - 2 sets - 10 reps  - Stride Stance Weight Shift  - 1 x daily - 7 x weekly - 2 sets - 10 reps  - Standing Knee Flexion AROM with Chair Support  - 1 x daily - 7 x weekly - 2 sets - 10 reps    Assessment:  low  complexity    Patient is making steady progress toward goals. Good tolerance to progression today. She is moving away from use of cane on flat surfaces.    Problem " list:  Patient presents to physical therapy presenting with s/p L TKA 3/12/25.     PLAN: NV   -hip and core strengthening  -sit to stand  -reaching activities  -side-stepping  -stair training/step ups        Care Plan/Goals:  Patient's Goal(s) for Therapy: to get around and live normal - go to store together, go shopping without antalgic gait  Updated    4/29/30  + Gait: discharge assistive device when able to perform SLR with <3*lag, ROM 0-90*, able to complete 5 reps of step-ups, does not demonstrate antalgia - GOAL MET  +AROM 0 -110* or higher @ 4-6 weeks post of if required by job duties or hobbies - MOSTLY MET  +performs sit to stand with equal weight bearing  +   LE MMT at 4+/5 or <15% deficit uninvolved LE - PARTIALLY MET  +Independent community ambulation with least restrictive assistive device not demonstrating significant deviation that may contribute to discomfort in the future by 3- 6 weeks post op - PARTIALLY MET  +able to ascend/descend stairs with a reciprocal pattern with handrail(s). - Partially met  +single leg stance unsupported stance time greater than or equal to 75 -80% of the contralateral limb or comparable to age matched norms - deficits are significant but symmetrical  + Reduce edema  by 1.5 cm for improved mobility and reduced pain - GOAL MET  + 30STS within 4 reps of normative value for age/gender with elevated seat or 1 hand assist - GOAL MET able to complete 2 reps without UE assist and normal seat height  +Functional walking test (TUG or 2MWT) improves to less than 12 sec or improves by 50 feet.  + Independent in a HEP for self-management of any further limitations from prior level of function measured by independent recall of 2 or more exercises - ongoing, compliant  + LEFS or KOOS improves by 6 points indicating MCID or perceived function improves by 10% - ongoing    Frequency/duration: 2 times per week for 16 weeks for a total of 16 visits with frequency adjusted per  progress      Emil Farrell PT, DPT

## 2025-05-06 ENCOUNTER — APPOINTMENT (OUTPATIENT)
Dept: PRIMARY CARE | Facility: CLINIC | Age: 75
End: 2025-05-06
Payer: MEDICARE

## 2025-05-06 DIAGNOSIS — I10 PRIMARY HYPERTENSION: ICD-10-CM

## 2025-05-06 DIAGNOSIS — M17.12 PRIMARY OSTEOARTHRITIS OF LEFT KNEE: Primary | ICD-10-CM

## 2025-05-06 DIAGNOSIS — Z96.652 HISTORY OF TOTAL KNEE ARTHROPLASTY, LEFT: ICD-10-CM

## 2025-05-06 DIAGNOSIS — F41.9 ANXIETY: ICD-10-CM

## 2025-05-06 DIAGNOSIS — I44.7 LEFT BUNDLE BRANCH BLOCK: ICD-10-CM

## 2025-05-06 DIAGNOSIS — Z96.651 HISTORY OF TOTAL KNEE ARTHROPLASTY, RIGHT: ICD-10-CM

## 2025-05-06 DIAGNOSIS — E87.6 HYPOKALEMIA: ICD-10-CM

## 2025-05-06 DIAGNOSIS — Z51.81 THERAPEUTIC DRUG MONITORING: ICD-10-CM

## 2025-05-06 PROCEDURE — 3078F DIAST BP <80 MM HG: CPT | Performed by: INTERNAL MEDICINE

## 2025-05-06 PROCEDURE — 3075F SYST BP GE 130 - 139MM HG: CPT | Performed by: INTERNAL MEDICINE

## 2025-05-06 PROCEDURE — 1160F RVW MEDS BY RX/DR IN RCRD: CPT | Performed by: INTERNAL MEDICINE

## 2025-05-06 PROCEDURE — G2211 COMPLEX E/M VISIT ADD ON: HCPCS | Performed by: INTERNAL MEDICINE

## 2025-05-06 PROCEDURE — 99213 OFFICE O/P EST LOW 20 MIN: CPT | Performed by: INTERNAL MEDICINE

## 2025-05-06 PROCEDURE — 1159F MED LIST DOCD IN RCRD: CPT | Performed by: INTERNAL MEDICINE

## 2025-05-06 NOTE — PROGRESS NOTES
"Subjective   Patient ID: Mitra Jeffries is a 75 y.o. female who presents for Follow-up.  HPI  Left knee TKA did well  Cardiology ok T wave inversion LBBB  Covid home tested   Moved [place] down[place] home  No wine   Anxiety disorder well-controlled with diazepam    Review of Systems   Respiratory: Negative.     Cardiovascular: Negative.    Musculoskeletal:  Positive for arthralgias.        As in HPI   Psychiatric/Behavioral: Negative.  Negative for dysphoric mood. The patient is not nervous/anxious.        Objective   Physical Exam  Constitutional:       General: She is not in acute distress.  Cardiovascular:      Rate and Rhythm: Normal rate and regular rhythm.      Pulses: Normal pulses.      Heart sounds: Normal heart sounds.   Pulmonary:      Effort: Pulmonary effort is normal.      Breath sounds: Normal breath sounds.   Musculoskeletal:      Comments: Bilateral knee surgical scars   Neurological:      General: No focal deficit present.      Mental Status: She is alert.   Psychiatric:         Mood and Affect: Mood normal.         Behavior: Behavior normal.         Thought Content: Thought content normal.       /80   Pulse 86   Ht 1.575 m (5' 2\")   Wt 79.8 kg (176 lb)   SpO2 95%   BMI 32.19 kg/m²   BP Readings from Last 5 Encounters:   05/06/25 134/80   03/21/25 138/86   03/18/25 152/86   03/15/25 120/78   03/13/25 122/65     Wt Readings from Last 5 Encounters:   05/06/25 79.8 kg (176 lb)   03/14/25 79.8 kg (176 lb)   03/12/25 85.9 kg (189 lb 6 oz)   03/05/25 81.2 kg (179 lb)   02/19/25 81.2 kg (179 lb)       Data  Lab 3/13/2025 glucose 167, potassium 3.3, creatinine 0.5/GFR greater than 90, CBC and CMP satisfactory  12/20/2024 hemoglobin A1c 5.2%  ECG 2/19/2025 NSR, left bundle branch block, inferior T wave inversion not significant also 11/20/2024  Echocardiogram 3/5/2025 scheduled  Urine drug screen 12/19/2024 as expected  Controlled substance agreement 12/20/2024  Cardiology consult " 3/5/2025  Hospital discharge summary 3/12/2025 reviewed  Ortho follow-up 3/31/2025 reviewed  Home care and physical therapy reviewed    Assessment/Plan     Status post left total knee arthroplasty doing well  Hypertension doing well  Left bundle branch block reviewed  T wave inversion reviewed-perhaps low potassium or left ventricular hypertrophy with strain  Echocardiogram scheduled  Chronic anxiety disorder doing well on benzodiazepine therapy, safe effective well-tolerated  Adjustment in living conditions appropriate    Problem List Items Addressed This Visit       Anxiety    Hypertension    Therapeutic drug monitoring    Primary osteoarthritis of left knee - Primary    History of total knee arthroplasty, left    Hypokalemia    History of total knee arthroplasty, right    Left bundle branch block

## 2025-05-07 ENCOUNTER — TELEPHONE (OUTPATIENT)
Dept: PRIMARY CARE | Facility: CLINIC | Age: 75
End: 2025-05-07
Payer: MEDICARE

## 2025-05-07 DIAGNOSIS — F41.9 ANXIETY: ICD-10-CM

## 2025-05-07 DIAGNOSIS — I10 HYPERTENSION, UNSPECIFIED TYPE: ICD-10-CM

## 2025-05-07 RX ORDER — DIAZEPAM 2 MG/1
2 TABLET ORAL EVERY 8 HOURS PRN
Qty: 90 TABLET | Refills: 2 | Status: SHIPPED | OUTPATIENT
Start: 2025-05-07 | End: 2025-08-05

## 2025-05-08 ENCOUNTER — TREATMENT (OUTPATIENT)
Dept: PHYSICAL THERAPY | Facility: CLINIC | Age: 75
End: 2025-05-08
Payer: MEDICARE

## 2025-05-08 DIAGNOSIS — Z96.652 HISTORY OF TOTAL KNEE ARTHROPLASTY, LEFT: Primary | ICD-10-CM

## 2025-05-08 DIAGNOSIS — M25.60 LIMITED JOINT RANGE OF MOTION (ROM): ICD-10-CM

## 2025-05-08 DIAGNOSIS — R53.1 WEAKNESS: ICD-10-CM

## 2025-05-08 DIAGNOSIS — M17.11 PRIMARY OSTEOARTHRITIS OF RIGHT KNEE: ICD-10-CM

## 2025-05-08 PROCEDURE — 97110 THERAPEUTIC EXERCISES: CPT | Mod: GP,CQ

## 2025-05-08 NOTE — PROGRESS NOTES
Physical Therapy Examination and Treatment Note    Patient Name: Mitra Jeffries  MRN: [id number]  Today's Date: 5/8/2025      Insurance:  Visit number:  11 of 16  Authorization info: N/A - praveena ackerman guidelines  Insurance Type: Payor: MEDICARE / Plan: MEDICARE PART A AND B / Product Type: *No Product type* /     General:  Reason for visit: L TKR 3/12/2025  Referred by: [place]  Current Problem  Problem List Items Addressed This Visit           ICD-10-CM    Primary osteoarthritis of right knee M17.11    History of total knee arthroplasty, left - Primary Z96.652    Weakness R53.1    Limited joint range of motion (ROM) M25.60     Precautions: no pacemaker  no seizures  no cancer   OTHER: HTN, h/o DVT, h/o PE    FALLS RISK: low    Subjective   Chief Complaint: Patient presents to clinic s/p L TKR.   Continues to have stiffness but its not painful.    OBJECTIVE:   4/29/25    PATELLAR MOBILITY: decreased sup/inf, fair med/lat    SKIN: incision mostly healed, good tissue mobility    KNEE ROM: heelslide:       L 8 -90*                      4/29/25: 5 - 109*  GAIT: single point cane into department, only light use. Good jose j and stride length.     CIRCUMFERENCE over spandex: R    43.3         cm    L        51.0          cm                                                               4/29/25       L  48.0 cm        4/26/25  Mid calf circumference: R        37.2                    L    39.5      TRANSITIONS/TRANSFERS: independent    STRENGTH TESTING: on a scale of 0 to 5 unless otherwise noted    HIP  ------                                                                       FLEXION:       R        4                 L            4    KNEE  -------  EXTENSION     R        4+                L             4  FLEXION          R       5                 L             4    ANKLE  -------  DF                    R   5                   L       5      PF                    R    5                   L         5     FUNCTIONAL  -----  "  SIT TO STAND: independent without UE A    SLS: R      <2 sec           L <2 sec  TU sec without assistive device - shortened stride length, decreased heel to toe off  STAIRS: step over step with heavy use of hand rails  Treatment:   PT Therapeutic Procedures Time Entry  Therapeutic Exercise Time Entry: 40L TKR 3/12/25     PHILL  -recumbent bike st.7  x 6 min (full revolution)    - shuttle press 37#  3 x 12  SL shuttle 18#  2 x 10 B  -step ups 6\" 2x10  - step ups 6\" lateral x 10   -step down eccentric lowering  x10  4\" step   -stair flexion stretch x10 then 3x20s hold   ham curl GTB  3x10  Standing hip abd 2#  3 x 10 B  LAQ 5#  3 x 10 L  SLR  2 x 10  Heelslides x 10  then 5x at 10\"                 NMRE    MAN     MOD    TA     OTHER ROM knee heelslide:    AAROM 95*, with manual overpressure 104*    AAROM 101*, with manual overpressure 107*    AAROM 105 with overpressure 107*   25: 5 - 109* with overpressure     Access Code: [email or web address]  URL: [email or web address]  Date: 2025  Prepared by: Emil    Exercises  - Supine Heel Slide with Strap  - 2 x daily - 7 x weekly - 5 reps - 30 sec hold  - Long Sitting Quad Set with Towel Roll Under Heel  - 2 x daily - 7 x weekly - 10 reps - 10 sec hold  - Seated Long Arc Quad  - 2 x daily - 7 x weekly - 3 sets - 10-20 reps  - Heel Raises with Counter Support  - 1 x daily - 7 x weekly - 2 sets - 10 reps  - Standing Weight Shift Side to Side  - 1 x daily - 7 x weekly - 2 sets - 10 reps  - Stride Stance Weight Shift  - 1 x daily - 7 x weekly - 2 sets - 10 reps  - Standing Knee Flexion AROM with Chair Support  - 1 x daily - 7 x weekly - 2 sets - 10 reps    Assessment:  low  complexity    Patient is making steady progress toward goals. Good tolerance to progression today. She is moving away from use of cane on flat surfaces.    Problem list:  Patient presents to physical therapy presenting with s/p L TKA 3/12/25.     PLAN: NV   -hip and " core strengthening  -sit to stand  -reaching activities  -side-stepping  -stair training/step ups        Care Plan/Goals:  Patient's Goal(s) for Therapy: to get around and live normal - go to store together, go shopping without antalgic gait  Updated    4/29/30  + Gait: discharge assistive device when able to perform SLR with <3*lag, ROM 0-90*, able to complete 5 reps of step-ups, does not demonstrate antalgia - GOAL MET  +AROM 0 -110* or higher @ 4-6 weeks post of if required by job duties or hobbies - MOSTLY MET  +performs sit to stand with equal weight bearing  +   LE MMT at 4+/5 or <15% deficit uninvolved LE - PARTIALLY MET  +Independent community ambulation with least restrictive assistive device not demonstrating significant deviation that may contribute to discomfort in the future by 3- 6 weeks post op - PARTIALLY MET  +able to ascend/descend stairs with a reciprocal pattern with handrail(s). - Partially met  +single leg stance unsupported stance time greater than or equal to 75 -80% of the contralateral limb or comparable to age matched norms - deficits are significant but symmetrical  + Reduce edema  by 1.5 cm for improved mobility and reduced pain - GOAL MET  + 30STS within 4 reps of normative value for age/gender with elevated seat or 1 hand assist - GOAL MET able to complete 2 reps without UE assist and normal seat height  +Functional walking test (TUG or 2MWT) improves to less than 12 sec or improves by 50 feet.  + Independent in a [facility] for self-management of any further limitations from prior level of function measured by independent recall of 2 or more exercises - ongoing, compliant  + LEFS or KOOS improves by 6 points indicating MCID or perceived function improves by 10% - ongoing    Frequency/duration: 2 times per week for 16 weeks for a total of 16 visits with frequency adjusted per progress      Emil Farrell PT, DPT

## 2025-05-09 ENCOUNTER — HOSPITAL ENCOUNTER (OUTPATIENT)
Dept: RADIOLOGY | Facility: CLINIC | Age: 75
Discharge: HOME | End: 2025-05-09
Payer: MEDICARE

## 2025-05-09 ENCOUNTER — OFFICE VISIT (OUTPATIENT)
Dept: ORTHOPEDIC SURGERY | Facility: CLINIC | Age: 75
End: 2025-05-09
Payer: MEDICARE

## 2025-05-09 DIAGNOSIS — M25.562 CHRONIC PAIN OF LEFT KNEE: ICD-10-CM

## 2025-05-09 DIAGNOSIS — G89.29 CHRONIC PAIN OF LEFT KNEE: ICD-10-CM

## 2025-05-09 DIAGNOSIS — Z96.652 HISTORY OF TOTAL KNEE ARTHROPLASTY, LEFT: ICD-10-CM

## 2025-05-09 PROCEDURE — 73562 X-RAY EXAM OF KNEE 3: CPT | Mod: LT

## 2025-05-09 PROCEDURE — 99211 OFF/OP EST MAY X REQ PHY/QHP: CPT | Performed by: STUDENT IN AN ORGANIZED HEALTH CARE EDUCATION/TRAINING PROGRAM

## 2025-05-09 ASSESSMENT — PAIN SCALES - GENERAL: PAINLEVEL_OUTOF10: 0 - NO PAIN

## 2025-05-09 ASSESSMENT — PAIN - FUNCTIONAL ASSESSMENT: PAIN_FUNCTIONAL_ASSESSMENT: 0-10

## 2025-05-09 NOTE — PROGRESS NOTES
"DEVEN/TKA Related Summary           L hip: N  L knee  3/13/25: Primary TKA (myself at )  R hip: N  R knee  1/7/2017: Primary TKA (Dr. Dr. Rosales at [facility])  Lumbar surgery or significant pathology: N              CC/SUBJECTIVE/HPI            Mitra Jeffries is a 74 y.o. female following up regarding:     L knee: S/p L Primary TKA  Reports outcome as good  Pain mgmt: OTC meds  Assistive device: none  Able to navigate stairs: Y  Sleeping normally: Y  PT: yes, current (2/wk)            HISTORIES            Reports no major changes in health, substance use, or baseline medications.            OBJECTIVE            Physical exam  Estimated body mass index is 32.19 kg/m² as calculated from the following:    Height as of 3/14/25: 1.575 m (5' 2\").    Weight as of 3/14/25: 79.8 kg (176 lb).  Gen/psych: NAD, conversational, appropriate    Ambulation  Gait: Nonantalgic  Assistive device: walker  Spine: Unchanged from previous visit     Focused MSK exam: L  TKA  Skin/Incision: well healed  Effusion: trace  Alignment: neutral  Tenderness: none  Extension: passive flexion contracture 0° and active extension lag 0°  Flexion: 120º  Flexion stability: stable  Varus/Valgus stability: stable  Referred pain to knee with hip 90° flexion and 45° ER/IR: neg  Neurovascular             Strength: 5/5 hip/knee/ankle flexion and extension  Sensory (L2-S1): SILT throughout lower extremity  Edema/stasis: Diffuse swelling with no pitting  Pulse: DP 1+, PT 1+     Imaging  5/9/2025 L knee radiographs ([facility], [facility] AP/lateral) on my read: TKA components in acceptable position with no sign of gross implant/hardware failure.              ASSESSMENT & PLAN           L Primary TKA  Activity/Therapy/Incision: Continue low impact exercise and weight mgmt. May swim/bathe now that incision healed.  Pain: OTCs, ice as needed  SURYA/ACE: PRN for operative extremity swelling. Noticeable swelling on exam at 3/31/2025 appointment.  Reports " "compliance with Eliquis. Doppler ruled out DVT.  Today,swelling is still mildly present but has improved.  Follow-up: 3mo postop. She and  are very pleased with outcome.  R Primary TKA (Dr. Rosales, 2017)  Doing well  Follow-up: Interval XR in 5yrs (2029)  PMH, PSH, other considerations discussed  BMI<40 but on spectrum of increased risk of surgical complications.  Hx DVT/PE (\"very small\" clot in calf following previous surgery)  PCN allergy (hives)  [employer]  Note: Daughter of my existing pt, Madalyn Underwood.  Friends of my existing pts Benita Gordon  Occupation: Homemaker  Hobbies: Gardening, bird watching  PCP: Tico Negro MD  "

## 2025-05-14 ENCOUNTER — APPOINTMENT (OUTPATIENT)
Dept: PHYSICAL THERAPY | Facility: CLINIC | Age: 75
End: 2025-05-14
Payer: MEDICARE

## 2025-05-14 DIAGNOSIS — Z96.652 HISTORY OF TOTAL KNEE ARTHROPLASTY, LEFT: Primary | ICD-10-CM

## 2025-05-14 DIAGNOSIS — R53.1 WEAKNESS: ICD-10-CM

## 2025-05-14 DIAGNOSIS — M25.60 LIMITED JOINT RANGE OF MOTION (ROM): ICD-10-CM

## 2025-05-14 DIAGNOSIS — M17.11 PRIMARY OSTEOARTHRITIS OF RIGHT KNEE: ICD-10-CM

## 2025-05-16 ENCOUNTER — APPOINTMENT (OUTPATIENT)
Dept: PHYSICAL THERAPY | Facility: CLINIC | Age: 75
End: 2025-05-16
Payer: MEDICARE

## 2025-05-16 DIAGNOSIS — Z96.652 HISTORY OF TOTAL KNEE ARTHROPLASTY, LEFT: Primary | ICD-10-CM

## 2025-05-16 DIAGNOSIS — R53.1 WEAKNESS: ICD-10-CM

## 2025-05-16 DIAGNOSIS — M17.11 PRIMARY OSTEOARTHRITIS OF RIGHT KNEE: ICD-10-CM

## 2025-05-16 DIAGNOSIS — M25.60 LIMITED JOINT RANGE OF MOTION (ROM): ICD-10-CM

## 2025-05-18 VITALS
OXYGEN SATURATION: 95 % | HEIGHT: 62 IN | WEIGHT: 176 LBS | HEART RATE: 86 BPM | BODY MASS INDEX: 32.39 KG/M2 | DIASTOLIC BLOOD PRESSURE: 80 MMHG | SYSTOLIC BLOOD PRESSURE: 134 MMHG

## 2025-05-18 PROBLEM — Z96.651 HISTORY OF TOTAL KNEE ARTHROPLASTY, RIGHT: Status: ACTIVE | Noted: 2025-05-18

## 2025-05-18 PROBLEM — I44.7 LEFT BUNDLE BRANCH BLOCK: Status: ACTIVE | Noted: 2025-05-18

## 2025-05-18 ASSESSMENT — ENCOUNTER SYMPTOMS
DYSPHORIC MOOD: 0
NERVOUS/ANXIOUS: 0
RESPIRATORY NEGATIVE: 1
CARDIOVASCULAR NEGATIVE: 1
ARTHRALGIAS: 1
PSYCHIATRIC NEGATIVE: 1

## 2025-05-19 ENCOUNTER — APPOINTMENT (OUTPATIENT)
Dept: PHYSICAL THERAPY | Facility: CLINIC | Age: 75
End: 2025-05-19
Payer: MEDICARE

## 2025-05-19 DIAGNOSIS — R53.1 WEAKNESS: ICD-10-CM

## 2025-05-19 DIAGNOSIS — M25.60 LIMITED JOINT RANGE OF MOTION (ROM): ICD-10-CM

## 2025-05-19 DIAGNOSIS — M17.11 PRIMARY OSTEOARTHRITIS OF RIGHT KNEE: ICD-10-CM

## 2025-05-19 DIAGNOSIS — Z96.652 HISTORY OF TOTAL KNEE ARTHROPLASTY, LEFT: Primary | ICD-10-CM

## 2025-05-21 ENCOUNTER — APPOINTMENT (OUTPATIENT)
Dept: PHYSICAL THERAPY | Facility: CLINIC | Age: 75
End: 2025-05-21
Payer: MEDICARE

## 2025-05-21 DIAGNOSIS — M25.60 LIMITED JOINT RANGE OF MOTION (ROM): ICD-10-CM

## 2025-05-21 DIAGNOSIS — R53.1 WEAKNESS: ICD-10-CM

## 2025-05-21 DIAGNOSIS — M17.11 PRIMARY OSTEOARTHRITIS OF RIGHT KNEE: ICD-10-CM

## 2025-05-21 DIAGNOSIS — Z96.652 HISTORY OF TOTAL KNEE ARTHROPLASTY, LEFT: Primary | ICD-10-CM

## 2025-07-08 ENCOUNTER — APPOINTMENT (OUTPATIENT)
Dept: CARDIOLOGY | Facility: CLINIC | Age: 75
End: 2025-07-08
Payer: MEDICARE

## 2025-07-09 ENCOUNTER — APPOINTMENT (OUTPATIENT)
Dept: CARDIOLOGY | Facility: CLINIC | Age: 75
End: 2025-07-09
Payer: MEDICARE

## 2025-07-11 ENCOUNTER — OFFICE VISIT (OUTPATIENT)
Dept: ORTHOPEDIC SURGERY | Facility: CLINIC | Age: 75
End: 2025-07-11
Payer: MEDICARE

## 2025-07-11 DIAGNOSIS — Z96.652 HISTORY OF TOTAL KNEE ARTHROPLASTY, LEFT: Primary | ICD-10-CM

## 2025-07-11 PROCEDURE — 99214 OFFICE O/P EST MOD 30 MIN: CPT | Performed by: STUDENT IN AN ORGANIZED HEALTH CARE EDUCATION/TRAINING PROGRAM

## 2025-07-11 PROCEDURE — 99212 OFFICE O/P EST SF 10 MIN: CPT | Performed by: STUDENT IN AN ORGANIZED HEALTH CARE EDUCATION/TRAINING PROGRAM

## 2025-07-11 PROCEDURE — 1159F MED LIST DOCD IN RCRD: CPT | Performed by: STUDENT IN AN ORGANIZED HEALTH CARE EDUCATION/TRAINING PROGRAM

## 2025-07-11 ASSESSMENT — PAIN - FUNCTIONAL ASSESSMENT: PAIN_FUNCTIONAL_ASSESSMENT: 0-10

## 2025-07-11 ASSESSMENT — PAIN SCALES - GENERAL: PAINLEVEL_OUTOF10: 0 - NO PAIN

## 2025-07-11 NOTE — PROGRESS NOTES
"DEVEN/TKA Related Summary           L hip: N  L knee  3/13/25: Primary TKA (myself at )  R hip: N  R knee  1/7/2017: Primary TKA (Dr. Dr. Rosales at [facility])  Lumbar surgery or significant pathology: N            CC/SUBJECTIVE/HPI            Mitra Jeffries is a 74 y.o. female following up regarding:     L knee: S/p L Primary TKA  Reports outcome as good  Pain mgmt: none  Assistive device: none  Able to navigate stairs: Y  Sleeping normally: Y  PT: completed            HISTORIES            Reports no major changes in health, substance use, or baseline medications.            OBJECTIVE            Physical exam  Estimated body mass index is 32.19 kg/m² as calculated from the following:    Height as of 5/6/25: 1.575 m (5' 2\").    Weight as of 5/6/25: 79.8 kg (176 lb).  Gen/psych: NAD, conversational, appropriate    Ambulation  Gait: normal  Assistive device: none  Spine: Unchanged from previous visit    Focused MSK exam: L  TKA  Skin/Incision: well healed  Effusion: trace  Alignment: neutral  Tenderness: none  Extension: passive flexion contracture 0° and active extension lag 0°  Flexion: 120º  Flexion stability: stable  Varus/Valgus stability: stable  Referred pain to knee with hip 90° flexion and 45° ER/IR: neg  Neurovascular             Strength: 5/5 hip/knee/ankle flexion and extension  Sensory (L2-S1): SILT throughout lower extremity  Edema/stasis: Diffuse swelling with no pitting  Pulse: DP 1+, PT 1+    Imaging  No new imaging             ASSESSMENT & PLAN           Patient verbalized understanding of below A&P. All questions answered.  L Primary TKA  Doing well  Follow-up: 1yr postop with XRs  R Primary TKA (Dr. Rosales, 2017)  Doing well  Follow-up: Interval XR in 5yrs (2029)  PMH, PSH, other considerations discussed  BMI<40 but on spectrum of increased risk of surgical complications.  Hx DVT/PE (\"very small\" clot in calf following previous surgery)  PCN allergy (hives)  Christian  Note: " Daughter of my existing pt, Madalyn Underwood.  Friends of my existing pts SwapnaBenita Pulliam.  Occupation: Homemaker  Hobbies: Gardening, bird watching  PCP: Tico Negro MD

## 2025-07-15 ENCOUNTER — ANCILLARY PROCEDURE (OUTPATIENT)
Dept: CARDIOLOGY | Facility: CLINIC | Age: 75
End: 2025-07-15
Payer: MEDICARE

## 2025-07-15 DIAGNOSIS — Z01.818 PREOP TESTING: ICD-10-CM

## 2025-07-15 DIAGNOSIS — I51.7 MILD LEFT VENTRICULAR HYPERTROPHY: ICD-10-CM

## 2025-07-15 DIAGNOSIS — I10 PRIMARY HYPERTENSION: ICD-10-CM

## 2025-07-15 DIAGNOSIS — I44.7 LBBB (LEFT BUNDLE BRANCH BLOCK): ICD-10-CM

## 2025-07-15 PROCEDURE — 93306 TTE W/DOPPLER COMPLETE: CPT

## 2025-07-16 LAB
AORTIC VALVE MEAN GRADIENT: 7 MMHG
AORTIC VALVE PEAK VELOCITY: 1.75 M/S
AV PEAK GRADIENT: 12 MMHG
AVA (PEAK VEL): 2.03 CM2
AVA (VTI): 1.95 CM2
EJECTION FRACTION APICAL 4 CHAMBER: 52.9
EJECTION FRACTION: 56 %
LEFT ATRIUM VOLUME AREA LENGTH INDEX BSA: 27.2 ML/M2
LEFT VENTRICLE INTERNAL DIMENSION DIASTOLE: 4.42 CM (ref 3.5–6)
LEFT VENTRICULAR OUTFLOW TRACT DIAMETER: 1.98 CM
MITRAL VALVE E/A RATIO: 0.57
RIGHT VENTRICLE FREE WALL PEAK S': 12 CM/S
RIGHT VENTRICLE PEAK SYSTOLIC PRESSURE: 26 MMHG
TRICUSPID ANNULAR PLANE SYSTOLIC EXCURSION: 2 CM

## 2025-07-17 ENCOUNTER — APPOINTMENT (OUTPATIENT)
Dept: CARDIOLOGY | Facility: CLINIC | Age: 75
End: 2025-07-17
Payer: MEDICARE

## 2025-07-17 DIAGNOSIS — I10 PRIMARY HYPERTENSION: ICD-10-CM

## 2025-07-17 DIAGNOSIS — I44.7 LBBB (LEFT BUNDLE BRANCH BLOCK): Primary | ICD-10-CM

## 2025-07-17 PROCEDURE — 1160F RVW MEDS BY RX/DR IN RCRD: CPT

## 2025-07-17 PROCEDURE — 1159F MED LIST DOCD IN RCRD: CPT

## 2025-07-17 PROCEDURE — G2211 COMPLEX E/M VISIT ADD ON: HCPCS

## 2025-07-17 PROCEDURE — 99214 OFFICE O/P EST MOD 30 MIN: CPT

## 2025-07-17 NOTE — PROGRESS NOTES
Location of visit: [address]   Type of Visit: New    Chief Complaint:  Patient was referred to Cardiology by Dr. Araceli girard. provider found for Follow-up (Echo follow up).    History Of Present Illness:    Mitra Jeffries is a 75 y.o. female, with history significant for hypertension, obesity (BMI 32.2), and prior DVT/PE, who visits Cardiology today as a new patient  for Preop evaluation.    74-year-old female with a history of hypertension, obesity (BMI 32.2), and prior DVT/PE presents for cardiology evaluation in preparation for elective left total knee arthroplasty. She denies cardiovascular symptoms, including chest pain, dyspnea on exertion, palpitations, or syncope. BP in preoperative assessment was elevated at 169/88, managed with amlodipine and hydrochlorothiazide, with instructions to monitor at home. No history of ischemic heart disease, heart failure, or arrhythmias. EKG shows normal sinus rhythm with a left bundle branch block, unchanged from prior studies. Functional capacity is estimated at 5 METs. Revised Cardiac Risk Index (RCRI) is 0, indicating a low risk for major adverse cardiac events (MACE). JORDAN score estimates a 0.19% risk for perioperative cardiac events. No significant renal impairment (Cr 0.52, GFR within normal limits). Given her history of DVT/PE, she is at high risk for postoperative venous thromboembolism (Caprini Score 13). Overall, she presents with a low perioperative cardiac risk, and no additional cardiovascular testing is warranted before surgery. Blood pressure control optimization is recommended, and close postoperative monitoring for VTE prevention is advised.  Echocardiogram April 2014: normal left ventricular systolic function with mild concentric left ventricular hypertrophy     Patient is sent for preop clearance in the context of a left bundle branch block.  She states she has normal functional capacity, she is very active, she uses medication for blood pressure,  and states she is very compliant.  She eats with salt.  She denies chest pain, chest pressure,  shortness of breath, orthopnea, nocturia, edema, palpitations, dizziness, lightheadedness, syncope.  She does not snore.  She has an echocardiogram from April 2014 that showed mild concentric left ventricular hypertrophy.  She has an EKG from November last year with left bundle branch block.  Blood pressure today: 148/66 mmHg  ===========================================  7/15/25  SONU: Left ventricular ejection fraction is normal calculated by Ivey's biplane at 56%. Abnormal septal motion consistent with left bundle branch block. There is left ventricular concentric remodeling.  There is normal right ventricular global systolic function.  The Doppler estimated RVSP is within normal limits at 26 mmHg. Aortic valve sclerosis. The peak and mean gradients are 12 mmHg and 7 mmHg respectively. Lipomatous hypertrophy of the atrial septum.  ============================================    Virtual or Telephone Consent    An interactive audio and video telecommunication system which permits real time communications between the patient (at the originating site) and provider (at the distant site) was utilized to provide this telehealth service.   Verbal consent was requested and obtained from Mitra Jeffries on this date, 07/17/25 for a telehealth visit and the patient's location was confirmed at the time of the visit.    Follow up with the results of the echocardiogram  She has been feeling much better. No chest pain, no chest pressure, no palpitation, no shortness of breath.  She has doing much better after the surgery. Significant weight loss in the last few month with exercise and healthy eating  Better control of her blood pressure  Completed echocardiogram with normal EF    Past Medical History:  She has a past medical history of Dermatitis (04/26/2023), Diverticulosis of intestine, part unspecified, without perforation or  abscess without bleeding (03/10/2014), Dizziness, Hypertension, Personal history of (healed) traumatic fracture (03/10/2014), Personal history of other diseases of the nervous system and sense organs (03/10/2014), Personal history of other venous thrombosis and embolism (2019), Personal history of pulmonary embolism (2020), Personal history of urinary (tract) infections (03/10/2014), Serous otitis media (2023), Sinusitis (2023), and Vertigo.    Past Surgical History:  She has a past surgical history that includes  section, classic (2013); Cholecystectomy (2013); Appendectomy (03/10/2014); Tonsillectomy (03/10/2014); Total knee arthroplasty (2018); Other surgical history (2016); and Other surgical history (2016).    Social History:  She reports that she has quit smoking. Her smoking use included cigarettes. She has never been exposed to tobacco smoke. She has never used smokeless tobacco. She reports that she does not use drugs. No history on file for alcohol use.    Family History:  No family history on file.  Allergies:  Codeine, Penicillins, Shellfish containing products, and Sulfa (sulfonamide antibiotics)    Outpatient Medications:  Current Outpatient Medications   Medication Instructions    amLODIPine (NORVASC) 10 mg, oral, Daily    clindamycin (CLEOCIN) 300 mg, 3 times daily    diazePAM (VALIUM) 2 mg, oral, Every 8 hours PRN    Guerda-Hex 4 % external liquid Daily PRN    Eliquis 2.5 mg, oral, 2 times daily    hydroCHLOROthiazide (HYDRODIURIL) 25 mg, oral, Daily    pantoprazole (PROTONIX) 40 mg, oral, Daily before breakfast, Do not crush, chew, or split. Take the entire time you are taking your blood thinning and pain medications. After that, you may stop.    pregabalin (LYRICA) 75 mg, oral, 2 times daily     Last Recorded Vitals:  There were no vitals filed for this visit.    Physical Exam:      2025    10:50 AM 2025    10:24 AM 2025      "1:31 PM 3/27/2025     9:31 AM 3/25/2025     9:39 AM 3/21/2025     9:14 AM   Vitals   Systolic 134 144    138   Diastolic 80 77    86   BP Location  Right arm    Left arm   Heart Rate  86    90   Temp   37.1 °C (98.8 °F)   36.5 °C (97.7 °F)   Resp   16 16 16 12   Height  1.575 m (5' 2\")       Weight (lb)  176       BMI  32.19 kg/m2       BSA (m2)  1.87 m2       Visit Report Report Report         Wt Readings from Last 5 Encounters:   05/06/25 79.8 kg (176 lb)   03/14/25 79.8 kg (176 lb)   03/12/25 85.9 kg (189 lb 6 oz)   03/05/25 81.2 kg (179 lb)   02/19/25 81.2 kg (179 lb)       Physical Exam   -Not performed due to virtual visit    Last Labs Reviewed:  CBC -  Recent Labs     03/13/25 0253 02/19/25  1453 12/20/24  1216 11/01/22  1106 08/16/21  1300   WBC 13.8* 6.8  --  6.8 6.1   HGB 13.8 14.8 15.1 16.1* 16.5*   HCT 40.9 43.9 45.0 48.5* 46.7*    335  --  336 312   MCV 86 86  --  93 91     CMP -  Recent Labs     03/13/25 0253 02/19/25  1453 03/15/24  1126 11/01/22  1106 08/16/21  1551    144 140 141 139   K 3.3* 4.4 3.8 4.2 3.5    102 102 102 98   CO2 25 29 28 29 26   ANIONGAP 15 17 14 14 19   BUN 18 17 18 16 14   CREATININE 0.54 0.52 0.54 0.61 0.56   EGFR >90 >90 >90  --   --    CALCIUM 9.7 10.1 9.8 9.9 9.9     Recent Labs     02/19/25  1453 12/20/24  1216 03/15/24  1126 11/01/22  1106 08/16/21  1551   ALBUMIN 4.6 4.3 4.4 4.3 4.6   ALKPHOS 58  --   --   --   --    ALT 13  --  17 19 19   AST 16  --  14 17 16   BILITOT 0.5  --   --   --   --      LIPID PANEL -   Recent Labs     03/15/24  1126 11/01/22  1106 08/16/21  1551   CHOL 278* 303* 335*   LDLF  --  203* 236*   HDL 48.8 67.0 60.8   TRIG  --  163* 189*     COAGULATION PANEL -  No results for input(s): \"PTT\", \"INR\", \"HAUF\", \"DDIMERVTE\", \"HAPTOGLOBIN\", \"FIBRINOGEN\" in the last 65663 hours.  HEME/ENDO -  Recent Labs     12/20/24  1216 03/15/24  1126 11/01/22  1106 08/16/21  1551   TSH  --  1.92 1.47 1.53   HGBA1C 5.2 5.5  --   --  "     CARDIOVASCULAR  Recent Labs     08/16/21  1551   CRP 0.40     Last Cardiology/Imaging Tests Personally Reviewed (if images available) and Interpreted:  ECG:  Encounter Date: 02/19/25   ECG 12 Lead   Result Value    Ventricular Rate 83    Atrial Rate 83    OR Interval 166    QRS Duration 148    QT Interval 442    QTC Calculation(Bazett) 519    P Axis 37    R Axis 72    T Axis 18    QRS Count 14    Q Onset 213    P Onset 130    P Offset 191    T Offset 434    QTC Fredericia 492    Narrative    Normal sinus rhythm  Left bundle branch block  Abnormal ECG  When compared with ECG of 19-NOV-2024 07:50, (unconfirmed)  T wave inversion now evident in Inferior leads  Confirmed by Fausto Fong ([id number]) on 2/20/2025 11:29:17 AM   ECG 12 Lead 02/19/2025    Echocardiogram:  No results found for this or any previous visit from the past 1825 days.  No results found for this or any previous visit from the past 1095 days.    Cath:  No results found for this or any previous visit from the past 1825 days.  No results found for this or any previous visit from the past 3650 days.  No results found for this or any previous visit from the past 1095 days.    Stress Test:  No results found for this or any previous visit from the past 1825 days.  No results found for this or any previous visit from the past 1095 days.    Cardiac CT/MRI:  No results found for this or any previous visit from the past 1825 days.  No results found for this or any previous visit from the past 1095 days.    Other CT:      CV RISK FACTORS:   # Hypertension: Last BP:  .  # Hyperlipidemia: Last Tchol No results found for requested labs within last 365 days. / LDL No results found for requested labs within last 365 days. / HDL No results found for requested labs within last 365 days. / TRIG No results found for requested labs within last 365 days. (No results in last year.).  # Type II Diabetes Mellitus: Last A1c 5.2 (12/20/2024: 12:16 PM).  # Obesity: Last BMI:   .  # CKD: Last BUN/Cr (GFR): 18/0.54 (>90), 3/13/2025:  2:53 AM.    ASCV RISK:  The 10-year ASCVD risk score (Indu APARICIO, et al., 2019) is: 23.4%    Values used to calculate the score:      Age: 75 years      Clincally relevant sex: Female      Is Non- : No      Diabetic: No      Tobacco smoker: No      Systolic Blood Pressure: 134 mmHg      Is BP treated: Yes      HDL Cholesterol: 48.8 mg/dL      Total Cholesterol: 278 mg/dL    Assessment/Plan   Mitra Jeffries is a 74 y.o. female, with history significant for hypertension, obesity (BMI 32.2), and prior DVT/PE, who visits Cardiology today as a new patient  for Preop evaluation.    7/15/25  SONU: Left ventricular ejection fraction is normal calculated by Ivey's biplane at 56%. Abnormal septal motion consistent with left bundle branch block. There is left ventricular concentric remodeling.  There is normal right ventricular global systolic function.  The Doppler estimated RVSP is within normal limits at 26 mmHg. Aortic valve sclerosis. The peak and mean gradients are 12 mmHg and 7 mmHg respectively. Lipomatous hypertrophy of the atrial septum.    #LBBB  #Hypertension  #History of DVT    Assessment and plan  Is symptomatic in the cardiovascular standpoint.  Echocardiogram show normal ejection fraction of the LV.  The patient has been increasing her activity after her knee surgery.  She has been losing weight, eating healthy, her blood pressure is under control.  In relation to this left bundle branch block needs follow-up with EKG once a year.  No further study at this point and no specific intervention.    Recommendations:  - Low sodium diet  - Check BP at home  - Patient will follow up with me in the Cardiology once a year  - I spent 30 minutes assessing the case between pre-charting, face-to-face patient interaction, and documentation    Michael Le MD

## 2025-07-17 NOTE — PATIENT INSTRUCTIONS
- Low sodium diet  - Check BP at home  -Dietary Recommendations: Increase intake of vegetables, fruits, legumes, nuts, whole grains, and fish to reduce cardiovascular risks. Replace saturated fats with healthier fats like monounsaturated and polyunsaturated fats. Reduce cholesterol and sodium intake. Minimize consumption of processed meats, refined carbohydrates, and sweetened beverages.  - Physical Activity: Aim for at least 150 minutes of moderate-intensity or 75 minutes of vigorous-intensity aerobic exercise each week. These can be done in combination for the best results.  - Weight Management: Weight loss is recommended to improve overall cardiovascular health.  - Patient will follow up with me in the Cardiology once a year

## 2025-08-19 ENCOUNTER — APPOINTMENT (OUTPATIENT)
Dept: PRIMARY CARE | Facility: CLINIC | Age: 75
End: 2025-08-19
Payer: MEDICARE

## 2025-08-19 VITALS
BODY MASS INDEX: 28.53 KG/M2 | DIASTOLIC BLOOD PRESSURE: 76 MMHG | WEIGHT: 161 LBS | HEIGHT: 63 IN | HEART RATE: 83 BPM | OXYGEN SATURATION: 93 % | SYSTOLIC BLOOD PRESSURE: 134 MMHG

## 2025-08-19 DIAGNOSIS — Z96.651 HISTORY OF TOTAL KNEE ARTHROPLASTY, RIGHT: ICD-10-CM

## 2025-08-19 DIAGNOSIS — Z00.00 ROUTINE GENERAL MEDICAL EXAMINATION AT A HEALTH CARE FACILITY: Primary | ICD-10-CM

## 2025-08-19 DIAGNOSIS — F41.9 ANXIETY: ICD-10-CM

## 2025-08-19 DIAGNOSIS — Z51.81 THERAPEUTIC DRUG MONITORING: ICD-10-CM

## 2025-08-19 DIAGNOSIS — I44.7 LEFT BUNDLE BRANCH BLOCK: ICD-10-CM

## 2025-08-19 DIAGNOSIS — Z96.652 HISTORY OF TOTAL KNEE ARTHROPLASTY, LEFT: ICD-10-CM

## 2025-08-19 DIAGNOSIS — Z28.21 IMMUNIZATION DECLINED: ICD-10-CM

## 2025-08-19 DIAGNOSIS — I10 PRIMARY HYPERTENSION: ICD-10-CM

## 2025-08-19 RX ORDER — AMLODIPINE BESYLATE 10 MG/1
10 TABLET ORAL DAILY
Qty: 90 TABLET | Refills: 1 | Status: SHIPPED | OUTPATIENT
Start: 2025-08-19

## 2025-08-19 ASSESSMENT — ENCOUNTER SYMPTOMS
OCCASIONAL FEELINGS OF UNSTEADINESS: 0
DEPRESSION: 0
LOSS OF SENSATION IN FEET: 0

## 2025-08-19 ASSESSMENT — ACTIVITIES OF DAILY LIVING (ADL)
MANAGING_FINANCES: INDEPENDENT
BATHING: INDEPENDENT
DOING_HOUSEWORK: INDEPENDENT
GROCERY_SHOPPING: NEEDS ASSISTANCE
TAKING_MEDICATION: INDEPENDENT
DRESSING: INDEPENDENT

## 2025-08-19 ASSESSMENT — PATIENT HEALTH QUESTIONNAIRE - PHQ9
SUM OF ALL RESPONSES TO PHQ9 QUESTIONS 1 AND 2: 0
1. LITTLE INTEREST OR PLEASURE IN DOING THINGS: NOT AT ALL
2. FEELING DOWN, DEPRESSED OR HOPELESS: NOT AT ALL

## 2026-07-22 ENCOUNTER — APPOINTMENT (OUTPATIENT)
Dept: CARDIOLOGY | Facility: CLINIC | Age: 76
End: 2026-07-22
Payer: MEDICARE

## (undated) DEVICE — GLOVE, SURGICAL, PROTEXIS PI , 7.5, PF, LF

## (undated) DEVICE — BANDAGE, COFLEX, 6 X 5 YDS, TAN, STERILE, LF

## (undated) DEVICE — SUTURE, VICRYL, 1, 36 IN, CT-1, UNDYED

## (undated) DEVICE — CHECKPOINT KIT, FEMORAL/ TIBIAL

## (undated) DEVICE — MASK, AURASTRAIGHT, LARYN, SIZE 5

## (undated) DEVICE — BITE BLOCK, SOFT, MOUTH, 3/4 X 4, LARGE, WHITE

## (undated) DEVICE — SOLUTION, INJECTION, USP, LACTATED RINGERS, LIFECARE, 1000ML

## (undated) DEVICE — Device

## (undated) DEVICE — RESTRAINT, WRIST, XLONG, DISPOSABLE

## (undated) DEVICE — DRESSING, FOAM, MEPILEX LITE, 4 X 4

## (undated) DEVICE — GOWN, SURGICAL, ECLIPSE, FABRIC, 2XL, REINFORCED

## (undated) DEVICE — HOOD, SURGICAL, FLYTE HYBRID

## (undated) DEVICE — DRAPE, INSTRUMENT, W/POUCH, STERI DRAPE, 7 X 11 IN, DISPOSABLE, STERILE

## (undated) DEVICE — DRAPE, SHEET, THREE QUARTER, FAN FOLD, 57 X 77 IN

## (undated) DEVICE — CEMENT, MIXEVAC III, 10S BOWL, KNEES

## (undated) DEVICE — IRRIGATION SYSTEM, WOUND, PULSAVAC PLUS

## (undated) DEVICE — PROTECTIVE, FOOT, FOAM PAD & COHESIVE WRAP, STERILE

## (undated) DEVICE — PREP, SKIN, BETADINE, SCRUB, 1 GA

## (undated) DEVICE — CUFF, TOURNIQUET, 30 X 4, DUAL PORT/SNGL BLADDER, DISP, LF

## (undated) DEVICE — SUTURE, ETHIBOND, P2, V-37, 30 IN, GREEN

## (undated) DEVICE — SUTURE, ETHILON, 3-0, 18 IN, PS1, BLACK

## (undated) DEVICE — CATHETER, SUCTION, CATH-N-GLOVE, PEEL POUCH, 18 FR

## (undated) DEVICE — CLOSURE SYSTEM, DERMABOND, PRINEO, 22CM, STERILE

## (undated) DEVICE — SOLUTION, IRRIGATION, X RX SODIUM CHL 0.9%, 1000ML BTL

## (undated) DEVICE — SUTURE, VICRYL, 2-0, 18 IN CP-2, UNDYED

## (undated) DEVICE — BLADE, MAKO, SAGITTAL, NARROW

## (undated) DEVICE — GLOVE, SURGICAL, PROTEXIS PI BLUE W/NEUTHERA, 8.0, PF, LF

## (undated) DEVICE — SOLUTION, POVIDONE IODINE 10%, 0.75 OZ, NS

## (undated) DEVICE — TRACKING KIT, TM KNEE PROCEDURES, VIZADISC

## (undated) DEVICE — BONE PIN, 3.2MM X 110MM, STERILE